# Patient Record
Sex: MALE | Race: WHITE | NOT HISPANIC OR LATINO | Employment: OTHER | URBAN - METROPOLITAN AREA
[De-identification: names, ages, dates, MRNs, and addresses within clinical notes are randomized per-mention and may not be internally consistent; named-entity substitution may affect disease eponyms.]

---

## 2017-01-20 ENCOUNTER — RESULTS ENCOUNTER (OUTPATIENT)
Dept: FAMILY MEDICINE CLINIC | Facility: CLINIC | Age: 77
End: 2017-01-20

## 2017-01-20 DIAGNOSIS — E78.5 HYPERLIPIDEMIA: ICD-10-CM

## 2017-01-20 DIAGNOSIS — E03.9 ACQUIRED HYPOTHYROIDISM: ICD-10-CM

## 2017-01-20 DIAGNOSIS — I10 ESSENTIAL HYPERTENSION: ICD-10-CM

## 2017-01-20 DIAGNOSIS — Z86.73 HISTORY OF CVA (CEREBROVASCULAR ACCIDENT): ICD-10-CM

## 2017-02-15 ENCOUNTER — OFFICE VISIT (OUTPATIENT)
Dept: FAMILY MEDICINE CLINIC | Facility: CLINIC | Age: 77
End: 2017-02-15

## 2017-02-15 VITALS
DIASTOLIC BLOOD PRESSURE: 64 MMHG | HEIGHT: 69 IN | HEART RATE: 60 BPM | OXYGEN SATURATION: 98 % | BODY MASS INDEX: 27.25 KG/M2 | WEIGHT: 184 LBS | SYSTOLIC BLOOD PRESSURE: 116 MMHG | TEMPERATURE: 97.8 F | RESPIRATION RATE: 16 BRPM

## 2017-02-15 DIAGNOSIS — Z86.73 HISTORY OF CVA (CEREBROVASCULAR ACCIDENT): ICD-10-CM

## 2017-02-15 DIAGNOSIS — I10 ESSENTIAL HYPERTENSION: ICD-10-CM

## 2017-02-15 DIAGNOSIS — E78.2 MIXED HYPERLIPIDEMIA: ICD-10-CM

## 2017-02-15 DIAGNOSIS — E03.9 ACQUIRED HYPOTHYROIDISM: ICD-10-CM

## 2017-02-15 PROCEDURE — 99213 OFFICE O/P EST LOW 20 MIN: CPT | Performed by: NURSE PRACTITIONER

## 2017-02-15 RX ORDER — CLOPIDOGREL BISULFATE 75 MG/1
75 TABLET ORAL DAILY
Qty: 90 TABLET | Refills: 1 | Status: SHIPPED | OUTPATIENT
Start: 2017-02-15 | End: 2017-08-30 | Stop reason: SDUPTHER

## 2017-02-15 RX ORDER — HYDROCODONE BITARTRATE AND ACETAMINOPHEN 5; 325 MG/1; MG/1
TABLET ORAL
Refills: 0 | COMMUNITY
Start: 2017-02-06 | End: 2017-04-20

## 2017-02-15 RX ORDER — IRBESARTAN 75 MG/1
75 TABLET ORAL DAILY
Qty: 90 TABLET | Refills: 1 | Status: SHIPPED | OUTPATIENT
Start: 2017-02-15 | End: 2017-03-28 | Stop reason: HOSPADM

## 2017-02-15 RX ORDER — IRBESARTAN 150 MG/1
150 TABLET ORAL DAILY
Qty: 90 TABLET | Refills: 1 | Status: CANCELLED | OUTPATIENT
Start: 2017-02-15 | End: 2017-08-14

## 2017-02-15 RX ORDER — EZETIMIBE 10 MG/1
10 TABLET ORAL NIGHTLY
Qty: 90 TABLET | Refills: 1 | Status: SHIPPED | OUTPATIENT
Start: 2017-02-15 | End: 2017-03-28 | Stop reason: HOSPADM

## 2017-02-15 RX ORDER — LEVOTHYROXINE SODIUM 175 UG/1
175 TABLET ORAL DAILY
Qty: 90 TABLET | Refills: 1 | Status: SHIPPED | OUTPATIENT
Start: 2017-02-15 | End: 2017-08-22 | Stop reason: SDUPTHER

## 2017-02-15 NOTE — PROGRESS NOTES
Subjective   Ismael Ball is a 76 y.o. male.     History of Present Illness   Ismael Ball 76 y.o. male who presents today for routine follow up check and medication refills.  he has a history of   Patient Active Problem List   Diagnosis   • Acquired hypothyroidism   • Essential hypertension   • Hyperlipidemia   • History of CVA (cerebrovascular accident)   • History of prostate cancer   .  Since the last visit, he has overall felt well.  he has been compliant with current meds.  he denies medication side effects.    The following portions of the patient's history were reviewed and updated as appropriate: allergies, current medications, past family history, past medical history, past social history, past surgical history and problem list.    Review of Systems   Constitutional: Negative for fatigue.   Respiratory: Negative for cough and shortness of breath.    Cardiovascular: Negative for chest pain and palpitations.   Skin: Negative for rash.   Psychiatric/Behavioral: Negative for dysphoric mood and sleep disturbance. The patient is not nervous/anxious.        Objective   Physical Exam   Constitutional: He is oriented to person, place, and time. He appears well-developed and well-nourished.   Neck: Carotid bruit is not present.   Cardiovascular: Normal rate and regular rhythm.    Pulmonary/Chest: Effort normal and breath sounds normal.   Neurological: He is oriented to person, place, and time.   Skin: Skin is warm and dry.   Psychiatric: He has a normal mood and affect. His behavior is normal. Judgment and thought content normal.   Nursing note and vitals reviewed.      Assessment/Plan   Ismael was seen today for hyperlipidemia, hypertension and hypothyroidism.    Diagnoses and all orders for this visit:    Acquired hypothyroidism  -     levothyroxine (SYNTHROID, LEVOTHROID) 175 MCG tablet; Take 1 tablet by mouth Daily for 180 days.    Essential hypertension  -     irbesartan (AVAPRO) 75 MG tablet; Take 1 tablet  by mouth Daily.    Mixed hyperlipidemia  -     ezetimibe (ZETIA) 10 MG tablet; Take 1 tablet by mouth Every Night for 180 days.    History of CVA (cerebrovascular accident)  -     clopidogrel (PLAVIX) 75 MG tablet; Take 1 tablet by mouth Daily for 180 days.    Other orders  -     Cancel: irbesartan (AVAPRO) 150 MG tablet; Take 1 tablet by mouth Daily for 180 days.

## 2017-03-02 ENCOUNTER — OFFICE VISIT (OUTPATIENT)
Dept: FAMILY MEDICINE CLINIC | Facility: CLINIC | Age: 77
End: 2017-03-02

## 2017-03-02 VITALS
DIASTOLIC BLOOD PRESSURE: 63 MMHG | TEMPERATURE: 98 F | SYSTOLIC BLOOD PRESSURE: 98 MMHG | WEIGHT: 180 LBS | RESPIRATION RATE: 16 BRPM | BODY MASS INDEX: 26.66 KG/M2 | HEART RATE: 69 BPM | HEIGHT: 69 IN

## 2017-03-02 DIAGNOSIS — R06.02 SHORTNESS OF BREATH: ICD-10-CM

## 2017-03-02 DIAGNOSIS — K83.1 BILIARY STRICTURE: ICD-10-CM

## 2017-03-02 DIAGNOSIS — J40 BRONCHITIS: Primary | ICD-10-CM

## 2017-03-02 LAB
ALBUMIN SERPL-MCNC: 4.1 G/DL (ref 3.5–5.2)
ALBUMIN/GLOB SERPL: 1.8 G/DL
ALP SERPL-CCNC: 136 U/L (ref 39–117)
ALT SERPL-CCNC: 37 U/L (ref 1–41)
AST SERPL-CCNC: 25 U/L (ref 1–40)
BASOPHILS # BLD AUTO: 0.06 10*3/MM3 (ref 0–0.2)
BASOPHILS NFR BLD AUTO: 0.8 % (ref 0–1.5)
BILIRUB SERPL-MCNC: 0.7 MG/DL (ref 0.1–1.2)
BUN SERPL-MCNC: 16 MG/DL (ref 8–23)
BUN/CREAT SERPL: 18.2 (ref 7–25)
CALCIUM SERPL-MCNC: 9.7 MG/DL (ref 8.6–10.5)
CHLORIDE SERPL-SCNC: 102 MMOL/L (ref 98–107)
CHOLEST SERPL-MCNC: 208 MG/DL (ref 0–200)
CO2 SERPL-SCNC: 27.3 MMOL/L (ref 22–29)
CREAT SERPL-MCNC: 0.88 MG/DL (ref 0.76–1.27)
EOSINOPHIL # BLD AUTO: 0.3 10*3/MM3 (ref 0–0.7)
EOSINOPHIL NFR BLD AUTO: 4.2 % (ref 0.3–6.2)
ERYTHROCYTE [DISTWIDTH] IN BLOOD BY AUTOMATED COUNT: 13.9 % (ref 11.5–14.5)
GLOBULIN SER CALC-MCNC: 2.3 GM/DL
GLUCOSE SERPL-MCNC: 105 MG/DL (ref 65–99)
HCT VFR BLD AUTO: 44.4 % (ref 40.4–52.2)
HDLC SERPL-MCNC: 51 MG/DL (ref 40–60)
HGB BLD-MCNC: 14.6 G/DL (ref 13.7–17.6)
IMM GRANULOCYTES # BLD: 0 10*3/MM3 (ref 0–0.03)
IMM GRANULOCYTES NFR BLD: 0 % (ref 0–0.5)
LDLC SERPL CALC-MCNC: 125 MG/DL (ref 0–100)
LDLC/HDLC SERPL: 2.45 {RATIO}
LYMPHOCYTES # BLD AUTO: 3.07 10*3/MM3 (ref 0.9–4.8)
LYMPHOCYTES NFR BLD AUTO: 42.9 % (ref 19.6–45.3)
MCH RBC QN AUTO: 30.2 PG (ref 27–32.7)
MCHC RBC AUTO-ENTMCNC: 32.9 G/DL (ref 32.6–36.4)
MCV RBC AUTO: 91.9 FL (ref 79.8–96.2)
MONOCYTES # BLD AUTO: 0.3 10*3/MM3 (ref 0.2–1.2)
MONOCYTES NFR BLD AUTO: 4.2 % (ref 5–12)
NEUTROPHILS # BLD AUTO: 3.42 10*3/MM3 (ref 1.9–8.1)
NEUTROPHILS NFR BLD AUTO: 47.9 % (ref 42.7–76)
PLATELET # BLD AUTO: 295 10*3/MM3 (ref 140–500)
POTASSIUM SERPL-SCNC: 4.3 MMOL/L (ref 3.5–5.2)
PROT SERPL-MCNC: 6.4 G/DL (ref 6–8.5)
RBC # BLD AUTO: 4.83 10*6/MM3 (ref 4.6–6)
SODIUM SERPL-SCNC: 144 MMOL/L (ref 136–145)
TRIGL SERPL-MCNC: 160 MG/DL (ref 0–150)
TSH SERPL DL<=0.005 MIU/L-ACNC: 0.91 MIU/ML (ref 0.27–4.2)
VLDLC SERPL CALC-MCNC: 32 MG/DL (ref 5–40)
WBC # BLD AUTO: 7.15 10*3/MM3 (ref 4.5–10.7)

## 2017-03-02 PROCEDURE — 99214 OFFICE O/P EST MOD 30 MIN: CPT | Performed by: FAMILY MEDICINE

## 2017-03-02 PROCEDURE — 71020 XR CHEST PA AND LATERAL: CPT | Performed by: FAMILY MEDICINE

## 2017-03-02 NOTE — PROGRESS NOTES
"Chief Complaint   Patient presents with   • URI       Subjective   This patient presents the office to follow-up on recent bronchitis.  He had an ERCP done on February 16 by Dr. Ibarra.  He has a diagnosis of biliary stricture.  Currently he has a stent in his bile duct.  He developed some shortness of breath and was seen in the emergency room on February 22.  After evaluation it was felt he had right lower lobe acute bronchitis.  He was treated with azithromycin which he took starting February 23.  He finished the antibiotics on February 27 and has gradually gotten better.  Today he is asymptomatic with no fever and no cough.  His shortness of breath is much better.  He is here today just to follow up and make sure that the residual changes seen on x-ray have resolved completely. Pt had labs done today    Review of Systems   Respiratory: Positive for shortness of breath. Negative for cough.    Cardiovascular: Negative for chest pain.       Objective   Visit Vitals   • BP 98/63   • Pulse 69   • Temp 98 °F (36.7 °C) (Oral)   • Resp 16   • Ht 69\" (175.3 cm)   • Wt 180 lb (81.6 kg)   • BMI 26.58 kg/m2     Body mass index is 26.58 kg/(m^2).  Physical Exam   Constitutional: No distress.   Pulmonary/Chest: Effort normal and breath sounds normal.   Vitals reviewed.  Views: PA/Lateral    Relevant Clinical Issues/Diagnoses/Indications for XRay: see HPI  Clinical Findings: Chest: elevated right hemidiaphragm    Compared with previous XRay? no    Date of Previous Xray:unknown date    Changes on current Xray? not applicable      Assessment/Plan     Problem List Items Addressed This Visit        Respiratory    RESOLVED: Shortness of breath       Digestive    Biliary stricture      Other Visit Diagnoses     Bronchitis    -  Primary    Relevant Orders    XR Chest PA & Lateral          Outpatient Encounter Prescriptions as of 3/2/2017   Medication Sig Dispense Refill   • clopidogrel (PLAVIX) 75 MG tablet Take 1 tablet by mouth " Daily for 180 days. 90 tablet 1   • ezetimibe (ZETIA) 10 MG tablet Take 1 tablet by mouth Every Night for 180 days. 90 tablet 1   • HYDROcodone-acetaminophen (NORCO) 5-325 MG per tablet TK 1 T PO Q 8 H PRN P  0   • irbesartan (AVAPRO) 75 MG tablet Take 1 tablet by mouth Daily. 90 tablet 1   • levothyroxine (SYNTHROID, LEVOTHROID) 175 MCG tablet Take 1 tablet by mouth Daily for 180 days. 90 tablet 1   • predniSONE (DELTASONE) 5 MG tablet      • ZYTIGA 250 MG chemo tablet        No facility-administered encounter medications on file as of 3/2/2017.        Orders Placed This Encounter   Procedures   • XR Chest PA & Lateral     Order Specific Question:   Reason for Exam:     Answer:   followup RLL bronchitis       Continue with current treatment plan.         RTC as needed or sooner if symptoms worsen or change

## 2017-03-07 NOTE — PROGRESS NOTES
Please call the patient regarding his abnormal result. Set up repeat CXR PA and lateral as suggested. It was rotated. Make sure Sunshine reads the report prior to repeating the film. Have him make an appointment with me after the repeat film has been done. Dr. Olson

## 2017-03-07 NOTE — PROGRESS NOTES
Please accept these satisfactory lab results. Please keep regular follow up appointment as scheduled. Elevated alkaline phos likely due to lung infection. Send low sugar, low cholesterol diet to patient. Keep same followup.                                                     Dr. Olson

## 2017-03-08 ENCOUNTER — TELEPHONE (OUTPATIENT)
Dept: FAMILY MEDICINE CLINIC | Facility: CLINIC | Age: 77
End: 2017-03-08

## 2017-03-08 DIAGNOSIS — R91.1 LUNG NODULE: Primary | ICD-10-CM

## 2017-03-08 DIAGNOSIS — R93.89 ABNORMAL X-RAY: ICD-10-CM

## 2017-03-08 PROCEDURE — 71020 XR CHEST PA AND LATERAL: CPT | Performed by: FAMILY MEDICINE

## 2017-03-08 NOTE — TELEPHONE ENCOUNTER
----- Message from Pierre Olson MD sent at 3/7/2017 10:31 AM EST -----  Please call the patient regarding his abnormal result. Set up repeat CXR PA and lateral as suggested. It was rotated. Make sure Sunshine reads the report prior to repeating the film. Have him make an appointment with me after the repeat film has been done. Dr. Olson

## 2017-03-13 NOTE — TELEPHONE ENCOUNTER
Please call the patient regarding his abnormal result.  Please tell him that this is probably nothing but they did recommend repeat CT scan of the chest.  Please set this up and have him make an appointment to see me after the CT scan of the chest is completed.  This should be CT scan chest with and without contrast.

## 2017-03-14 ENCOUNTER — TELEPHONE (OUTPATIENT)
Dept: FAMILY MEDICINE CLINIC | Facility: CLINIC | Age: 77
End: 2017-03-14

## 2017-03-14 NOTE — TELEPHONE ENCOUNTER
----- Message from Pierre Olson MD sent at 3/12/2017  9:23 PM EDT -----  Please call the patient regarding his abnormal result.  Please tell him that this is probably nothing but they did recommend repeat CT scan of the chest.  Please set this up and have him make an appointment to see me after the CT scan of the chest is completed.  This should be CT scan chest with and without contrast.

## 2017-03-15 NOTE — TELEPHONE ENCOUNTER
Please ask the radiologist to compare the recent ct chest with his cxr and then create addendum to his reading. thanks

## 2017-03-22 ENCOUNTER — APPOINTMENT (OUTPATIENT)
Dept: GENERAL RADIOLOGY | Facility: HOSPITAL | Age: 77
End: 2017-03-22

## 2017-03-22 ENCOUNTER — APPOINTMENT (OUTPATIENT)
Dept: CT IMAGING | Facility: HOSPITAL | Age: 77
End: 2017-03-22

## 2017-03-22 ENCOUNTER — HOSPITAL ENCOUNTER (INPATIENT)
Facility: HOSPITAL | Age: 77
LOS: 6 days | Discharge: HOME | End: 2017-03-28
Attending: FAMILY MEDICINE | Admitting: INTERNAL MEDICINE

## 2017-03-22 DIAGNOSIS — I50.9 ACUTE CONGESTIVE HEART FAILURE, UNSPECIFIED CONGESTIVE HEART FAILURE TYPE: ICD-10-CM

## 2017-03-22 DIAGNOSIS — K83.09 ASCENDING CHOLANGITIS: Primary | ICD-10-CM

## 2017-03-22 DIAGNOSIS — N17.9 ACUTE RENAL FAILURE, UNSPECIFIED ACUTE RENAL FAILURE TYPE (HCC): ICD-10-CM

## 2017-03-22 DIAGNOSIS — A41.9 SEVERE SEPSIS WITH SEPTIC SHOCK (HCC): ICD-10-CM

## 2017-03-22 DIAGNOSIS — R65.21 SEVERE SEPSIS WITH SEPTIC SHOCK (HCC): ICD-10-CM

## 2017-03-22 LAB
ALBUMIN SERPL-MCNC: 2.9 G/DL (ref 3.5–5.2)
ALBUMIN/GLOB SERPL: 1.3 G/DL
ALP SERPL-CCNC: 255 U/L (ref 39–117)
ALT SERPL W P-5'-P-CCNC: 332 U/L (ref 1–41)
ANION GAP SERPL CALCULATED.3IONS-SCNC: 23.6 MMOL/L
AST SERPL-CCNC: 282 U/L (ref 1–40)
BILIRUB SERPL-MCNC: 6.4 MG/DL (ref 0.1–1.2)
BUN BLD-MCNC: 30 MG/DL (ref 8–23)
BUN/CREAT SERPL: 9.3 (ref 7–25)
CALCIUM SPEC-SCNC: 8.5 MG/DL (ref 8.6–10.5)
CHLORIDE SERPL-SCNC: 101 MMOL/L (ref 98–107)
CO2 SERPL-SCNC: 18.4 MMOL/L (ref 22–29)
CREAT BLD-MCNC: 3.22 MG/DL (ref 0.76–1.27)
D-LACTATE SERPL-SCNC: 3 MMOL/L (ref 0.5–2)
D-LACTATE SERPL-SCNC: 6.4 MMOL/L (ref 0.5–2)
DEPRECATED RDW RBC AUTO: 50.3 FL (ref 37–54)
DOHLE BODIES: PRESENT
EOSINOPHIL # BLD MANUAL: 0.1 10*3/MM3 (ref 0–0.7)
EOSINOPHIL NFR BLD MANUAL: 1 % (ref 0.3–6.2)
ERYTHROCYTE [DISTWIDTH] IN BLOOD BY AUTOMATED COUNT: 14.8 % (ref 11.5–14.5)
FLUAV AG NPH QL: NEGATIVE
FLUBV AG NPH QL IA: NEGATIVE
GFR SERPL CREATININE-BSD FRML MDRD: 19 ML/MIN/1.73
GLOBULIN UR ELPH-MCNC: 2.2 GM/DL
GLUCOSE BLD-MCNC: 121 MG/DL (ref 65–99)
GLUCOSE BLDC GLUCOMTR-MCNC: 65 MG/DL (ref 70–130)
HCT VFR BLD AUTO: 38.3 % (ref 40.4–52.2)
HGB BLD-MCNC: 12.3 G/DL (ref 13.7–17.6)
HOLD SPECIMEN: NORMAL
HOLD SPECIMEN: NORMAL
LYMPHOCYTES # BLD MANUAL: 2.3 10*3/MM3 (ref 0.9–4.8)
LYMPHOCYTES NFR BLD MANUAL: 24 % (ref 19.6–45.3)
MCH RBC QN AUTO: 29.8 PG (ref 27–32.7)
MCHC RBC AUTO-ENTMCNC: 32.1 G/DL (ref 32.6–36.4)
MCV RBC AUTO: 92.7 FL (ref 79.8–96.2)
METAMYELOCYTES NFR BLD MANUAL: 2 % (ref 0–0)
NEUTROPHILS # BLD AUTO: 6.99 10*3/MM3 (ref 1.9–8.1)
NEUTROPHILS NFR BLD MANUAL: 73 % (ref 42.7–76)
NEUTS BAND NFR BLD MANUAL: 0 % (ref 0–5)
NEUTS VAC BLD QL SMEAR: ABNORMAL
NT-PROBNP SERPL-MCNC: 8969 PG/ML (ref 0–1800)
PLAT MORPH BLD: NORMAL
PLATELET # BLD AUTO: 149 10*3/MM3 (ref 140–500)
PMV BLD AUTO: 11.1 FL (ref 6–12)
POTASSIUM BLD-SCNC: 3.5 MMOL/L (ref 3.5–5.2)
PROT SERPL-MCNC: 5.1 G/DL (ref 6–8.5)
RBC # BLD AUTO: 4.13 10*6/MM3 (ref 4.6–6)
RBC MORPH BLD: NORMAL
SCAN SLIDE: NORMAL
SODIUM BLD-SCNC: 143 MMOL/L (ref 136–145)
TROPONIN T SERPL-MCNC: 0.03 NG/ML (ref 0–0.03)
WBC NRBC COR # BLD: 9.57 10*3/MM3 (ref 4.5–10.7)
WHOLE BLOOD HOLD SPECIMEN: NORMAL
WHOLE BLOOD HOLD SPECIMEN: NORMAL

## 2017-03-22 PROCEDURE — 80053 COMPREHEN METABOLIC PANEL: CPT | Performed by: FAMILY MEDICINE

## 2017-03-22 PROCEDURE — 99285 EMERGENCY DEPT VISIT HI MDM: CPT

## 2017-03-22 PROCEDURE — 25010000002 CEFEPIME: Performed by: FAMILY MEDICINE

## 2017-03-22 PROCEDURE — 87804 INFLUENZA ASSAY W/OPTIC: CPT | Performed by: PHYSICIAN ASSISTANT

## 2017-03-22 PROCEDURE — 93005 ELECTROCARDIOGRAM TRACING: CPT | Performed by: FAMILY MEDICINE

## 2017-03-22 PROCEDURE — 71020 HC CHEST PA AND LATERAL: CPT

## 2017-03-22 PROCEDURE — 87040 BLOOD CULTURE FOR BACTERIA: CPT | Performed by: FAMILY MEDICINE

## 2017-03-22 PROCEDURE — 85025 COMPLETE CBC W/AUTO DIFF WBC: CPT | Performed by: FAMILY MEDICINE

## 2017-03-22 PROCEDURE — 82962 GLUCOSE BLOOD TEST: CPT

## 2017-03-22 PROCEDURE — 25010000002 METHYLPREDNISOLONE PER 125 MG: Performed by: FAMILY MEDICINE

## 2017-03-22 PROCEDURE — 87186 SC STD MICRODIL/AGAR DIL: CPT | Performed by: FAMILY MEDICINE

## 2017-03-22 PROCEDURE — 84484 ASSAY OF TROPONIN QUANT: CPT | Performed by: FAMILY MEDICINE

## 2017-03-22 PROCEDURE — 25010000002 VANCOMYCIN: Performed by: FAMILY MEDICINE

## 2017-03-22 PROCEDURE — 74176 CT ABD & PELVIS W/O CONTRAST: CPT

## 2017-03-22 PROCEDURE — 85007 BL SMEAR W/DIFF WBC COUNT: CPT | Performed by: FAMILY MEDICINE

## 2017-03-22 PROCEDURE — 93010 ELECTROCARDIOGRAM REPORT: CPT | Performed by: INTERNAL MEDICINE

## 2017-03-22 PROCEDURE — 87150 DNA/RNA AMPLIFIED PROBE: CPT | Performed by: FAMILY MEDICINE

## 2017-03-22 PROCEDURE — 83880 ASSAY OF NATRIURETIC PEPTIDE: CPT | Performed by: FAMILY MEDICINE

## 2017-03-22 PROCEDURE — 83605 ASSAY OF LACTIC ACID: CPT | Performed by: FAMILY MEDICINE

## 2017-03-22 RX ORDER — HYDROCODONE BITARTRATE AND ACETAMINOPHEN 5; 325 MG/1; MG/1
2 TABLET ORAL EVERY 6 HOURS PRN
Status: DISCONTINUED | OUTPATIENT
Start: 2017-03-22 | End: 2017-03-28 | Stop reason: HOSPADM

## 2017-03-22 RX ORDER — ONDANSETRON 4 MG/1
4 TABLET, ORALLY DISINTEGRATING ORAL EVERY 6 HOURS PRN
Status: DISCONTINUED | OUTPATIENT
Start: 2017-03-22 | End: 2017-03-28 | Stop reason: HOSPADM

## 2017-03-22 RX ORDER — PREDNISONE 1 MG/1
5 TABLET ORAL 2 TIMES DAILY WITH MEALS
Status: DISCONTINUED | OUTPATIENT
Start: 2017-03-22 | End: 2017-03-23

## 2017-03-22 RX ORDER — PANTOPRAZOLE SODIUM 40 MG/10ML
40 INJECTION, POWDER, LYOPHILIZED, FOR SOLUTION INTRAVENOUS
Status: DISCONTINUED | OUTPATIENT
Start: 2017-03-23 | End: 2017-03-25

## 2017-03-22 RX ORDER — HYDROCODONE BITARTRATE AND ACETAMINOPHEN 10; 325 MG/1; MG/1
1 TABLET ORAL EVERY 6 HOURS PRN
COMMUNITY
End: 2017-03-28 | Stop reason: HOSPADM

## 2017-03-22 RX ORDER — METHYLPREDNISOLONE SODIUM SUCCINATE 125 MG/2ML
125 INJECTION, POWDER, LYOPHILIZED, FOR SOLUTION INTRAMUSCULAR; INTRAVENOUS ONCE
Status: COMPLETED | OUTPATIENT
Start: 2017-03-22 | End: 2017-03-22

## 2017-03-22 RX ORDER — SODIUM CHLORIDE 0.9 % (FLUSH) 0.9 %
10 SYRINGE (ML) INJECTION AS NEEDED
Status: DISCONTINUED | OUTPATIENT
Start: 2017-03-22 | End: 2017-03-27

## 2017-03-22 RX ORDER — SODIUM CHLORIDE 0.9 % (FLUSH) 0.9 %
1-10 SYRINGE (ML) INJECTION AS NEEDED
Status: DISCONTINUED | OUTPATIENT
Start: 2017-03-22 | End: 2017-03-28 | Stop reason: HOSPADM

## 2017-03-22 RX ORDER — ONDANSETRON 4 MG/1
4 TABLET, FILM COATED ORAL EVERY 6 HOURS PRN
Status: DISCONTINUED | OUTPATIENT
Start: 2017-03-22 | End: 2017-03-28 | Stop reason: HOSPADM

## 2017-03-22 RX ORDER — ONDANSETRON 2 MG/ML
4 INJECTION INTRAMUSCULAR; INTRAVENOUS EVERY 6 HOURS PRN
Status: DISCONTINUED | OUTPATIENT
Start: 2017-03-22 | End: 2017-03-28 | Stop reason: HOSPADM

## 2017-03-22 RX ADMIN — SODIUM CHLORIDE 2448 ML: 9 INJECTION, SOLUTION INTRAVENOUS at 17:00

## 2017-03-22 RX ADMIN — METHYLPREDNISOLONE SODIUM SUCCINATE 125 MG: 125 INJECTION, POWDER, FOR SOLUTION INTRAMUSCULAR; INTRAVENOUS at 20:35

## 2017-03-22 RX ADMIN — SODIUM CHLORIDE 1000 ML: 9 INJECTION, SOLUTION INTRAVENOUS at 17:37

## 2017-03-22 RX ADMIN — SODIUM BICARBONATE 125 ML/HR: 84 INJECTION, SOLUTION INTRAVENOUS at 23:46

## 2017-03-22 RX ADMIN — CEFEPIME 2 G: 2 INJECTION, POWDER, FOR SOLUTION INTRAVENOUS at 20:26

## 2017-03-22 RX ADMIN — Medication 0.02 MCG/KG/MIN: at 19:31

## 2017-03-22 RX ADMIN — VANCOMYCIN HYDROCHLORIDE 1750 MG: 1 INJECTION, POWDER, LYOPHILIZED, FOR SOLUTION INTRAVENOUS at 18:35

## 2017-03-22 RX ADMIN — METRONIDAZOLE 500 MG: 500 INJECTION, SOLUTION INTRAVENOUS at 19:02

## 2017-03-23 LAB
ALBUMIN SERPL-MCNC: 2.8 G/DL (ref 3.5–5.2)
ALBUMIN/GLOB SERPL: 1.1 G/DL
ALP SERPL-CCNC: 223 U/L (ref 39–117)
ALT SERPL W P-5'-P-CCNC: 303 U/L (ref 1–41)
AMYLASE SERPL-CCNC: 31 U/L (ref 28–100)
ANION GAP SERPL CALCULATED.3IONS-SCNC: 21.3 MMOL/L
AST SERPL-CCNC: 218 U/L (ref 1–40)
BACTERIA BLD CULT: ABNORMAL
BACTERIA UR QL AUTO: ABNORMAL /HPF
BILIRUB SERPL-MCNC: 6.6 MG/DL (ref 0.1–1.2)
BILIRUB UR QL STRIP: NEGATIVE
BUN BLD-MCNC: 33 MG/DL (ref 8–23)
BUN/CREAT SERPL: 12.3 (ref 7–25)
CALCIUM SPEC-SCNC: 7.8 MG/DL (ref 8.6–10.5)
CHLORIDE SERPL-SCNC: 103 MMOL/L (ref 98–107)
CK SERPL-CCNC: 287 U/L (ref 20–200)
CLARITY UR: ABNORMAL
CO2 SERPL-SCNC: 17.7 MMOL/L (ref 22–29)
COLOR UR: ABNORMAL
CREAT BLD-MCNC: 2.69 MG/DL (ref 0.76–1.27)
D-LACTATE SERPL-SCNC: 3.4 MMOL/L (ref 0.5–2)
D-LACTATE SERPL-SCNC: 3.5 MMOL/L (ref 0.5–2)
DEPRECATED RDW RBC AUTO: 51.7 FL (ref 37–54)
ERYTHROCYTE [DISTWIDTH] IN BLOOD BY AUTOMATED COUNT: 15.1 % (ref 11.5–14.5)
GFR SERPL CREATININE-BSD FRML MDRD: 23 ML/MIN/1.73
GLOBULIN UR ELPH-MCNC: 2.6 GM/DL
GLUCOSE BLD-MCNC: 123 MG/DL (ref 65–99)
GLUCOSE BLDC GLUCOMTR-MCNC: 133 MG/DL (ref 70–130)
GLUCOSE BLDC GLUCOMTR-MCNC: 145 MG/DL (ref 70–130)
GLUCOSE BLDC GLUCOMTR-MCNC: 163 MG/DL (ref 70–130)
GLUCOSE BLDC GLUCOMTR-MCNC: 175 MG/DL (ref 70–130)
GLUCOSE BLDC GLUCOMTR-MCNC: 60 MG/DL (ref 70–130)
GLUCOSE UR STRIP-MCNC: NEGATIVE MG/DL
HCT VFR BLD AUTO: 39 % (ref 40.4–52.2)
HGB BLD-MCNC: 12.7 G/DL (ref 13.7–17.6)
HGB UR QL STRIP.AUTO: ABNORMAL
HYALINE CASTS UR QL AUTO: ABNORMAL /LPF
INR PPP: 1.42 (ref 0.9–1.1)
KETONES UR QL STRIP: NEGATIVE
LEUKOCYTE ESTERASE UR QL STRIP.AUTO: ABNORMAL
LIPASE SERPL-CCNC: 16 U/L (ref 13–60)
LYMPHOCYTES # BLD MANUAL: 0.46 10*3/MM3 (ref 0.9–4.8)
LYMPHOCYTES NFR BLD MANUAL: 1 % (ref 5–12)
LYMPHOCYTES NFR BLD MANUAL: 3 % (ref 19.6–45.3)
MAGNESIUM SERPL-MCNC: 1.4 MG/DL (ref 1.6–2.4)
MCH RBC QN AUTO: 30.8 PG (ref 27–32.7)
MCHC RBC AUTO-ENTMCNC: 32.6 G/DL (ref 32.6–36.4)
MCV RBC AUTO: 94.4 FL (ref 79.8–96.2)
METAMYELOCYTES NFR BLD MANUAL: 8 % (ref 0–0)
MONOCYTES # BLD AUTO: 0.15 10*3/MM3 (ref 0.2–1.2)
NEUTROPHILS # BLD AUTO: 13.53 10*3/MM3 (ref 1.9–8.1)
NEUTROPHILS NFR BLD MANUAL: 88 % (ref 42.7–76)
NEUTS BAND NFR BLD MANUAL: 0 % (ref 0–5)
NITRITE UR QL STRIP: NEGATIVE
NT-PROBNP SERPL-MCNC: ABNORMAL PG/ML (ref 0–1800)
PH UR STRIP.AUTO: 5.5 [PH] (ref 5–8)
PHOSPHATE SERPL-MCNC: 6.4 MG/DL (ref 2.5–4.5)
PLAT MORPH BLD: NORMAL
PLATELET # BLD AUTO: 149 10*3/MM3 (ref 140–500)
PMV BLD AUTO: 11.4 FL (ref 6–12)
POTASSIUM BLD-SCNC: 4.4 MMOL/L (ref 3.5–5.2)
PROCALCITONIN SERPL-MCNC: 43.84 NG/ML (ref 0.1–0.25)
PROT SERPL-MCNC: 5.4 G/DL (ref 6–8.5)
PROT UR QL STRIP: ABNORMAL
PROTHROMBIN TIME: 16.8 SECONDS (ref 11.7–14.2)
RBC # BLD AUTO: 4.13 10*6/MM3 (ref 4.6–6)
RBC # UR: ABNORMAL /HPF
RBC MORPH BLD: NORMAL
REF LAB TEST METHOD: ABNORMAL
SCAN SLIDE: NORMAL
SODIUM BLD-SCNC: 142 MMOL/L (ref 136–145)
SP GR UR STRIP: 1.02 (ref 1–1.03)
SQUAMOUS #/AREA URNS HPF: ABNORMAL /HPF
UROBILINOGEN UR QL STRIP: ABNORMAL
VANCOMYCIN SERPL-MCNC: 13.5 MCG/ML (ref 5–40)
WBC MORPH BLD: NORMAL
WBC NRBC COR # BLD: 15.38 10*3/MM3 (ref 4.5–10.7)
WBC UR QL AUTO: ABNORMAL /HPF

## 2017-03-23 PROCEDURE — 87081 CULTURE SCREEN ONLY: CPT | Performed by: INTERNAL MEDICINE

## 2017-03-23 PROCEDURE — 83880 ASSAY OF NATRIURETIC PEPTIDE: CPT | Performed by: INTERNAL MEDICINE

## 2017-03-23 PROCEDURE — 84100 ASSAY OF PHOSPHORUS: CPT | Performed by: INTERNAL MEDICINE

## 2017-03-23 PROCEDURE — 80053 COMPREHEN METABOLIC PANEL: CPT | Performed by: INTERNAL MEDICINE

## 2017-03-23 PROCEDURE — 82150 ASSAY OF AMYLASE: CPT | Performed by: INTERNAL MEDICINE

## 2017-03-23 PROCEDURE — 85610 PROTHROMBIN TIME: CPT | Performed by: INTERNAL MEDICINE

## 2017-03-23 PROCEDURE — 25010000002 CALCIUM GLUCONATE PER 10 ML: Performed by: INTERNAL MEDICINE

## 2017-03-23 PROCEDURE — 85007 BL SMEAR W/DIFF WBC COUNT: CPT | Performed by: INTERNAL MEDICINE

## 2017-03-23 PROCEDURE — 25010000002 ONDANSETRON PER 1 MG: Performed by: INTERNAL MEDICINE

## 2017-03-23 PROCEDURE — 80202 ASSAY OF VANCOMYCIN: CPT | Performed by: INTERNAL MEDICINE

## 2017-03-23 PROCEDURE — 25010000002 HYDROCORTISONE SODIUM SUCCINATE 100 MG RECONSTITUTED SOLUTION: Performed by: INTERNAL MEDICINE

## 2017-03-23 PROCEDURE — 25010000002 MAGNESIUM SULFATE IN D5W 1G/100ML (PREMIX) 10-5 MG/ML-% SOLUTION: Performed by: INTERNAL MEDICINE

## 2017-03-23 PROCEDURE — 94799 UNLISTED PULMONARY SVC/PX: CPT

## 2017-03-23 PROCEDURE — 81001 URINALYSIS AUTO W/SCOPE: CPT | Performed by: FAMILY MEDICINE

## 2017-03-23 PROCEDURE — 63710000001 PREDNISONE PER 5 MG: Performed by: INTERNAL MEDICINE

## 2017-03-23 PROCEDURE — 84145 PROCALCITONIN (PCT): CPT | Performed by: INTERNAL MEDICINE

## 2017-03-23 PROCEDURE — 25010000003 CEFTRIAXONE PER 250 MG: Performed by: INTERNAL MEDICINE

## 2017-03-23 PROCEDURE — 83605 ASSAY OF LACTIC ACID: CPT | Performed by: INTERNAL MEDICINE

## 2017-03-23 PROCEDURE — 63710000001 DIPHENHYDRAMINE PER 50 MG: Performed by: INTERNAL MEDICINE

## 2017-03-23 PROCEDURE — 83735 ASSAY OF MAGNESIUM: CPT | Performed by: INTERNAL MEDICINE

## 2017-03-23 PROCEDURE — 85025 COMPLETE CBC W/AUTO DIFF WBC: CPT | Performed by: INTERNAL MEDICINE

## 2017-03-23 PROCEDURE — 82962 GLUCOSE BLOOD TEST: CPT

## 2017-03-23 PROCEDURE — 87086 URINE CULTURE/COLONY COUNT: CPT | Performed by: FAMILY MEDICINE

## 2017-03-23 PROCEDURE — 99223 1ST HOSP IP/OBS HIGH 75: CPT | Performed by: INTERNAL MEDICINE

## 2017-03-23 PROCEDURE — 83690 ASSAY OF LIPASE: CPT | Performed by: INTERNAL MEDICINE

## 2017-03-23 PROCEDURE — 82550 ASSAY OF CK (CPK): CPT | Performed by: INTERNAL MEDICINE

## 2017-03-23 RX ORDER — PREDNISONE 1 MG/1
5 TABLET ORAL
Status: DISCONTINUED | OUTPATIENT
Start: 2017-03-24 | End: 2017-03-25

## 2017-03-23 RX ORDER — DIPHENHYDRAMINE HCL 25 MG
25 CAPSULE ORAL NIGHTLY PRN
Status: DISCONTINUED | OUTPATIENT
Start: 2017-03-23 | End: 2017-03-28 | Stop reason: HOSPADM

## 2017-03-23 RX ORDER — CEFTRIAXONE SODIUM 2 G/50ML
2 INJECTION, SOLUTION INTRAVENOUS EVERY 24 HOURS
Status: DISCONTINUED | OUTPATIENT
Start: 2017-03-23 | End: 2017-03-28 | Stop reason: HOSPADM

## 2017-03-23 RX ADMIN — CALCIUM GLUCONATE 1 G: 94 INJECTION, SOLUTION INTRAVENOUS at 20:53

## 2017-03-23 RX ADMIN — CEFTRIAXONE SODIUM 2 G: 2 INJECTION, SOLUTION INTRAVENOUS at 20:35

## 2017-03-23 RX ADMIN — METRONIDAZOLE 500 MG: 500 INJECTION, SOLUTION INTRAVENOUS at 11:10

## 2017-03-23 RX ADMIN — ONDANSETRON 4 MG: 2 INJECTION INTRAMUSCULAR; INTRAVENOUS at 15:03

## 2017-03-23 RX ADMIN — HYDROCORTISONE SODIUM SUCCINATE 100 MG: 100 INJECTION, POWDER, FOR SOLUTION INTRAMUSCULAR; INTRAVENOUS at 20:27

## 2017-03-23 RX ADMIN — HYDROCORTISONE SODIUM SUCCINATE 100 MG: 100 INJECTION, POWDER, FOR SOLUTION INTRAMUSCULAR; INTRAVENOUS at 13:47

## 2017-03-23 RX ADMIN — ONDANSETRON 4 MG: 2 INJECTION INTRAMUSCULAR; INTRAVENOUS at 07:46

## 2017-03-23 RX ADMIN — PANTOPRAZOLE SODIUM 40 MG: 40 INJECTION, POWDER, FOR SOLUTION INTRAVENOUS at 06:53

## 2017-03-23 RX ADMIN — CEFEPIME HYDROCHLORIDE 1 G: 1 INJECTION, POWDER, FOR SOLUTION INTRAMUSCULAR; INTRAVENOUS at 10:12

## 2017-03-23 RX ADMIN — DIPHENHYDRAMINE HYDROCHLORIDE 25 MG: 25 CAPSULE ORAL at 20:35

## 2017-03-23 RX ADMIN — SODIUM BICARBONATE 125 ML/HR: 84 INJECTION, SOLUTION INTRAVENOUS at 07:53

## 2017-03-23 RX ADMIN — MAGNESIUM SULFATE HEPTAHYDRATE 3 G: 1 INJECTION, SOLUTION INTRAVENOUS at 07:59

## 2017-03-23 RX ADMIN — PREDNISONE 5 MG: 5 TABLET ORAL at 00:47

## 2017-03-23 RX ADMIN — METRONIDAZOLE 500 MG: 500 INJECTION, SOLUTION INTRAVENOUS at 02:00

## 2017-03-24 ENCOUNTER — APPOINTMENT (OUTPATIENT)
Dept: GENERAL RADIOLOGY | Facility: HOSPITAL | Age: 77
End: 2017-03-24

## 2017-03-24 ENCOUNTER — ANESTHESIA EVENT (OUTPATIENT)
Dept: PERIOP | Facility: HOSPITAL | Age: 77
End: 2017-03-24

## 2017-03-24 ENCOUNTER — ANESTHESIA (OUTPATIENT)
Dept: PERIOP | Facility: HOSPITAL | Age: 77
End: 2017-03-24

## 2017-03-24 LAB
ALBUMIN SERPL-MCNC: 2.6 G/DL (ref 3.5–5.2)
ALBUMIN/GLOB SERPL: 1 G/DL
ALP SERPL-CCNC: 184 U/L (ref 39–117)
ALT SERPL W P-5'-P-CCNC: 205 U/L (ref 1–41)
ANION GAP SERPL CALCULATED.3IONS-SCNC: 20.3 MMOL/L
AST SERPL-CCNC: 113 U/L (ref 1–40)
BACTERIA SPEC AEROBE CULT: NO GROWTH
BILIRUB SERPL-MCNC: 4 MG/DL (ref 0.1–1.2)
BUN BLD-MCNC: 50 MG/DL (ref 8–23)
BUN/CREAT SERPL: 20.3 (ref 7–25)
CA-I BLD-MCNC: 4 MG/DL (ref 4.6–5.4)
CA-I SERPL ISE-MCNC: 1.01 MMOL/L (ref 1.1–1.35)
CALCIUM SPEC-SCNC: 7.3 MG/DL (ref 8.6–10.5)
CHLORIDE SERPL-SCNC: 103 MMOL/L (ref 98–107)
CO2 SERPL-SCNC: 18.7 MMOL/L (ref 22–29)
CREAT BLD-MCNC: 2.46 MG/DL (ref 0.76–1.27)
DEPRECATED RDW RBC AUTO: 49.4 FL (ref 37–54)
ERYTHROCYTE [DISTWIDTH] IN BLOOD BY AUTOMATED COUNT: 14.9 % (ref 11.5–14.5)
GFR SERPL CREATININE-BSD FRML MDRD: 26 ML/MIN/1.73
GLOBULIN UR ELPH-MCNC: 2.5 GM/DL
GLUCOSE BLD-MCNC: 147 MG/DL (ref 65–99)
HCT VFR BLD AUTO: 34.4 % (ref 40.4–52.2)
HGB BLD-MCNC: 11.7 G/DL (ref 13.7–17.6)
MAGNESIUM SERPL-MCNC: 2.3 MG/DL (ref 1.6–2.4)
MCH RBC QN AUTO: 31 PG (ref 27–32.7)
MCHC RBC AUTO-ENTMCNC: 34 G/DL (ref 32.6–36.4)
MCV RBC AUTO: 91 FL (ref 79.8–96.2)
PHOSPHATE SERPL-MCNC: 5.2 MG/DL (ref 2.5–4.5)
PLATELET # BLD AUTO: 89 10*3/MM3 (ref 140–500)
PMV BLD AUTO: 11.6 FL (ref 6–12)
POTASSIUM BLD-SCNC: 3.5 MMOL/L (ref 3.5–5.2)
PROT SERPL-MCNC: 5.1 G/DL (ref 6–8.5)
RBC # BLD AUTO: 3.78 10*6/MM3 (ref 4.6–6)
SODIUM BLD-SCNC: 142 MMOL/L (ref 136–145)
WBC NRBC COR # BLD: 9.66 10*3/MM3 (ref 4.5–10.7)

## 2017-03-24 PROCEDURE — 85027 COMPLETE CBC AUTOMATED: CPT | Performed by: INTERNAL MEDICINE

## 2017-03-24 PROCEDURE — 25010000002 MIDAZOLAM PER 1 MG: Performed by: ANESTHESIOLOGY

## 2017-03-24 PROCEDURE — 25010000002 CALCIUM GLUCONATE PER 10 ML: Performed by: INTERNAL MEDICINE

## 2017-03-24 PROCEDURE — 74000 HC ABDOMEN KUB: CPT

## 2017-03-24 PROCEDURE — 80053 COMPREHEN METABOLIC PANEL: CPT | Performed by: INTERNAL MEDICINE

## 2017-03-24 PROCEDURE — 84100 ASSAY OF PHOSPHORUS: CPT | Performed by: INTERNAL MEDICINE

## 2017-03-24 PROCEDURE — 25010000002 HYDROCORTISONE SODIUM SUCCINATE 100 MG RECONSTITUTED SOLUTION: Performed by: INTERNAL MEDICINE

## 2017-03-24 PROCEDURE — 0F798DZ DILATION OF COMMON BILE DUCT WITH INTRALUMINAL DEVICE, VIA NATURAL OR ARTIFICIAL OPENING ENDOSCOPIC: ICD-10-PCS | Performed by: INTERNAL MEDICINE

## 2017-03-24 PROCEDURE — 25010000002 PROPOFOL 10 MG/ML EMULSION: Performed by: NURSE ANESTHETIST, CERTIFIED REGISTERED

## 2017-03-24 PROCEDURE — 74328 X-RAY BILE DUCT ENDOSCOPY: CPT

## 2017-03-24 PROCEDURE — C1874 STENT, COATED/COV W/DEL SYS: HCPCS | Performed by: INTERNAL MEDICINE

## 2017-03-24 PROCEDURE — 83735 ASSAY OF MAGNESIUM: CPT | Performed by: INTERNAL MEDICINE

## 2017-03-24 PROCEDURE — 25010000003 CEFTRIAXONE PER 250 MG: Performed by: INTERNAL MEDICINE

## 2017-03-24 PROCEDURE — C1769 GUIDE WIRE: HCPCS | Performed by: INTERNAL MEDICINE

## 2017-03-24 PROCEDURE — 0 IOPAMIDOL 61 % SOLUTION: Performed by: INTERNAL MEDICINE

## 2017-03-24 PROCEDURE — 25010000002 ONDANSETRON PER 1 MG: Performed by: INTERNAL MEDICINE

## 2017-03-24 PROCEDURE — 43276 ERCP STENT EXCHANGE W/DILATE: CPT | Performed by: INTERNAL MEDICINE

## 2017-03-24 PROCEDURE — 0FPB8DZ REMOVAL OF INTRALUMINAL DEVICE FROM HEPATOBILIARY DUCT, VIA NATURAL OR ARTIFICIAL OPENING ENDOSCOPIC: ICD-10-PCS | Performed by: INTERNAL MEDICINE

## 2017-03-24 PROCEDURE — 25010000002 FENTANYL CITRATE (PF) 100 MCG/2ML SOLUTION: Performed by: INTERNAL MEDICINE

## 2017-03-24 PROCEDURE — 82330 ASSAY OF CALCIUM: CPT | Performed by: INTERNAL MEDICINE

## 2017-03-24 PROCEDURE — 25010000002 MIDAZOLAM PER 1 MG: Performed by: NURSE ANESTHETIST, CERTIFIED REGISTERED

## 2017-03-24 DEVICE — WALLFLEX BILIARY RX COVERED 10X60
Type: IMPLANTABLE DEVICE | Status: FUNCTIONAL
Brand: WALLFLEX™ BILIARY

## 2017-03-24 RX ORDER — MIDAZOLAM HYDROCHLORIDE 1 MG/ML
2 INJECTION INTRAMUSCULAR; INTRAVENOUS
Status: DISCONTINUED | OUTPATIENT
Start: 2017-03-24 | End: 2017-03-24 | Stop reason: HOSPADM

## 2017-03-24 RX ORDER — DIPHENHYDRAMINE HYDROCHLORIDE 50 MG/ML
12.5 INJECTION INTRAMUSCULAR; INTRAVENOUS
Status: DISCONTINUED | OUTPATIENT
Start: 2017-03-24 | End: 2017-03-24 | Stop reason: HOSPADM

## 2017-03-24 RX ORDER — LIDOCAINE HYDROCHLORIDE 20 MG/ML
INJECTION, SOLUTION INFILTRATION; PERINEURAL AS NEEDED
Status: DISCONTINUED | OUTPATIENT
Start: 2017-03-24 | End: 2017-03-24 | Stop reason: SURG

## 2017-03-24 RX ORDER — PROMETHAZINE HYDROCHLORIDE 25 MG/ML
12.5 INJECTION, SOLUTION INTRAMUSCULAR; INTRAVENOUS ONCE AS NEEDED
Status: DISCONTINUED | OUTPATIENT
Start: 2017-03-24 | End: 2017-03-24 | Stop reason: HOSPADM

## 2017-03-24 RX ORDER — FENTANYL CITRATE 50 UG/ML
50 INJECTION, SOLUTION INTRAMUSCULAR; INTRAVENOUS
Status: DISCONTINUED | OUTPATIENT
Start: 2017-03-24 | End: 2017-03-24 | Stop reason: HOSPADM

## 2017-03-24 RX ORDER — FAMOTIDINE 10 MG/ML
20 INJECTION, SOLUTION INTRAVENOUS ONCE
Status: COMPLETED | OUTPATIENT
Start: 2017-03-24 | End: 2017-03-24

## 2017-03-24 RX ORDER — ONDANSETRON 2 MG/ML
4 INJECTION INTRAMUSCULAR; INTRAVENOUS ONCE AS NEEDED
Status: DISCONTINUED | OUTPATIENT
Start: 2017-03-24 | End: 2017-03-24 | Stop reason: HOSPADM

## 2017-03-24 RX ORDER — SODIUM CHLORIDE, SODIUM LACTATE, POTASSIUM CHLORIDE, CALCIUM CHLORIDE 600; 310; 30; 20 MG/100ML; MG/100ML; MG/100ML; MG/100ML
9 INJECTION, SOLUTION INTRAVENOUS CONTINUOUS
Status: DISCONTINUED | OUTPATIENT
Start: 2017-03-24 | End: 2017-03-24

## 2017-03-24 RX ORDER — NALOXONE HCL 0.4 MG/ML
0.2 VIAL (ML) INJECTION AS NEEDED
Status: DISCONTINUED | OUTPATIENT
Start: 2017-03-24 | End: 2017-03-24 | Stop reason: HOSPADM

## 2017-03-24 RX ORDER — LABETALOL HYDROCHLORIDE 5 MG/ML
5 INJECTION, SOLUTION INTRAVENOUS
Status: DISCONTINUED | OUTPATIENT
Start: 2017-03-24 | End: 2017-03-24 | Stop reason: HOSPADM

## 2017-03-24 RX ORDER — HYDRALAZINE HYDROCHLORIDE 20 MG/ML
5 INJECTION INTRAMUSCULAR; INTRAVENOUS
Status: DISCONTINUED | OUTPATIENT
Start: 2017-03-24 | End: 2017-03-24 | Stop reason: HOSPADM

## 2017-03-24 RX ORDER — PROMETHAZINE HYDROCHLORIDE 25 MG/1
25 TABLET ORAL ONCE AS NEEDED
Status: DISCONTINUED | OUTPATIENT
Start: 2017-03-24 | End: 2017-03-24 | Stop reason: HOSPADM

## 2017-03-24 RX ORDER — PROMETHAZINE HYDROCHLORIDE 25 MG/1
12.5 TABLET ORAL ONCE AS NEEDED
Status: DISCONTINUED | OUTPATIENT
Start: 2017-03-24 | End: 2017-03-24 | Stop reason: HOSPADM

## 2017-03-24 RX ORDER — FENTANYL CITRATE 50 UG/ML
25 INJECTION, SOLUTION INTRAMUSCULAR; INTRAVENOUS
Status: DISCONTINUED | OUTPATIENT
Start: 2017-03-24 | End: 2017-03-25

## 2017-03-24 RX ORDER — SODIUM CHLORIDE 0.9 % (FLUSH) 0.9 %
1-10 SYRINGE (ML) INJECTION AS NEEDED
Status: DISCONTINUED | OUTPATIENT
Start: 2017-03-24 | End: 2017-03-24 | Stop reason: HOSPADM

## 2017-03-24 RX ORDER — PROMETHAZINE HYDROCHLORIDE 25 MG/1
25 SUPPOSITORY RECTAL ONCE AS NEEDED
Status: DISCONTINUED | OUTPATIENT
Start: 2017-03-24 | End: 2017-03-24 | Stop reason: HOSPADM

## 2017-03-24 RX ORDER — HYDROMORPHONE HYDROCHLORIDE 1 MG/ML
0.5 INJECTION, SOLUTION INTRAMUSCULAR; INTRAVENOUS; SUBCUTANEOUS
Status: DISCONTINUED | OUTPATIENT
Start: 2017-03-24 | End: 2017-03-24 | Stop reason: HOSPADM

## 2017-03-24 RX ORDER — MIDAZOLAM HYDROCHLORIDE 1 MG/ML
INJECTION INTRAMUSCULAR; INTRAVENOUS AS NEEDED
Status: DISCONTINUED | OUTPATIENT
Start: 2017-03-24 | End: 2017-03-24 | Stop reason: SURG

## 2017-03-24 RX ORDER — PROPOFOL 10 MG/ML
VIAL (ML) INTRAVENOUS CONTINUOUS PRN
Status: DISCONTINUED | OUTPATIENT
Start: 2017-03-24 | End: 2017-03-24 | Stop reason: SURG

## 2017-03-24 RX ORDER — OXYCODONE AND ACETAMINOPHEN 7.5; 325 MG/1; MG/1
1 TABLET ORAL ONCE AS NEEDED
Status: DISCONTINUED | OUTPATIENT
Start: 2017-03-24 | End: 2017-03-24 | Stop reason: HOSPADM

## 2017-03-24 RX ORDER — HYDROCODONE BITARTRATE AND ACETAMINOPHEN 7.5; 325 MG/1; MG/1
1 TABLET ORAL ONCE AS NEEDED
Status: DISCONTINUED | OUTPATIENT
Start: 2017-03-24 | End: 2017-03-24 | Stop reason: HOSPADM

## 2017-03-24 RX ORDER — MIDAZOLAM HYDROCHLORIDE 1 MG/ML
1 INJECTION INTRAMUSCULAR; INTRAVENOUS
Status: DISCONTINUED | OUTPATIENT
Start: 2017-03-24 | End: 2017-03-24 | Stop reason: HOSPADM

## 2017-03-24 RX ORDER — FLUMAZENIL 0.1 MG/ML
0.2 INJECTION INTRAVENOUS AS NEEDED
Status: DISCONTINUED | OUTPATIENT
Start: 2017-03-24 | End: 2017-03-24 | Stop reason: HOSPADM

## 2017-03-24 RX ADMIN — CALCIUM GLUCONATE 2 G: 94 INJECTION, SOLUTION INTRAVENOUS at 19:17

## 2017-03-24 RX ADMIN — FAMOTIDINE 20 MG: 10 INJECTION, SOLUTION INTRAVENOUS at 07:56

## 2017-03-24 RX ADMIN — PROPOFOL 50 MCG/KG/MIN: 10 INJECTION, EMULSION INTRAVENOUS at 08:07

## 2017-03-24 RX ADMIN — HYDROCORTISONE SODIUM SUCCINATE 50 MG: 100 INJECTION, POWDER, FOR SOLUTION INTRAMUSCULAR; INTRAVENOUS at 16:07

## 2017-03-24 RX ADMIN — MIDAZOLAM HYDROCHLORIDE 2 MG: 1 INJECTION, SOLUTION INTRAMUSCULAR; INTRAVENOUS at 08:04

## 2017-03-24 RX ADMIN — PANTOPRAZOLE SODIUM 40 MG: 40 INJECTION, POWDER, FOR SOLUTION INTRAVENOUS at 05:16

## 2017-03-24 RX ADMIN — CEFTRIAXONE SODIUM 2 G: 2 INJECTION, SOLUTION INTRAVENOUS at 20:54

## 2017-03-24 RX ADMIN — FENTANYL CITRATE 25 MCG: 50 INJECTION INTRAMUSCULAR; INTRAVENOUS at 16:12

## 2017-03-24 RX ADMIN — SODIUM CHLORIDE, POTASSIUM CHLORIDE, SODIUM LACTATE AND CALCIUM CHLORIDE 9 ML/HR: 600; 310; 30; 20 INJECTION, SOLUTION INTRAVENOUS at 07:00

## 2017-03-24 RX ADMIN — LIDOCAINE HYDROCHLORIDE 50 MG: 20 INJECTION, SOLUTION INFILTRATION; PERINEURAL at 08:07

## 2017-03-24 RX ADMIN — HYDROCORTISONE SODIUM SUCCINATE 100 MG: 100 INJECTION, POWDER, FOR SOLUTION INTRAMUSCULAR; INTRAVENOUS at 03:38

## 2017-03-24 RX ADMIN — MIDAZOLAM 1 MG: 1 INJECTION INTRAMUSCULAR; INTRAVENOUS at 07:58

## 2017-03-24 RX ADMIN — ONDANSETRON 4 MG: 2 INJECTION INTRAMUSCULAR; INTRAVENOUS at 12:24

## 2017-03-24 RX ADMIN — MIDAZOLAM HYDROCHLORIDE 1 MG: 1 INJECTION, SOLUTION INTRAMUSCULAR; INTRAVENOUS at 08:25

## 2017-03-24 RX ADMIN — FENTANYL CITRATE 25 MCG: 50 INJECTION INTRAMUSCULAR; INTRAVENOUS at 12:28

## 2017-03-24 NOTE — ANESTHESIA POSTPROCEDURE EVALUATION
Patient: Ismael Ball    Procedure Summary     Date Anesthesia Start Anesthesia Stop Room / Location    03/24/17 0802 0855  [facility] / [facility]       Procedure Diagnosis Surgeon Provider    ENDOSCOPIC RETROGRADE CHOLANGIOPANCREATOGRAPHY, STENT REMOVAL, STENT PLACEMENT, CHOLANGIOGRAM (N/A ) Ascending cholangitis  (Ascending cholangitis [K83.0]) MD Varinder Gordon MD          Anesthesia Type: general, MAC  Last vitals  /92 (03/24/17 0851)    Temp      Pulse 69 (03/24/17 0851)   Resp 14 (03/24/17 0851)    SpO2 97 % (03/24/17 0851)      Post Anesthesia Care and Evaluation    Patient location during evaluation: PACU  Patient participation: complete - patient participated  Level of consciousness: awake and alert  Pain management: adequate  Airway patency: patent  Anesthetic complications: No anesthetic complications    Cardiovascular status: acceptable  Respiratory status: acceptable  Hydration status: acceptable

## 2017-03-24 NOTE — ANESTHESIA PREPROCEDURE EVALUATION
Anesthesia Evaluation     Patient summary reviewed and Nursing notes reviewed   history of anesthetic complications:  NPO Status: > 8 hours   Airway   Mallampati: II  Neck ROM: full  no difficulty expected  Dental - normal exam     Pulmonary     breath sounds clear to auscultation  Cardiovascular     Rhythm: regular    (+) hypertension,       Neuro/Psych  (+) CVA,    GI/Hepatic/Renal/Endo    (+)  hypothyroidism,     Musculoskeletal     Abdominal    Substance History      OB/GYN          Other                                    Anesthesia Plan    ASA 3     general   (Was in ICU for sepsis)  intravenous induction   Anesthetic plan and risks discussed with patient.

## 2017-03-25 LAB
ALBUMIN SERPL-MCNC: 2.4 G/DL (ref 3.5–5.2)
ALBUMIN SERPL-MCNC: 2.6 G/DL (ref 3.5–5.2)
ALP SERPL-CCNC: 174 U/L (ref 39–117)
ALT SERPL W P-5'-P-CCNC: 148 U/L (ref 1–41)
ANION GAP SERPL CALCULATED.3IONS-SCNC: 16.4 MMOL/L
AST SERPL-CCNC: 57 U/L (ref 1–40)
BACTERIA SPEC AEROBE CULT: ABNORMAL
BILIRUB CONJ SERPL-MCNC: 0.9 MG/DL (ref 0–0.3)
BILIRUB INDIRECT SERPL-MCNC: 0.6 MG/DL
BILIRUB SERPL-MCNC: 1.5 MG/DL (ref 0.1–1.2)
BUN BLD-MCNC: 58 MG/DL (ref 8–23)
BUN/CREAT SERPL: 26.7 (ref 7–25)
CA-I BLD-MCNC: 4.8 MG/DL (ref 4.6–5.4)
CA-I SERPL ISE-MCNC: 1.2 MMOL/L (ref 1.1–1.35)
CALCIUM SPEC-SCNC: 8.6 MG/DL (ref 8.6–10.5)
CHLORIDE SERPL-SCNC: 105 MMOL/L (ref 98–107)
CO2 SERPL-SCNC: 22.6 MMOL/L (ref 22–29)
CREAT BLD-MCNC: 2.17 MG/DL (ref 0.76–1.27)
GFR SERPL CREATININE-BSD FRML MDRD: 30 ML/MIN/1.73
GLUCOSE BLD-MCNC: 116 MG/DL (ref 65–99)
GRAM STN SPEC: ABNORMAL
GRAM STN SPEC: ABNORMAL
MAGNESIUM SERPL-MCNC: 2.6 MG/DL (ref 1.6–2.4)
MRSA SPEC QL CULT: NORMAL
PHOSPHATE SERPL-MCNC: 4.8 MG/DL (ref 2.5–4.5)
POTASSIUM BLD-SCNC: 3.5 MMOL/L (ref 3.5–5.2)
PROT SERPL-MCNC: 5.4 G/DL (ref 6–8.5)
SODIUM BLD-SCNC: 144 MMOL/L (ref 136–145)

## 2017-03-25 PROCEDURE — 63710000001 PREDNISONE PER 5 MG: Performed by: INTERNAL MEDICINE

## 2017-03-25 PROCEDURE — 82330 ASSAY OF CALCIUM: CPT | Performed by: INTERNAL MEDICINE

## 2017-03-25 PROCEDURE — 83735 ASSAY OF MAGNESIUM: CPT | Performed by: INTERNAL MEDICINE

## 2017-03-25 PROCEDURE — 25010000003 CEFTRIAXONE PER 250 MG: Performed by: INTERNAL MEDICINE

## 2017-03-25 PROCEDURE — 80069 RENAL FUNCTION PANEL: CPT | Performed by: INTERNAL MEDICINE

## 2017-03-25 PROCEDURE — 25010000002 HYDROCORTISONE SODIUM SUCCINATE 100 MG RECONSTITUTED SOLUTION: Performed by: INTERNAL MEDICINE

## 2017-03-25 PROCEDURE — 80076 HEPATIC FUNCTION PANEL: CPT | Performed by: INTERNAL MEDICINE

## 2017-03-25 RX ORDER — PREDNISONE 1 MG/1
5 TABLET ORAL 2 TIMES DAILY WITH MEALS
Status: DISCONTINUED | OUTPATIENT
Start: 2017-03-25 | End: 2017-03-28 | Stop reason: HOSPADM

## 2017-03-25 RX ORDER — LEVOTHYROXINE SODIUM 175 UG/1
175 TABLET ORAL
Status: DISCONTINUED | OUTPATIENT
Start: 2017-03-26 | End: 2017-03-28 | Stop reason: HOSPADM

## 2017-03-25 RX ORDER — POTASSIUM CHLORIDE 1.5 G/1.77G
40 POWDER, FOR SOLUTION ORAL ONCE
Status: COMPLETED | OUTPATIENT
Start: 2017-03-25 | End: 2017-03-25

## 2017-03-25 RX ORDER — CLOPIDOGREL BISULFATE 75 MG/1
75 TABLET ORAL DAILY
Status: DISCONTINUED | OUTPATIENT
Start: 2017-03-25 | End: 2017-03-28 | Stop reason: HOSPADM

## 2017-03-25 RX ORDER — VALACYCLOVIR HYDROCHLORIDE 500 MG/1
1000 TABLET, FILM COATED ORAL 2 TIMES DAILY
Status: COMPLETED | OUTPATIENT
Start: 2017-03-25 | End: 2017-03-26

## 2017-03-25 RX ORDER — BUMETANIDE 0.25 MG/ML
2 INJECTION INTRAMUSCULAR; INTRAVENOUS 2 TIMES DAILY
Status: DISCONTINUED | OUTPATIENT
Start: 2017-03-25 | End: 2017-03-26

## 2017-03-25 RX ORDER — HYDROCORTISONE 10 MG/1
20 TABLET ORAL 2 TIMES DAILY WITH MEALS
Status: COMPLETED | OUTPATIENT
Start: 2017-03-25 | End: 2017-03-26

## 2017-03-25 RX ADMIN — PREDNISONE 5 MG: 5 TABLET ORAL at 08:41

## 2017-03-25 RX ADMIN — HYDROCORTISONE 20 MG: 10 TABLET ORAL at 17:23

## 2017-03-25 RX ADMIN — VALACYCLOVIR 1000 MG: 500 TABLET, FILM COATED ORAL at 17:23

## 2017-03-25 RX ADMIN — PREDNISONE 5 MG: 5 TABLET ORAL at 17:23

## 2017-03-25 RX ADMIN — CEFTRIAXONE SODIUM 2 G: 2 INJECTION, SOLUTION INTRAVENOUS at 21:08

## 2017-03-25 RX ADMIN — BUMETANIDE 2 MG: 0.25 INJECTION INTRAMUSCULAR; INTRAVENOUS at 17:23

## 2017-03-25 RX ADMIN — CLOPIDOGREL 75 MG: 75 TABLET, FILM COATED ORAL at 15:34

## 2017-03-25 RX ADMIN — PANTOPRAZOLE SODIUM 40 MG: 40 INJECTION, POWDER, FOR SOLUTION INTRAVENOUS at 05:06

## 2017-03-25 RX ADMIN — HYDROCORTISONE SODIUM SUCCINATE 50 MG: 100 INJECTION, POWDER, FOR SOLUTION INTRAMUSCULAR; INTRAVENOUS at 03:23

## 2017-03-25 RX ADMIN — BUMETANIDE 2 MG: 0.25 INJECTION INTRAMUSCULAR; INTRAVENOUS at 09:25

## 2017-03-25 RX ADMIN — POTASSIUM CHLORIDE 40 MEQ: 1.5 POWDER, FOR SOLUTION ORAL at 09:25

## 2017-03-26 PROCEDURE — 63710000001 PREDNISONE PER 5 MG: Performed by: INTERNAL MEDICINE

## 2017-03-26 PROCEDURE — 25010000003 CEFTRIAXONE PER 250 MG: Performed by: INTERNAL MEDICINE

## 2017-03-26 RX ORDER — BUMETANIDE 0.25 MG/ML
1 INJECTION INTRAMUSCULAR; INTRAVENOUS 2 TIMES DAILY
Status: DISCONTINUED | OUTPATIENT
Start: 2017-03-26 | End: 2017-03-27

## 2017-03-26 RX ORDER — POTASSIUM CHLORIDE 750 MG/1
40 CAPSULE, EXTENDED RELEASE ORAL ONCE
Status: COMPLETED | OUTPATIENT
Start: 2017-03-26 | End: 2017-03-26

## 2017-03-26 RX ADMIN — POTASSIUM CHLORIDE 40 MEQ: 750 CAPSULE, EXTENDED RELEASE ORAL at 14:36

## 2017-03-26 RX ADMIN — CLOPIDOGREL 75 MG: 75 TABLET, FILM COATED ORAL at 08:18

## 2017-03-26 RX ADMIN — CEFTRIAXONE SODIUM 2 G: 2 INJECTION, SOLUTION INTRAVENOUS at 20:11

## 2017-03-26 RX ADMIN — BUMETANIDE 1 MG: 0.25 INJECTION, SOLUTION INTRAMUSCULAR; INTRAVENOUS at 17:02

## 2017-03-26 RX ADMIN — HYDROCORTISONE 20 MG: 10 TABLET ORAL at 08:18

## 2017-03-26 RX ADMIN — VALACYCLOVIR 1000 MG: 500 TABLET, FILM COATED ORAL at 08:18

## 2017-03-26 RX ADMIN — PREDNISONE 5 MG: 5 TABLET ORAL at 08:18

## 2017-03-26 RX ADMIN — BUMETANIDE 2 MG: 0.25 INJECTION INTRAMUSCULAR; INTRAVENOUS at 08:19

## 2017-03-26 RX ADMIN — LEVOTHYROXINE SODIUM 175 MCG: 175 TABLET ORAL at 05:23

## 2017-03-26 RX ADMIN — PREDNISONE 5 MG: 5 TABLET ORAL at 17:31

## 2017-03-27 PROCEDURE — 63710000001 PREDNISONE PER 5 MG: Performed by: INTERNAL MEDICINE

## 2017-03-27 RX ADMIN — BUMETANIDE 1 MG: 0.25 INJECTION, SOLUTION INTRAMUSCULAR; INTRAVENOUS at 11:05

## 2017-03-27 RX ADMIN — LEVOTHYROXINE SODIUM 175 MCG: 175 TABLET ORAL at 06:38

## 2017-03-27 RX ADMIN — PREDNISONE 5 MG: 5 TABLET ORAL at 11:05

## 2017-03-27 RX ADMIN — PREDNISONE 5 MG: 5 TABLET ORAL at 19:26

## 2017-03-27 RX ADMIN — CLOPIDOGREL 75 MG: 75 TABLET, FILM COATED ORAL at 11:05

## 2017-03-28 VITALS
WEIGHT: 184.3 LBS | OXYGEN SATURATION: 95 % | HEIGHT: 70 IN | DIASTOLIC BLOOD PRESSURE: 71 MMHG | RESPIRATION RATE: 16 BRPM | SYSTOLIC BLOOD PRESSURE: 129 MMHG | HEART RATE: 58 BPM | TEMPERATURE: 98.1 F | BODY MASS INDEX: 26.39 KG/M2

## 2017-03-28 LAB
ALBUMIN SERPL-MCNC: 3 G/DL (ref 3.5–5.2)
ALBUMIN/GLOB SERPL: 1 G/DL
ALP SERPL-CCNC: 135 U/L (ref 39–117)
ALT SERPL W P-5'-P-CCNC: 63 U/L (ref 1–41)
ANION GAP SERPL CALCULATED.3IONS-SCNC: 13.2 MMOL/L
AST SERPL-CCNC: 20 U/L (ref 1–40)
BASOPHILS # BLD AUTO: 0.01 10*3/MM3 (ref 0–0.2)
BASOPHILS NFR BLD AUTO: 0.1 % (ref 0–1.5)
BILIRUB SERPL-MCNC: 1.2 MG/DL (ref 0.1–1.2)
BUN BLD-MCNC: 52 MG/DL (ref 8–23)
BUN/CREAT SERPL: 42.3 (ref 7–25)
CALCIUM SPEC-SCNC: 8.8 MG/DL (ref 8.6–10.5)
CHLORIDE SERPL-SCNC: 96 MMOL/L (ref 98–107)
CO2 SERPL-SCNC: 37.8 MMOL/L (ref 22–29)
CREAT BLD-MCNC: 1.23 MG/DL (ref 0.76–1.27)
DEPRECATED RDW RBC AUTO: 48.1 FL (ref 37–54)
EOSINOPHIL # BLD AUTO: 0.18 10*3/MM3 (ref 0–0.7)
EOSINOPHIL NFR BLD AUTO: 2.6 % (ref 0.3–6.2)
ERYTHROCYTE [DISTWIDTH] IN BLOOD BY AUTOMATED COUNT: 14.2 % (ref 11.5–14.5)
GFR SERPL CREATININE-BSD FRML MDRD: 57 ML/MIN/1.73
GLOBULIN UR ELPH-MCNC: 3 GM/DL
GLUCOSE BLD-MCNC: 101 MG/DL (ref 65–99)
HCT VFR BLD AUTO: 41.9 % (ref 40.4–52.2)
HGB BLD-MCNC: 13.8 G/DL (ref 13.7–17.6)
IMM GRANULOCYTES # BLD: 0.03 10*3/MM3 (ref 0–0.03)
IMM GRANULOCYTES NFR BLD: 0.4 % (ref 0–0.5)
LYMPHOCYTES # BLD AUTO: 2.54 10*3/MM3 (ref 0.9–4.8)
LYMPHOCYTES NFR BLD AUTO: 36.3 % (ref 19.6–45.3)
MAGNESIUM SERPL-MCNC: 1.6 MG/DL (ref 1.6–2.4)
MCH RBC QN AUTO: 30.5 PG (ref 27–32.7)
MCHC RBC AUTO-ENTMCNC: 32.9 G/DL (ref 32.6–36.4)
MCV RBC AUTO: 92.5 FL (ref 79.8–96.2)
MONOCYTES # BLD AUTO: 0.32 10*3/MM3 (ref 0.2–1.2)
MONOCYTES NFR BLD AUTO: 4.6 % (ref 5–12)
NEUTROPHILS # BLD AUTO: 3.91 10*3/MM3 (ref 1.9–8.1)
NEUTROPHILS NFR BLD AUTO: 56 % (ref 42.7–76)
PHOSPHATE SERPL-MCNC: 4.5 MG/DL (ref 2.5–4.5)
PLATELET # BLD AUTO: 147 10*3/MM3 (ref 140–500)
PMV BLD AUTO: 11.3 FL (ref 6–12)
POTASSIUM BLD-SCNC: 2.7 MMOL/L (ref 3.5–5.2)
POTASSIUM BLD-SCNC: 3.8 MMOL/L (ref 3.5–5.2)
PROT SERPL-MCNC: 6 G/DL (ref 6–8.5)
RBC # BLD AUTO: 4.53 10*6/MM3 (ref 4.6–6)
SODIUM BLD-SCNC: 147 MMOL/L (ref 136–145)
WBC NRBC COR # BLD: 6.99 10*3/MM3 (ref 4.5–10.7)

## 2017-03-28 PROCEDURE — 84132 ASSAY OF SERUM POTASSIUM: CPT | Performed by: INTERNAL MEDICINE

## 2017-03-28 PROCEDURE — 25010000002 MAGNESIUM SULFATE IN D5W 1G/100ML (PREMIX) 10-5 MG/ML-% SOLUTION: Performed by: INTERNAL MEDICINE

## 2017-03-28 PROCEDURE — 83735 ASSAY OF MAGNESIUM: CPT | Performed by: INTERNAL MEDICINE

## 2017-03-28 PROCEDURE — 84100 ASSAY OF PHOSPHORUS: CPT | Performed by: INTERNAL MEDICINE

## 2017-03-28 PROCEDURE — 80053 COMPREHEN METABOLIC PANEL: CPT | Performed by: INTERNAL MEDICINE

## 2017-03-28 PROCEDURE — 63710000001 PREDNISONE PER 5 MG: Performed by: INTERNAL MEDICINE

## 2017-03-28 PROCEDURE — 85025 COMPLETE CBC W/AUTO DIFF WBC: CPT | Performed by: INTERNAL MEDICINE

## 2017-03-28 PROCEDURE — 25010000003 CEFTRIAXONE PER 250 MG: Performed by: INTERNAL MEDICINE

## 2017-03-28 RX ORDER — CEFDINIR 300 MG/1
300 CAPSULE ORAL 2 TIMES DAILY
Qty: 10 CAPSULE | Refills: 0 | Status: SHIPPED | OUTPATIENT
Start: 2017-03-28 | End: 2017-04-20

## 2017-03-28 RX ORDER — POTASSIUM CHLORIDE 7.45 MG/ML
10 INJECTION INTRAVENOUS
Status: DISCONTINUED | OUTPATIENT
Start: 2017-03-28 | End: 2017-03-28 | Stop reason: HOSPADM

## 2017-03-28 RX ORDER — POTASSIUM CHLORIDE 1.5 G/1.77G
40 POWDER, FOR SOLUTION ORAL AS NEEDED
Status: DISCONTINUED | OUTPATIENT
Start: 2017-03-28 | End: 2017-03-28 | Stop reason: CLARIF

## 2017-03-28 RX ORDER — POTASSIUM CHLORIDE 750 MG/1
40 CAPSULE, EXTENDED RELEASE ORAL AS NEEDED
Status: DISCONTINUED | OUTPATIENT
Start: 2017-03-28 | End: 2017-03-28 | Stop reason: HOSPADM

## 2017-03-28 RX ADMIN — POTASSIUM CHLORIDE 40 MEQ: 750 CAPSULE, EXTENDED RELEASE ORAL at 11:49

## 2017-03-28 RX ADMIN — CEFTRIAXONE SODIUM 2 G: 2 INJECTION, SOLUTION INTRAVENOUS at 20:35

## 2017-03-28 RX ADMIN — POTASSIUM CHLORIDE 40 MEQ: 750 CAPSULE, EXTENDED RELEASE ORAL at 17:35

## 2017-03-28 RX ADMIN — CEFTRIAXONE SODIUM 2 G: 2 INJECTION, SOLUTION INTRAVENOUS at 00:14

## 2017-03-28 RX ADMIN — PREDNISONE 5 MG: 5 TABLET ORAL at 11:49

## 2017-03-28 RX ADMIN — PREDNISONE 5 MG: 5 TABLET ORAL at 19:28

## 2017-03-28 RX ADMIN — MAGNESIUM SULFATE HEPTAHYDRATE 2 G: 1 INJECTION, SOLUTION INTRAVENOUS at 15:09

## 2017-03-28 RX ADMIN — LEVOTHYROXINE SODIUM 175 MCG: 175 TABLET ORAL at 06:37

## 2017-03-28 RX ADMIN — POTASSIUM CHLORIDE 40 MEQ: 750 CAPSULE, EXTENDED RELEASE ORAL at 15:10

## 2017-03-28 RX ADMIN — CLOPIDOGREL 75 MG: 75 TABLET, FILM COATED ORAL at 11:49

## 2017-04-18 ENCOUNTER — OFFICE VISIT (OUTPATIENT)
Dept: GASTROENTEROLOGY | Facility: CLINIC | Age: 77
End: 2017-04-18

## 2017-04-18 VITALS — BODY MASS INDEX: 24.45 KG/M2 | HEIGHT: 70 IN | WEIGHT: 170.8 LBS

## 2017-04-18 DIAGNOSIS — Z46.89 ENCOUNTER FOR REPLACEMENT OF BILIARY STENT: ICD-10-CM

## 2017-04-18 DIAGNOSIS — K83.1 BILE DUCT OBSTRUCTION: Primary | ICD-10-CM

## 2017-04-18 PROBLEM — C61 MALIGNANT NEOPLASM OF PROSTATE (HCC): Status: ACTIVE | Noted: 2017-04-18

## 2017-04-18 PROCEDURE — 99213 OFFICE O/P EST LOW 20 MIN: CPT | Performed by: INTERNAL MEDICINE

## 2017-04-18 RX ORDER — IRBESARTAN 150 MG/1
75 TABLET ORAL DAILY
COMMUNITY
Start: 2015-04-17 | End: 2017-05-19 | Stop reason: ALTCHOICE

## 2017-04-18 NOTE — PROGRESS NOTES
Chief Complaint   Patient presents with   • Follow-up     from surgery        Ismael Ball is a  76 y.o. male here for a follow up visit for ERCP follow-up.    HPI    Patient 76-year-old male with history of hyperlipidemia hypertension hypothyroidism as well as prostate cancer with metastases who was seen initially by Dr. Ibarra for bile duct obstruction.  Evaluation suggested possible inez hepatis lymph nodes causing the obstruction.  Initial pathology of the bile duct was unremarkable for neoplastic cells.  Patient presented to the hospital late March with ascending cholangitis just several weeks after stent placement.  During ERCP for the cholangitis patient was found with an obstructed stent and large amount of bile and pus above the obstruction.  Patient grew out Escherichia coli from his bloodstream.  Patient now markedly improved with some mild shortness of breath on exertion but otherwise doing well.    Past Medical History:   Diagnosis Date   • Cancer    • CVA (cerebral vascular accident)    • Hyperlipidemia    • Hypertension    • Hypothyroidism    • PONV (postoperative nausea and vomiting)          Current Outpatient Prescriptions:   •  Ascorbic Acid (VITAMIN C PO), Take  by mouth., Disp: , Rfl:   •  clopidogrel (PLAVIX) 75 MG tablet, Take 1 tablet by mouth Daily for 180 days., Disp: 90 tablet, Rfl: 1  •  HYDROcodone-acetaminophen (NORCO) 5-325 MG per tablet, TK 1 T PO Q 6H PRN P, Disp: , Rfl: 0  •  irbesartan (AVAPRO) 150 MG tablet, , Disp: , Rfl:   •  levothyroxine (SYNTHROID, LEVOTHROID) 175 MCG tablet, Take 1 tablet by mouth Daily for 180 days., Disp: 90 tablet, Rfl: 1  •  cefdinir (OMNICEF) 300 MG capsule, Take 1 capsule by mouth 2 (Two) Times a Day., Disp: 10 capsule, Rfl: 0  •  predniSONE (DELTASONE) 5 MG tablet, Take 5 mg by mouth 2 (Two) Times a Day., Disp: , Rfl:   •  ZYTIGA 250 MG chemo tablet, Take 1,000 mg by mouth Daily., Disp: , Rfl:     Allergies   Allergen Reactions   • Penicillins       Unknown, many years ago per pt when he was a child; Tolerated cephalosporins (cefepime) during 3/2017 admission.    • Statins      Myalgia       Social History     Social History   • Marital status:      Spouse name: N/A   • Number of children: N/A   • Years of education: N/A     Occupational History   • Not on file.     Social History Main Topics   • Smoking status: Never Smoker   • Smokeless tobacco: Never Used      Comment: cigar   • Alcohol use No   • Drug use: Not on file   • Sexual activity: Not on file     Other Topics Concern   • Not on file     Social History Narrative       Family History   Problem Relation Age of Onset   • Arthritis Mother    • Arthritis Father    • Cancer Father    • Heart disease Sister        Review of Systems   Constitutional: Negative.    Respiratory: Positive for cough and shortness of breath. Negative for choking, chest tightness, wheezing and stridor.    Cardiovascular: Negative.    Gastrointestinal: Negative.    Musculoskeletal: Negative.    Skin: Negative.        There were no vitals filed for this visit.    Physical Exam   Constitutional: He is oriented to person, place, and time. He appears well-developed and well-nourished.   HENT:   Head: Normocephalic and atraumatic.   Eyes: EOM are normal. Pupils are equal, round, and reactive to light. No scleral icterus.   Cardiovascular: Normal rate, regular rhythm and normal heart sounds.  Exam reveals no gallop and no friction rub.    No murmur heard.  Pulmonary/Chest: Effort normal. He has no wheezes. He has no rales.   Abdominal: Soft. Bowel sounds are normal. He exhibits no distension and no mass. There is no tenderness. No hernia.   Musculoskeletal: Normal range of motion.   Neurological: He is alert and oriented to person, place, and time.   Skin: Skin is dry.   Psychiatric: He has a normal mood and affect. His behavior is normal.       Admission on 03/22/2017, Discharged on 03/28/2017   No results displayed because visit  has over 200 results.          Ismael was seen today for follow-up.    Diagnoses and all orders for this visit:    Bile duct obstruction  -     Case Request; Standing  -     Case Request    Encounter for replacement of biliary stent  -     Case Request; Standing  -     Case Request    Other orders  -     Implement Anesthesia orders day of procedure.; Standing  -     Obtain informed consent; Standing      Patient 76-year-old male with history of prostate CA with inez hepatis adenopathy felt to be the cause of bile duct obstruction.  Patient admitted to the hospital in March with acute cholangitis related to Escherichia coli had biliary stent removed for a Wallstent.  Patient now returns after discussion with oncologist for repeat biopsy of the biliary tree.  Patient's initial pathologies have all been negative and concern for possible second malignancy oncologists would like repeat pathology.  Patient now was stable and was improving since cholangitis will recommend repeat ERCP with stent removal and spyglass to get repeat pathology to assess for second malignancy.  We'll follow clinically based on results.

## 2017-04-20 ENCOUNTER — OFFICE VISIT (OUTPATIENT)
Dept: FAMILY MEDICINE CLINIC | Facility: CLINIC | Age: 77
End: 2017-04-20

## 2017-04-20 VITALS
BODY MASS INDEX: 24.48 KG/M2 | DIASTOLIC BLOOD PRESSURE: 68 MMHG | SYSTOLIC BLOOD PRESSURE: 99 MMHG | WEIGHT: 171 LBS | HEART RATE: 77 BPM | TEMPERATURE: 97.6 F | RESPIRATION RATE: 16 BRPM | HEIGHT: 70 IN

## 2017-04-20 DIAGNOSIS — R06.02 SHORTNESS OF BREATH: ICD-10-CM

## 2017-04-20 DIAGNOSIS — Z09 HOSPITAL DISCHARGE FOLLOW-UP: Primary | ICD-10-CM

## 2017-04-20 DIAGNOSIS — A41.51 E. COLI SEPSIS (HCC): ICD-10-CM

## 2017-04-20 DIAGNOSIS — K83.09 ASCENDING CHOLANGITIS: ICD-10-CM

## 2017-04-20 DIAGNOSIS — R79.89 ELEVATED BRAIN NATRIURETIC PEPTIDE (BNP) LEVEL: ICD-10-CM

## 2017-04-20 DIAGNOSIS — K83.1 BILIARY STRICTURE: ICD-10-CM

## 2017-04-20 PROCEDURE — 99214 OFFICE O/P EST MOD 30 MIN: CPT | Performed by: FAMILY MEDICINE

## 2017-04-20 NOTE — PROGRESS NOTES
Chief Complaint   Patient presents with   • Hospital follow up       Subjective   This patient presents the office for hospital admission follow-up.  He was admitted to [facility] on 322 and discharged on 3/28/2017.  He was diagnosed with acute cholangitis.  He had had bile duct stent placed in his bile duct about 2-4 weeks prior to hospital visit.  The bile duct stent got blocked and was positive for Escherichia coli infection.  He had ERCP done to correct that blockage.  He was treated for infection with antibiotics.  They presumed he had pneumonia in addition to his bile duct infection.  He also had labs that were markedly abnormal involving his kidney and his liver.  He had consultations with gastroenterology and nephrology.  He continued to have some shortness of breath and not feeling well after being discharged from the hospital.  He did go back to the emergency room at [facility] on 4/14/2017.  The results from that visit are documented below.  He did not have any residual pneumonia.  He did have some bibasilar atelectasis.  Repeat labs showed normalization of his kidney function and liver function tests.  Pro BNP levels remain mildly elevated but are much improved from the original hospital stay.  He does have one visit scheduled for Dr. Evangelista on May 1 for repeat ERCP.  Over the last 3 days he has actually felt much better with better energy and better breathing.  He is not running fever.  We took several minutes today and reconciled his medication list.  Several of his medications have been discontinued.  Data Reviewed:  CTA CHEST PE PROTOCOL    CLINICAL INDICATION: Shortness of air    COMPARISON: February 22, 2017    TECHNIQUE: Multiple axial CT images of the chest were obtained following the administration of IV contrast per PE protocol. Three-D reformatted images were produced and reviewed. Dose reduction technique was utilized per ALARA protocol.    FINDINGS: No filling defects are identified  in the pulmonary arteries. Both main pulmonary arteries are prominent. There is calcified atherosclerosis and soft plaque formation at the thoracic aorta which is also ectatic and torturous. There is moderate   coronary artery calcification and cardiomegaly. No pericardial or pleural effusion is identified. No thoracic lymphadenopathy is seen.    The lung windows reveal mild linear and groundglass opacities at both lung bases right greater than left likely due to atelectasis. The airways are unremarkable.    Images of the upper abdomen reveal a stent in the common bile duct and pneumobilia. There is nodularity of the right adrenal gland and incompletely imaged is a probable mass in the left adrenal gland which was also present on an MRCP performed on   February 10, 2017. There is moderate stool in the colon and extensive diverticulosis. There is bilateral gynecomastia which is unchanged.    The bone windows reveal multiple sclerotic bone lesions. The patient has a history of prostate cancer these areas of sclerosis are present on the CT of the chest performed on February 22, 2017.    IMPRESSION:   1. No filling defects are seen in the pulmonary arteries to suggest pulmonary embolism. There is probable atelectasis of both lung bases right greater than left. No definite active disease is seen in the chest.  2. Atherosclerosis of the thoracic aorta which is ectatic and torturous. There is also moderate coronary artery calcification.  3. Incompletely imaged left adrenal gland mass which was also present on an MRCP performed on February 10, 2017.  4. Multiple sclerotic bone lesions likely metastatic disease from prostate cancer.    Dictated by: Blaine Larson M.D.    Images and Report reviewed and interpreted by: Blaine Larson M.D.    <PS><Electronically signed by: Blaine Larson M.D.>  04/14/2017 1709   AP portable chest dated April 14, 2017.        INDICATION: Short of air. Recent pneumonia.       "  COMPARISON: CT angiogram of the chest dated February 22, 2017.        FINDINGS: There is elevation of the right hemidiaphragm. No consolidation, effusion, pulmonary edema or pneumothorax. Cardiomediastinal silhouette is stable. There are atherosclerotic vascular calcifications. Aorta is ectatic. No acute osseous     abnormality.        IMPRESSION:    1. No acute cardiopulmonary disease.        Dictated by: Elena Randall M.D.        Images and Report reviewed and interpreted by: Elena Randall M.D.        <PS><Electronically signed by: Elena Randall M.D.>    04/14/2017 1553    4/14/2017 Labs; CMP normal. Pro , Toponin <0.012, WBC 6.2, HGB 12.5, blood cultures no growth x 2 samples    Social History   Substance Use Topics   • Smoking status: Never Smoker   • Smokeless tobacco: Never Used      Comment: cigar   • Alcohol use No       Review of Systems   Constitutional: Negative for fever.   Respiratory: Positive for shortness of breath.    Cardiovascular: Negative for chest pain.       Objective   BP 99/68  Pulse 77  Temp 97.6 °F (36.4 °C) (Oral)   Resp 16  Ht 70\" (177.8 cm)  Wt 171 lb (77.6 kg)  BMI 24.54 kg/m2  Body mass index is 24.54 kg/(m^2).  Physical Exam   Constitutional: He is cooperative. No distress.   Eyes: Conjunctivae and lids are normal.   Neck: Carotid bruit is not present. No tracheal deviation present.   Cardiovascular: Normal rate, regular rhythm and normal heart sounds.    No murmur heard.  Pulmonary/Chest: Effort normal and breath sounds normal.   Neurological: He is alert. He is not disoriented.   Skin: Skin is warm and dry.   Psychiatric: He has a normal mood and affect. His speech is normal and behavior is normal.   Vitals reviewed.      Assessment/Plan     Problem List Items Addressed This Visit        Respiratory    Shortness of breath    Relevant Orders    Ambulatory Referral to Cardiology       Digestive    Biliary stricture    Ascending cholangitis       Other    Elevated " brain natriuretic peptide (BNP) level    Relevant Orders    Ambulatory Referral to Cardiology    RESOLVED: E. coli sepsis      Other Visit Diagnoses     Hospital discharge follow-up    -  Primary          Outpatient Encounter Prescriptions as of 4/20/2017   Medication Sig Dispense Refill   • Ascorbic Acid (VITAMIN C PO) Take  by mouth.     • clopidogrel (PLAVIX) 75 MG tablet Take 1 tablet by mouth Daily for 180 days. 90 tablet 1   • irbesartan (AVAPRO) 150 MG tablet      • levothyroxine (SYNTHROID, LEVOTHROID) 175 MCG tablet Take 1 tablet by mouth Daily for 180 days. 90 tablet 1   • [DISCONTINUED] cefdinir (OMNICEF) 300 MG capsule Take 1 capsule by mouth 2 (Two) Times a Day. 10 capsule 0   • [DISCONTINUED] HYDROcodone-acetaminophen (NORCO) 5-325 MG per tablet TK 1 T PO Q 6H PRN P  0   • [DISCONTINUED] predniSONE (DELTASONE) 5 MG tablet Take 5 mg by mouth 2 (Two) Times a Day.     • [DISCONTINUED] ZYTIGA 250 MG chemo tablet Take 1,000 mg by mouth Daily.       No facility-administered encounter medications on file as of 4/20/2017.        Orders Placed This Encounter   Procedures   • Ambulatory Referral to Cardiology     Referral Priority:   Routine     Referral Type:   Consultation     Referral Reason:   Specialty Services Required     Referred to Provider:   Guero Salomon MD     Requested Specialty:   Cardiology     Number of Visits Requested:   1       Continue with current treatment plan.         RTC as needed or sooner if symptoms worsen or change

## 2017-05-01 ENCOUNTER — APPOINTMENT (OUTPATIENT)
Dept: GENERAL RADIOLOGY | Facility: HOSPITAL | Age: 77
End: 2017-05-01

## 2017-05-01 ENCOUNTER — ANESTHESIA EVENT (OUTPATIENT)
Dept: GASTROENTEROLOGY | Facility: HOSPITAL | Age: 77
End: 2017-05-01

## 2017-05-01 ENCOUNTER — HOSPITAL ENCOUNTER (OUTPATIENT)
Facility: HOSPITAL | Age: 77
Setting detail: HOSPITAL OUTPATIENT SURGERY
Discharge: HOME | End: 2017-05-01
Attending: INTERNAL MEDICINE | Admitting: INTERNAL MEDICINE

## 2017-05-01 ENCOUNTER — ANESTHESIA (OUTPATIENT)
Dept: GASTROENTEROLOGY | Facility: HOSPITAL | Age: 77
End: 2017-05-01

## 2017-05-01 VITALS
SYSTOLIC BLOOD PRESSURE: 126 MMHG | HEART RATE: 72 BPM | DIASTOLIC BLOOD PRESSURE: 72 MMHG | TEMPERATURE: 97.9 F | RESPIRATION RATE: 16 BRPM | BODY MASS INDEX: 24.05 KG/M2 | OXYGEN SATURATION: 98 % | HEIGHT: 70 IN | WEIGHT: 168 LBS

## 2017-05-01 DIAGNOSIS — Z46.89 ENCOUNTER FOR REPLACEMENT OF BILIARY STENT: ICD-10-CM

## 2017-05-01 DIAGNOSIS — K83.1 BILE DUCT OBSTRUCTION: ICD-10-CM

## 2017-05-01 PROCEDURE — 74328 X-RAY BILE DUCT ENDOSCOPY: CPT

## 2017-05-01 PROCEDURE — 25010000002 PROPOFOL 10 MG/ML EMULSION: Performed by: ANESTHESIOLOGY

## 2017-05-01 PROCEDURE — 43261 ENDO CHOLANGIOPANCREATOGRAPH: CPT | Performed by: INTERNAL MEDICINE

## 2017-05-01 PROCEDURE — 43273 ENDOSCOPIC PANCREATOSCOPY: CPT | Performed by: INTERNAL MEDICINE

## 2017-05-01 PROCEDURE — C1769 GUIDE WIRE: HCPCS | Performed by: INTERNAL MEDICINE

## 2017-05-01 PROCEDURE — 43275 ERCP REMOVE FORGN BODY DUCT: CPT | Performed by: INTERNAL MEDICINE

## 2017-05-01 PROCEDURE — 0 IOPAMIDOL 61 % SOLUTION: Performed by: INTERNAL MEDICINE

## 2017-05-01 PROCEDURE — 88305 TISSUE EXAM BY PATHOLOGIST: CPT | Performed by: INTERNAL MEDICINE

## 2017-05-01 PROCEDURE — 43274 ERCP DUCT STENT PLACEMENT: CPT | Performed by: INTERNAL MEDICINE

## 2017-05-01 PROCEDURE — C1874 STENT, COATED/COV W/DEL SYS: HCPCS | Performed by: INTERNAL MEDICINE

## 2017-05-01 DEVICE — WALLFLEX BILIARY RX COVERED 10X60
Type: IMPLANTABLE DEVICE | Site: BILE DUCT | Status: FUNCTIONAL
Brand: WALLFLEX™ BILIARY

## 2017-05-01 RX ORDER — SODIUM CHLORIDE 0.9 % (FLUSH) 0.9 %
3 SYRINGE (ML) INJECTION AS NEEDED
Status: DISCONTINUED | OUTPATIENT
Start: 2017-05-01 | End: 2017-05-01 | Stop reason: HOSPADM

## 2017-05-01 RX ORDER — PROPOFOL 10 MG/ML
VIAL (ML) INTRAVENOUS CONTINUOUS PRN
Status: DISCONTINUED | OUTPATIENT
Start: 2017-05-01 | End: 2017-05-01 | Stop reason: SURG

## 2017-05-01 RX ORDER — SODIUM CHLORIDE, SODIUM LACTATE, POTASSIUM CHLORIDE, CALCIUM CHLORIDE 600; 310; 30; 20 MG/100ML; MG/100ML; MG/100ML; MG/100ML
1000 INJECTION, SOLUTION INTRAVENOUS CONTINUOUS PRN
Status: DISCONTINUED | OUTPATIENT
Start: 2017-05-01 | End: 2017-05-01 | Stop reason: HOSPADM

## 2017-05-01 RX ORDER — LIDOCAINE HYDROCHLORIDE 10 MG/ML
0.5 INJECTION, SOLUTION INFILTRATION; PERINEURAL ONCE AS NEEDED
Status: DISCONTINUED | OUTPATIENT
Start: 2017-05-01 | End: 2017-05-01 | Stop reason: HOSPADM

## 2017-05-01 RX ORDER — PROPOFOL 10 MG/ML
VIAL (ML) INTRAVENOUS AS NEEDED
Status: DISCONTINUED | OUTPATIENT
Start: 2017-05-01 | End: 2017-05-01 | Stop reason: SURG

## 2017-05-01 RX ORDER — LIDOCAINE HYDROCHLORIDE 20 MG/ML
INJECTION, SOLUTION INFILTRATION; PERINEURAL AS NEEDED
Status: DISCONTINUED | OUTPATIENT
Start: 2017-05-01 | End: 2017-05-01 | Stop reason: SURG

## 2017-05-01 RX ADMIN — PROPOFOL 100 MCG/KG/MIN: 10 INJECTION, EMULSION INTRAVENOUS at 15:25

## 2017-05-01 RX ADMIN — SODIUM CHLORIDE, POTASSIUM CHLORIDE, SODIUM LACTATE AND CALCIUM CHLORIDE 1000 ML: 600; 310; 30; 20 INJECTION, SOLUTION INTRAVENOUS at 14:24

## 2017-05-01 RX ADMIN — SODIUM CHLORIDE, POTASSIUM CHLORIDE, SODIUM LACTATE AND CALCIUM CHLORIDE: 600; 310; 30; 20 INJECTION, SOLUTION INTRAVENOUS at 15:25

## 2017-05-01 RX ADMIN — PROPOFOL 150 MG: 10 INJECTION, EMULSION INTRAVENOUS at 15:25

## 2017-05-01 RX ADMIN — LIDOCAINE HYDROCHLORIDE 20 MG: 20 INJECTION, SOLUTION INFILTRATION; PERINEURAL at 15:25

## 2017-05-01 RX ADMIN — EPHEDRINE SULFATE 10 MG: 50 INJECTION INTRAMUSCULAR; INTRAVENOUS; SUBCUTANEOUS at 15:53

## 2017-05-01 RX ADMIN — EPHEDRINE SULFATE 5 MG: 50 INJECTION INTRAMUSCULAR; INTRAVENOUS; SUBCUTANEOUS at 15:50

## 2017-05-02 ENCOUNTER — PREP FOR SURGERY (OUTPATIENT)
Dept: OTHER | Facility: HOSPITAL | Age: 77
End: 2017-05-02

## 2017-05-03 LAB
CYTO UR: NORMAL
LAB AP CASE REPORT: NORMAL
LAB AP CLINICAL INFORMATION: NORMAL
Lab: NORMAL
PATH REPORT.FINAL DX SPEC: NORMAL
PATH REPORT.GROSS SPEC: NORMAL

## 2017-05-09 ENCOUNTER — TELEPHONE (OUTPATIENT)
Dept: GASTROENTEROLOGY | Facility: CLINIC | Age: 77
End: 2017-05-09

## 2017-05-19 ENCOUNTER — OFFICE VISIT (OUTPATIENT)
Dept: CARDIOLOGY | Facility: CLINIC | Age: 77
End: 2017-05-19

## 2017-05-19 VITALS
HEIGHT: 70 IN | BODY MASS INDEX: 24.77 KG/M2 | SYSTOLIC BLOOD PRESSURE: 110 MMHG | DIASTOLIC BLOOD PRESSURE: 60 MMHG | HEART RATE: 72 BPM | WEIGHT: 173 LBS

## 2017-05-19 DIAGNOSIS — R06.02 SHORTNESS OF BREATH: Primary | ICD-10-CM

## 2017-05-19 PROCEDURE — 99204 OFFICE O/P NEW MOD 45 MIN: CPT | Performed by: INTERNAL MEDICINE

## 2017-05-19 PROCEDURE — 93000 ELECTROCARDIOGRAM COMPLETE: CPT | Performed by: INTERNAL MEDICINE

## 2017-05-19 RX ORDER — IRBESARTAN 75 MG/1
75 TABLET ORAL NIGHTLY
COMMUNITY
End: 2017-08-30 | Stop reason: SDUPTHER

## 2017-06-15 ENCOUNTER — HOSPITAL ENCOUNTER (OUTPATIENT)
Dept: CARDIOLOGY | Facility: HOSPITAL | Age: 77
Discharge: HOME | End: 2017-06-15
Attending: INTERNAL MEDICINE

## 2017-06-15 ENCOUNTER — HOSPITAL ENCOUNTER (OUTPATIENT)
Dept: CARDIOLOGY | Facility: HOSPITAL | Age: 77
Discharge: HOME | End: 2017-06-15
Attending: INTERNAL MEDICINE | Admitting: INTERNAL MEDICINE

## 2017-06-15 VITALS
BODY MASS INDEX: 24.34 KG/M2 | SYSTOLIC BLOOD PRESSURE: 126 MMHG | HEART RATE: 82 BPM | WEIGHT: 170 LBS | DIASTOLIC BLOOD PRESSURE: 58 MMHG | HEIGHT: 70 IN

## 2017-06-15 DIAGNOSIS — R06.02 SHORTNESS OF BREATH: ICD-10-CM

## 2017-06-15 LAB
BH CV STRESS BP STAGE 1: NORMAL
BH CV STRESS BP STAGE 2: NORMAL
BH CV STRESS DURATION MIN STAGE 1: 3
BH CV STRESS DURATION MIN STAGE 2: 3
BH CV STRESS DURATION SEC STAGE 1: 0
BH CV STRESS DURATION SEC STAGE 2: 0
BH CV STRESS GRADE STAGE 1: 10
BH CV STRESS GRADE STAGE 2: 12
BH CV STRESS HR STAGE 1: 100
BH CV STRESS HR STAGE 2: 123
BH CV STRESS METS STAGE 1: 5
BH CV STRESS METS STAGE 2: 7.5
BH CV STRESS PROTOCOL 1: NORMAL
BH CV STRESS RECOVERY BP: NORMAL MMHG
BH CV STRESS RECOVERY HR: 83 BPM
BH CV STRESS SPEED STAGE 1: 1.7
BH CV STRESS SPEED STAGE 2: 2.5
BH CV STRESS STAGE 1: 1
BH CV STRESS STAGE 2: 2
MAXIMAL PREDICTED HEART RATE: 143 BPM
PERCENT MAX PREDICTED HR: 86.01 %
STRESS BASELINE BP: NORMAL MMHG
STRESS BASELINE HR: 75 BPM
STRESS PERCENT HR: 101 %
STRESS POST ESTIMATED WORKLOAD: 7.5 METS
STRESS POST EXERCISE DUR MIN: 6 MIN
STRESS POST EXERCISE DUR SEC: 0 SEC
STRESS POST PEAK BP: NORMAL MMHG
STRESS POST PEAK HR: 123 BPM
STRESS TARGET HR: 122 BPM

## 2017-06-15 PROCEDURE — 93306 TTE W/DOPPLER COMPLETE: CPT

## 2017-06-15 PROCEDURE — 93306 TTE W/DOPPLER COMPLETE: CPT | Performed by: INTERNAL MEDICINE

## 2017-06-16 LAB
ASCENDING AORTA: 3.5 CM
BH CV ECHO MEAS - ACS: 2.2 CM
BH CV ECHO MEAS - AO MAX PG (FULL): 2.2 MMHG
BH CV ECHO MEAS - AO MAX PG: 6.7 MMHG
BH CV ECHO MEAS - AO MEAN PG (FULL): 1.6 MMHG
BH CV ECHO MEAS - AO MEAN PG: 3.9 MMHG
BH CV ECHO MEAS - AO ROOT AREA (BSA CORRECTED): 1.7
BH CV ECHO MEAS - AO ROOT AREA: 8.5 CM^2
BH CV ECHO MEAS - AO ROOT DIAM: 3.3 CM
BH CV ECHO MEAS - AO V2 MAX: 129 CM/SEC
BH CV ECHO MEAS - AO V2 MEAN: 92.9 CM/SEC
BH CV ECHO MEAS - AO V2 VTI: 27.1 CM
BH CV ECHO MEAS - AVA(I,A): 3 CM^2
BH CV ECHO MEAS - AVA(I,D): 3 CM^2
BH CV ECHO MEAS - AVA(V,A): 3.1 CM^2
BH CV ECHO MEAS - AVA(V,D): 3.1 CM^2
BH CV ECHO MEAS - BSA(HAYCOCK): 2 M^2
BH CV ECHO MEAS - BSA: 1.9 M^2
BH CV ECHO MEAS - BZI_BMI: 24.4 KILOGRAMS/M^2
BH CV ECHO MEAS - BZI_METRIC_HEIGHT: 177.8 CM
BH CV ECHO MEAS - BZI_METRIC_WEIGHT: 77.1 KG
BH CV ECHO MEAS - CONTRAST EF (2CH): 53.1 ML/M^2
BH CV ECHO MEAS - CONTRAST EF 4CH: 60.3 ML/M^2
BH CV ECHO MEAS - EDV(MOD-SP2): 64 ML
BH CV ECHO MEAS - EDV(MOD-SP4): 73 ML
BH CV ECHO MEAS - EDV(TEICH): 103.3 ML
BH CV ECHO MEAS - EF(CUBED): 86.2 %
BH CV ECHO MEAS - EF(MOD-SP2): 53.1 %
BH CV ECHO MEAS - EF(MOD-SP4): 60.3 %
BH CV ECHO MEAS - EF(TEICH): 79.6 %
BH CV ECHO MEAS - ESV(MOD-SP2): 30 ML
BH CV ECHO MEAS - ESV(MOD-SP4): 29 ML
BH CV ECHO MEAS - ESV(TEICH): 21 ML
BH CV ECHO MEAS - FS: 48.3 %
BH CV ECHO MEAS - IVS/LVPW: 1.1
BH CV ECHO MEAS - IVSD: 1.2 CM
BH CV ECHO MEAS - LAT PEAK E' VEL: 7 CM/SEC
BH CV ECHO MEAS - LV DIASTOLIC VOL/BSA (35-75): 37.5 ML/M^2
BH CV ECHO MEAS - LV MASS(C)D: 208.3 GRAMS
BH CV ECHO MEAS - LV MASS(C)DI: 106.9 GRAMS/M^2
BH CV ECHO MEAS - LV MAX PG: 4.5 MMHG
BH CV ECHO MEAS - LV MEAN PG: 2.3 MMHG
BH CV ECHO MEAS - LV SYSTOLIC VOL/BSA (12-30): 14.9 ML/M^2
BH CV ECHO MEAS - LV V1 MAX: 105.7 CM/SEC
BH CV ECHO MEAS - LV V1 MEAN: 69.9 CM/SEC
BH CV ECHO MEAS - LV V1 VTI: 21.7 CM
BH CV ECHO MEAS - LVIDD: 4.7 CM
BH CV ECHO MEAS - LVIDS: 2.4 CM
BH CV ECHO MEAS - LVLD AP2: 6.3 CM
BH CV ECHO MEAS - LVLD AP4: 6.7 CM
BH CV ECHO MEAS - LVLS AP2: 6.1 CM
BH CV ECHO MEAS - LVLS AP4: 6.1 CM
BH CV ECHO MEAS - LVOT AREA (M): 3.8 CM^2
BH CV ECHO MEAS - LVOT AREA: 3.7 CM^2
BH CV ECHO MEAS - LVOT DIAM: 2.2 CM
BH CV ECHO MEAS - LVPWD: 1.1 CM
BH CV ECHO MEAS - MED PEAK E' VEL: 7 CM/SEC
BH CV ECHO MEAS - MV A DUR: 0.13 SEC
BH CV ECHO MEAS - MV A MAX VEL: 78.6 CM/SEC
BH CV ECHO MEAS - MV DEC SLOPE: 110.2 CM/SEC^2
BH CV ECHO MEAS - MV DEC TIME: 0.35 SEC
BH CV ECHO MEAS - MV E MAX VEL: 35.4 CM/SEC
BH CV ECHO MEAS - MV E/A: 0.45
BH CV ECHO MEAS - MV MAX PG: 3.3 MMHG
BH CV ECHO MEAS - MV MEAN PG: 1.1 MMHG
BH CV ECHO MEAS - MV P1/2T MAX VEL: 39.3 CM/SEC
BH CV ECHO MEAS - MV P1/2T: 104.4 MSEC
BH CV ECHO MEAS - MV V2 MAX: 90.9 CM/SEC
BH CV ECHO MEAS - MV V2 MEAN: 47.7 CM/SEC
BH CV ECHO MEAS - MV V2 VTI: 23.2 CM
BH CV ECHO MEAS - MVA P1/2T LCG: 5.6 CM^2
BH CV ECHO MEAS - MVA(P1/2T): 2.1 CM^2
BH CV ECHO MEAS - MVA(VTI): 3.5 CM^2
BH CV ECHO MEAS - PA ACC TIME: 0.1 SEC
BH CV ECHO MEAS - PA MAX PG (FULL): 1.8 MMHG
BH CV ECHO MEAS - PA MAX PG: 3.5 MMHG
BH CV ECHO MEAS - PA PR(ACCEL): 36.2 MMHG
BH CV ECHO MEAS - PA V2 MAX: 93.7 CM/SEC
BH CV ECHO MEAS - PULM A REVS DUR: 0.15 SEC
BH CV ECHO MEAS - PULM A REVS VEL: 29.2 CM/SEC
BH CV ECHO MEAS - PULM DIAS VEL: 26.2 CM/SEC
BH CV ECHO MEAS - PULM S/D: 2.9
BH CV ECHO MEAS - PULM SYS VEL: 76.6 CM/SEC
BH CV ECHO MEAS - PVA(V,A): 2.2 CM^2
BH CV ECHO MEAS - PVA(V,D): 2.2 CM^2
BH CV ECHO MEAS - QP/QS: 0.52
BH CV ECHO MEAS - RV MAX PG: 1.7 MMHG
BH CV ECHO MEAS - RV MEAN PG: 0.97 MMHG
BH CV ECHO MEAS - RV V1 MAX: 66 CM/SEC
BH CV ECHO MEAS - RV V1 MEAN: 46.8 CM/SEC
BH CV ECHO MEAS - RV V1 VTI: 13.5 CM
BH CV ECHO MEAS - RVOT AREA: 3.1 CM^2
BH CV ECHO MEAS - RVOT DIAM: 2 CM
BH CV ECHO MEAS - SI(AO): 118.8 ML/M^2
BH CV ECHO MEAS - SI(CUBED): 46.5 ML/M^2
BH CV ECHO MEAS - SI(LVOT): 41.6 ML/M^2
BH CV ECHO MEAS - SI(MOD-SP2): 17.5 ML/M^2
BH CV ECHO MEAS - SI(MOD-SP4): 22.6 ML/M^2
BH CV ECHO MEAS - SI(TEICH): 42.3 ML/M^2
BH CV ECHO MEAS - SV(AO): 231.5 ML
BH CV ECHO MEAS - SV(CUBED): 90.6 ML
BH CV ECHO MEAS - SV(LVOT): 81.1 ML
BH CV ECHO MEAS - SV(MOD-SP2): 34 ML
BH CV ECHO MEAS - SV(MOD-SP4): 44 ML
BH CV ECHO MEAS - SV(RVOT): 42.2 ML
BH CV ECHO MEAS - SV(TEICH): 82.3 ML
BH CV ECHO MEAS - TAPSE (>1.6): 2.1 CM2
BH CV ECHO MEAS - TR MAX VEL: 236.2 CM/SEC
BH CV XLRA - RV BASE: 2.3 CM
BH CV XLRA - TDI S': 12 CM/SEC
E/E' RATIO: 7
LEFT ATRIUM VOLUME INDEX: 19 ML/M2
LV EF 2D ECHO EST: 60 %
SINUS: 3.7 CM
STJ: 2.9 CM

## 2017-06-19 DIAGNOSIS — R94.39 ABNORMAL STRESS ECG WITH TREADMILL: Primary | ICD-10-CM

## 2017-06-19 DIAGNOSIS — R94.31 ABNORMAL ELECTROCARDIOGRAM: ICD-10-CM

## 2017-06-28 ENCOUNTER — HOSPITAL ENCOUNTER (OUTPATIENT)
Dept: CARDIOLOGY | Facility: HOSPITAL | Age: 77
Discharge: HOME | End: 2017-06-28
Attending: INTERNAL MEDICINE | Admitting: INTERNAL MEDICINE

## 2017-06-28 DIAGNOSIS — R94.31 ABNORMAL ELECTROCARDIOGRAM: ICD-10-CM

## 2017-06-28 DIAGNOSIS — R94.39 ABNORMAL STRESS ECG WITH TREADMILL: ICD-10-CM

## 2017-06-28 LAB
BH CV NUCLEAR PRIOR STUDY: 2
BH CV STRESS BP STAGE 1: NORMAL
BH CV STRESS BP STAGE 2: NORMAL
BH CV STRESS DURATION MIN STAGE 1: 3
BH CV STRESS DURATION MIN STAGE 2: 3
BH CV STRESS DURATION MIN STAGE 3: 3
BH CV STRESS DURATION SEC STAGE 1: 0
BH CV STRESS DURATION SEC STAGE 2: 0
BH CV STRESS DURATION SEC STAGE 3: 0
BH CV STRESS GRADE STAGE 1: 10
BH CV STRESS GRADE STAGE 2: 12
BH CV STRESS GRADE STAGE 3: 14
BH CV STRESS HR STAGE 1: 98
BH CV STRESS HR STAGE 2: 118
BH CV STRESS HR STAGE 3: 125
BH CV STRESS METS STAGE 1: 5
BH CV STRESS METS STAGE 2: 7.5
BH CV STRESS METS STAGE 3: 10
BH CV STRESS PROTOCOL 1: NORMAL
BH CV STRESS RECOVERY BP: NORMAL MMHG
BH CV STRESS RECOVERY HR: 91 BPM
BH CV STRESS SPEED STAGE 1: 1.7
BH CV STRESS SPEED STAGE 2: 2.5
BH CV STRESS SPEED STAGE 3: 3.4
BH CV STRESS STAGE 1: 1
BH CV STRESS STAGE 2: 2
BH CV STRESS STAGE 3: 3
LV EF NUC BP: 61 %
MAXIMAL PREDICTED HEART RATE: 143 BPM
PERCENT MAX PREDICTED HR: 87.41 %
STRESS BASELINE BP: NORMAL MMHG
STRESS BASELINE HR: 74 BPM
STRESS PERCENT HR: 103 %
STRESS POST ESTIMATED WORKLOAD: 8 METS
STRESS POST EXERCISE DUR MIN: 7 MIN
STRESS POST EXERCISE DUR SEC: 0 SEC
STRESS POST PEAK BP: NORMAL MMHG
STRESS POST PEAK HR: 125 BPM
STRESS TARGET HR: 122 BPM

## 2017-06-28 PROCEDURE — 93016 CV STRESS TEST SUPVJ ONLY: CPT | Performed by: INTERNAL MEDICINE

## 2017-06-28 PROCEDURE — 93017 CV STRESS TEST TRACING ONLY: CPT

## 2017-06-28 PROCEDURE — A9502 TC99M TETROFOSMIN: HCPCS | Performed by: INTERNAL MEDICINE

## 2017-06-28 PROCEDURE — 78452 HT MUSCLE IMAGE SPECT MULT: CPT | Performed by: INTERNAL MEDICINE

## 2017-06-28 PROCEDURE — 78452 HT MUSCLE IMAGE SPECT MULT: CPT

## 2017-06-28 PROCEDURE — 0 TECHNETIUM TETROFOSMIN KIT: Performed by: INTERNAL MEDICINE

## 2017-06-28 PROCEDURE — 93018 CV STRESS TEST I&R ONLY: CPT | Performed by: INTERNAL MEDICINE

## 2017-06-28 RX ADMIN — TETROFOSMIN 1 DOSE: 1.38 INJECTION, POWDER, LYOPHILIZED, FOR SOLUTION INTRAVENOUS at 13:05

## 2017-06-28 RX ADMIN — TETROFOSMIN 1 DOSE: 1.38 INJECTION, POWDER, LYOPHILIZED, FOR SOLUTION INTRAVENOUS at 12:02

## 2017-08-19 DIAGNOSIS — E03.9 ACQUIRED HYPOTHYROIDISM: ICD-10-CM

## 2017-08-19 RX ORDER — LEVOTHYROXINE SODIUM 175 UG/1
TABLET ORAL
Qty: 90 TABLET | Refills: 0 | Status: CANCELLED | OUTPATIENT
Start: 2017-08-19

## 2017-08-22 DIAGNOSIS — E03.9 ACQUIRED HYPOTHYROIDISM: ICD-10-CM

## 2017-08-22 RX ORDER — LEVOTHYROXINE SODIUM 175 UG/1
175 TABLET ORAL DAILY
Qty: 30 TABLET | Refills: 0 | Status: SHIPPED | OUTPATIENT
Start: 2017-08-22 | End: 2017-08-30 | Stop reason: SDUPTHER

## 2017-08-30 ENCOUNTER — OFFICE VISIT (OUTPATIENT)
Dept: FAMILY MEDICINE CLINIC | Facility: CLINIC | Age: 77
End: 2017-08-30

## 2017-08-30 VITALS
DIASTOLIC BLOOD PRESSURE: 88 MMHG | WEIGHT: 174 LBS | HEART RATE: 80 BPM | TEMPERATURE: 97.4 F | HEIGHT: 70 IN | SYSTOLIC BLOOD PRESSURE: 134 MMHG | BODY MASS INDEX: 24.91 KG/M2 | RESPIRATION RATE: 16 BRPM

## 2017-08-30 DIAGNOSIS — E03.9 ACQUIRED HYPOTHYROIDISM: ICD-10-CM

## 2017-08-30 DIAGNOSIS — Z86.73 HISTORY OF CVA (CEREBROVASCULAR ACCIDENT): ICD-10-CM

## 2017-08-30 DIAGNOSIS — I10 ESSENTIAL HYPERTENSION: Primary | ICD-10-CM

## 2017-08-30 PROCEDURE — 99214 OFFICE O/P EST MOD 30 MIN: CPT | Performed by: FAMILY MEDICINE

## 2017-08-30 RX ORDER — IRBESARTAN 75 MG/1
75 TABLET ORAL NIGHTLY
Qty: 90 TABLET | Refills: 1 | Status: SHIPPED | OUTPATIENT
Start: 2017-08-30 | End: 2017-08-30 | Stop reason: ALTCHOICE

## 2017-08-30 RX ORDER — LEVOTHYROXINE SODIUM 175 UG/1
175 TABLET ORAL DAILY
Qty: 90 TABLET | Refills: 1 | Status: SHIPPED | OUTPATIENT
Start: 2017-08-30 | End: 2018-02-19 | Stop reason: SDUPTHER

## 2017-08-30 RX ORDER — VALSARTAN 40 MG/1
40 TABLET ORAL NIGHTLY
Qty: 90 TABLET | Refills: 1 | Status: SHIPPED | OUTPATIENT
Start: 2017-08-30 | End: 2018-02-19 | Stop reason: SDUPTHER

## 2017-08-30 RX ORDER — ENZALUTAMIDE 40 MG/1
CAPSULE ORAL 2 TIMES DAILY
COMMUNITY
Start: 2017-08-07 | End: 2019-05-04

## 2017-08-30 RX ORDER — CLOPIDOGREL BISULFATE 75 MG/1
75 TABLET ORAL DAILY
Qty: 90 TABLET | Refills: 1 | Status: SHIPPED | OUTPATIENT
Start: 2017-08-30 | End: 2018-02-19 | Stop reason: SDUPTHER

## 2017-08-30 NOTE — PROGRESS NOTES
"Chief Complaint   Patient presents with   • Hypertension   • Hypothyroidism       Subjective   This patient presents the office to refill medicines.  His blood pressure is controlled. He has not been taking his irbesartan regularly due to dizziness.  Thyroid is to goal with current dose of levothyroxine.  He continues to take clopidogrel for his history of cerebrovascular accident.  No medication side effects are reported.    He continues to be under the care of his oncologist for history of malignant neoplasm of the prostate.  His new chemotherapy agent is well-tolerated.  Labs from the specialists have been reviewed and are within normal limits.  I have reviewed and updated his medications, medical history and problem list during today's office visit.        Social History   Substance Use Topics   • Smoking status: Former Smoker     Types: Cigars   • Smokeless tobacco: Never Used      Comment: cigar   • Alcohol use No      Comment: caffeine use 1 cup daily       Review of Systems   Constitutional: Negative for fatigue.   Cardiovascular: Negative for chest pain.       Objective   /88  Pulse 80  Temp 97.4 °F (36.3 °C) (Oral)   Resp 16  Ht 70\" (177.8 cm)  Wt 174 lb (78.9 kg)  BMI 24.97 kg/m2  Body mass index is 24.97 kg/(m^2).  Physical Exam   Constitutional: He is cooperative. No distress.   Eyes: Conjunctivae and lids are normal.   Neck: Carotid bruit is not present. No tracheal deviation present.   Cardiovascular: Normal rate, regular rhythm and normal heart sounds.    No murmur heard.  Pulmonary/Chest: Effort normal and breath sounds normal.   Neurological: He is alert. He is not disoriented.   Skin: Skin is warm and dry.   Psychiatric: He has a normal mood and affect. His speech is normal and behavior is normal.   Vitals reviewed.      Data Reviewed:    Labs reviewed.     Assessment/Plan     Problem List Items Addressed This Visit        Cardiovascular and Mediastinum    Essential hypertension - " Primary    Relevant Medications    valsartan (DIOVAN) 40 MG tablet       Endocrine    Acquired hypothyroidism    Relevant Medications    levothyroxine (SYNTHROID, LEVOTHROID) 175 MCG tablet       Other    History of CVA (cerebrovascular accident)    Relevant Medications    clopidogrel (PLAVIX) 75 MG tablet          Outpatient Encounter Prescriptions as of 8/30/2017   Medication Sig Dispense Refill   • Ascorbic Acid (VITAMIN C PO) Take 1 tablet by mouth Daily.     • levothyroxine (SYNTHROID, LEVOTHROID) 175 MCG tablet Take 1 tablet by mouth Daily for 180 days. 90 tablet 1   • MULTIPLE MINERALS-VITAMINS PO Take 1 tablet by mouth.     • XTANDI 40 MG chemo capsule 2 (Two) Times a Day.     • [DISCONTINUED] irbesartan (AVAPRO) 75 MG tablet Take 75 mg by mouth Every Night.     • [DISCONTINUED] irbesartan (AVAPRO) 75 MG tablet Take 1 tablet by mouth Every Night for 180 days. 90 tablet 1   • [DISCONTINUED] levothyroxine (SYNTHROID, LEVOTHROID) 175 MCG tablet Take 1 tablet by mouth Daily for 180 days. 30 tablet 0   • clopidogrel (PLAVIX) 75 MG tablet Take 1 tablet by mouth Daily for 180 days. 90 tablet 1   • valsartan (DIOVAN) 40 MG tablet Take 1 tablet by mouth Every Night for 180 days. 90 tablet 1   • [DISCONTINUED] clopidogrel (PLAVIX) 75 MG tablet Take 1 tablet by mouth Daily for 180 days. 90 tablet 1     No facility-administered encounter medications on file as of 8/30/2017.        No orders of the defined types were placed in this encounter.      Continue with current treatment plan.         F/U in 6 weeks to recheck BP.

## 2017-10-11 ENCOUNTER — OFFICE VISIT (OUTPATIENT)
Dept: FAMILY MEDICINE CLINIC | Facility: CLINIC | Age: 77
End: 2017-10-11

## 2017-10-11 VITALS
HEIGHT: 70 IN | HEART RATE: 65 BPM | DIASTOLIC BLOOD PRESSURE: 71 MMHG | WEIGHT: 176 LBS | RESPIRATION RATE: 16 BRPM | TEMPERATURE: 96.9 F | BODY MASS INDEX: 25.2 KG/M2 | SYSTOLIC BLOOD PRESSURE: 110 MMHG

## 2017-10-11 DIAGNOSIS — Z23 IMMUNIZATION DUE: ICD-10-CM

## 2017-10-11 DIAGNOSIS — I10 ESSENTIAL HYPERTENSION: Primary | ICD-10-CM

## 2017-10-11 PROCEDURE — G0008 ADMIN INFLUENZA VIRUS VAC: HCPCS | Performed by: FAMILY MEDICINE

## 2017-10-11 PROCEDURE — 99212 OFFICE O/P EST SF 10 MIN: CPT | Performed by: FAMILY MEDICINE

## 2017-10-11 PROCEDURE — 90662 IIV NO PRSV INCREASED AG IM: CPT | Performed by: FAMILY MEDICINE

## 2017-10-11 NOTE — PROGRESS NOTES
"Chief Complaint   Patient presents with   • Hypertension       Crissy Guevara presents to the office today to recheck his bp. No medication side effects are reported.  Home Blood pressures have been reviewed and are well controlled.    I have reviewed and updated his medications, medical history and problem list during today's office visit.        Social History   Substance Use Topics   • Smoking status: Former Smoker     Types: Cigars   • Smokeless tobacco: Never Used      Comment: cigar   • Alcohol use No      Comment: caffeine use 1 cup daily       Review of Systems   Neurological: Negative for dizziness.       Objective   /71  Pulse 65  Temp 96.9 °F (36.1 °C) (Oral)   Resp 16  Ht 70\" (177.8 cm)  Wt 176 lb (79.8 kg)  BMI 25.25 kg/m2  Body mass index is 25.25 kg/(m^2).  Physical Exam   Constitutional: He is cooperative. No distress.   Eyes: Conjunctivae and lids are normal.   Neck: Carotid bruit is not present. No tracheal deviation present.   Cardiovascular: Normal rate, regular rhythm and normal heart sounds.    No murmur heard.  Pulmonary/Chest: Effort normal and breath sounds normal.   Neurological: He is alert. He is not disoriented.   Skin: Skin is warm and dry.   Psychiatric: He has a normal mood and affect. His speech is normal and behavior is normal.   Vitals reviewed.      Data Reviewed:        Assessment/Plan     Problem List Items Addressed This Visit        Cardiovascular and Mediastinum    Essential hypertension - Primary      Other Visit Diagnoses     Immunization due        Relevant Orders    Flu Vaccine High Dose PF 65YR+          Outpatient Encounter Prescriptions as of 10/11/2017   Medication Sig Dispense Refill   • Ascorbic Acid (VITAMIN C PO) Take 1 tablet by mouth Daily.     • clopidogrel (PLAVIX) 75 MG tablet Take 1 tablet by mouth Daily for 180 days. 90 tablet 1   • levothyroxine (SYNTHROID, LEVOTHROID) 175 MCG tablet Take 1 tablet by mouth Daily for 180 days. 90 tablet 1 "   • MULTIPLE MINERALS-VITAMINS PO Take 1 tablet by mouth.     • valsartan (DIOVAN) 40 MG tablet Take 1 tablet by mouth Every Night for 180 days. 90 tablet 1   • XTANDI 40 MG chemo capsule 2 (Two) Times a Day.       No facility-administered encounter medications on file as of 10/11/2017.        Orders Placed This Encounter   Procedures   • Flu Vaccine High Dose PF 65YR+       Continue with current treatment plan.         F/U in 4 months

## 2018-01-17 ENCOUNTER — OFFICE VISIT (OUTPATIENT)
Dept: FAMILY MEDICINE CLINIC | Facility: CLINIC | Age: 78
End: 2018-01-17

## 2018-01-17 VITALS
TEMPERATURE: 97.8 F | WEIGHT: 182 LBS | BODY MASS INDEX: 26.05 KG/M2 | HEIGHT: 70 IN | RESPIRATION RATE: 18 BRPM | HEART RATE: 72 BPM | SYSTOLIC BLOOD PRESSURE: 118 MMHG | OXYGEN SATURATION: 98 % | DIASTOLIC BLOOD PRESSURE: 69 MMHG

## 2018-01-17 DIAGNOSIS — J06.9 ACUTE URI: Primary | ICD-10-CM

## 2018-01-17 PROCEDURE — 99212 OFFICE O/P EST SF 10 MIN: CPT | Performed by: NURSE PRACTITIONER

## 2018-01-17 NOTE — PROGRESS NOTES
Subjective   Ismael Ball is a 77 y.o. male.     History of Present Illness   Ismael Ball 77 y.o. male who presents for evaluation of upper respiratory congestion. Symptoms include ear pressure, congestion, sore throat, productive cough and fatigue.  Onset of symptoms was 3 days ago, gradually worsening since that time. Patient denies shortness of breath, wheezing, fever.   Evaluation to date: none Treatment to date:  OTC cough suppressant and Tylenol.     The following portions of the patient's history were reviewed and updated as appropriate: allergies, current medications, past family history, past medical history, past social history, past surgical history and problem list.    Review of Systems   Constitutional: Negative for chills and fever.   HENT: Positive for congestion, postnasal drip and sore throat. Negative for ear discharge, ear pain, rhinorrhea, sinus pain and sinus pressure.    Respiratory: Positive for cough. Negative for chest tightness, shortness of breath and wheezing.        Objective   Physical Exam   Constitutional: He is oriented to person, place, and time. He appears well-developed and well-nourished.   HENT:   Right Ear: Tympanic membrane, external ear and ear canal normal.   Left Ear: Tympanic membrane, external ear and ear canal normal.   Nose: Nose normal.   Mouth/Throat: Uvula is midline and oropharynx is clear and moist.   Cardiovascular: Normal rate and regular rhythm.    Pulmonary/Chest: Effort normal and breath sounds normal.   Neurological: He is oriented to person, place, and time.   Skin: Skin is warm and dry.   Psychiatric: He has a normal mood and affect. His behavior is normal. Judgment and thought content normal.   Nursing note and vitals reviewed.      Assessment/Plan   Ismael was seen today for uri.    Diagnoses and all orders for this visit:    Acute URI             patient to contact office Friday if worsens or no improvement.

## 2018-01-23 ENCOUNTER — TELEPHONE (OUTPATIENT)
Dept: FAMILY MEDICINE CLINIC | Facility: CLINIC | Age: 78
End: 2018-01-23

## 2018-01-23 RX ORDER — AZITHROMYCIN 250 MG/1
TABLET, FILM COATED ORAL
Qty: 6 TABLET | Refills: 0 | Status: SHIPPED | OUTPATIENT
Start: 2018-01-23 | End: 2018-02-19

## 2018-02-16 ENCOUNTER — TELEPHONE (OUTPATIENT)
Dept: FAMILY MEDICINE CLINIC | Facility: CLINIC | Age: 78
End: 2018-02-16

## 2018-02-16 DIAGNOSIS — E78.2 MIXED HYPERLIPIDEMIA: ICD-10-CM

## 2018-02-16 DIAGNOSIS — E03.9 ACQUIRED HYPOTHYROIDISM: ICD-10-CM

## 2018-02-16 DIAGNOSIS — I10 ESSENTIAL HYPERTENSION: Primary | ICD-10-CM

## 2018-02-16 LAB
ALBUMIN SERPL-MCNC: 4 G/DL (ref 3.5–5.2)
ALBUMIN/GLOB SERPL: 1.5 G/DL
ALP SERPL-CCNC: 74 U/L (ref 39–117)
ALT SERPL-CCNC: 14 U/L (ref 1–41)
AST SERPL-CCNC: 12 U/L (ref 1–40)
BASOPHILS # BLD MANUAL: 0.13 10*3/MM3 (ref 0–0.2)
BASOPHILS NFR BLD MANUAL: 2 % (ref 0–1.5)
BILIRUB SERPL-MCNC: 0.8 MG/DL (ref 0.1–1.2)
BUN SERPL-MCNC: 18 MG/DL (ref 8–23)
BUN/CREAT SERPL: 18.9 (ref 7–25)
CALCIUM SERPL-MCNC: 10.1 MG/DL (ref 8.6–10.5)
CHLORIDE SERPL-SCNC: 100 MMOL/L (ref 98–107)
CHOLEST SERPL-MCNC: 281 MG/DL (ref 0–200)
CO2 SERPL-SCNC: 27.8 MMOL/L (ref 22–29)
CREAT SERPL-MCNC: 0.95 MG/DL (ref 0.76–1.27)
DIFFERENTIAL COMMENT: ABNORMAL
EOSINOPHIL # BLD MANUAL: 0.33 10*3/MM3 (ref 0–0.7)
EOSINOPHIL NFR BLD MANUAL: 5 % (ref 0.3–6.2)
ERYTHROCYTE [DISTWIDTH] IN BLOOD BY AUTOMATED COUNT: 14.2 % (ref 11.5–14.5)
GFR SERPLBLD CREATININE-BSD FMLA CKD-EPI: 77 ML/MIN/1.73
GFR SERPLBLD CREATININE-BSD FMLA CKD-EPI: 93 ML/MIN/1.73
GLOBULIN SER CALC-MCNC: 2.7 GM/DL
GLUCOSE SERPL-MCNC: 106 MG/DL (ref 65–99)
HCT VFR BLD AUTO: 44.2 % (ref 40.4–52.2)
HDLC SERPL-MCNC: 58 MG/DL (ref 40–60)
HGB BLD-MCNC: 14.6 G/DL (ref 13.7–17.6)
LDLC SERPL CALC-MCNC: 182 MG/DL (ref 0–100)
LDLC/HDLC SERPL: 3.14 {RATIO}
LYMPHOCYTES # BLD MANUAL: 2.69 10*3/MM3 (ref 0.9–4.8)
LYMPHOCYTES NFR BLD MANUAL: 41 % (ref 19.6–45.3)
MCH RBC QN AUTO: 30.8 PG (ref 27–32.7)
MCHC RBC AUTO-ENTMCNC: 33 G/DL (ref 32.6–36.4)
MCV RBC AUTO: 93.2 FL (ref 79.8–96.2)
MONOCYTES # BLD MANUAL: 0.33 10*3/MM3 (ref 0.2–1.2)
MONOCYTES NFR BLD MANUAL: 5 % (ref 5–12)
NEUTROPHILS # BLD MANUAL: 3.09 10*3/MM3 (ref 1.9–8.1)
NEUTROPHILS NFR BLD MANUAL: 47 % (ref 42.7–76)
PLATELET # BLD AUTO: 255 10*3/MM3 (ref 140–500)
PLATELET BLD QL SMEAR: ABNORMAL
POTASSIUM SERPL-SCNC: 5.2 MMOL/L (ref 3.5–5.2)
PROT SERPL-MCNC: 6.7 G/DL (ref 6–8.5)
RBC # BLD AUTO: 4.74 10*6/MM3 (ref 4.6–6)
RBC MORPH BLD: ABNORMAL
SODIUM SERPL-SCNC: 142 MMOL/L (ref 136–145)
T4 FREE SERPL-MCNC: 1.55 NG/DL (ref 0.93–1.7)
TRIGL SERPL-MCNC: 203 MG/DL (ref 0–150)
TSH SERPL DL<=0.005 MIU/L-ACNC: 14.54 MIU/ML (ref 0.27–4.2)
VLDLC SERPL CALC-MCNC: 40.6 MG/DL (ref 5–40)
WBC # BLD AUTO: 6.57 10*3/MM3 (ref 4.5–10.7)

## 2018-02-19 ENCOUNTER — OFFICE VISIT (OUTPATIENT)
Dept: FAMILY MEDICINE CLINIC | Facility: CLINIC | Age: 78
End: 2018-02-19

## 2018-02-19 VITALS
WEIGHT: 181 LBS | HEART RATE: 65 BPM | DIASTOLIC BLOOD PRESSURE: 73 MMHG | BODY MASS INDEX: 25.91 KG/M2 | SYSTOLIC BLOOD PRESSURE: 111 MMHG | RESPIRATION RATE: 16 BRPM | HEIGHT: 70 IN | TEMPERATURE: 97 F

## 2018-02-19 DIAGNOSIS — E03.9 ACQUIRED HYPOTHYROIDISM: ICD-10-CM

## 2018-02-19 DIAGNOSIS — Z86.73 HISTORY OF CVA (CEREBROVASCULAR ACCIDENT): ICD-10-CM

## 2018-02-19 DIAGNOSIS — I10 ESSENTIAL HYPERTENSION: Primary | ICD-10-CM

## 2018-02-19 DIAGNOSIS — E78.2 MIXED HYPERLIPIDEMIA: ICD-10-CM

## 2018-02-19 PROCEDURE — 99214 OFFICE O/P EST MOD 30 MIN: CPT | Performed by: FAMILY MEDICINE

## 2018-02-19 RX ORDER — LEVOTHYROXINE SODIUM 175 UG/1
175 TABLET ORAL DAILY
Qty: 90 TABLET | Refills: 1 | Status: SHIPPED | OUTPATIENT
Start: 2018-02-19 | End: 2018-03-04 | Stop reason: SDUPTHER

## 2018-02-19 RX ORDER — VALSARTAN 40 MG/1
40 TABLET ORAL NIGHTLY
Qty: 90 TABLET | Refills: 1 | Status: SHIPPED | OUTPATIENT
Start: 2018-02-19 | End: 2018-08-15 | Stop reason: ALTCHOICE

## 2018-02-19 RX ORDER — CLOPIDOGREL BISULFATE 75 MG/1
75 TABLET ORAL DAILY
Qty: 90 TABLET | Refills: 1 | Status: SHIPPED | OUTPATIENT
Start: 2018-02-19 | End: 2018-05-15 | Stop reason: SINTOL

## 2018-02-19 NOTE — PROGRESS NOTES
"Chief Complaint   Patient presents with   • Hypertension   • Hypothyroidism       Subjective   Ismael Ball presents to the office today to refill his medications and review recent labs. No medication side effects are reported.  Blood pressure is controlled.  Lipids are not fully controlled.  He had his statin medication stopped last year due to biliary obstruction.  He has mild elevation of fasting blood sugar.  His TSH level is elevated to 14.  He does have some fatigue.  We will repeat labs and if still elevated will increase his dose of levothyroxine.    I have reviewed and updated his medications, medical history and problem list during today's office visit.     Assessment/Plan   Problem List Items Addressed This Visit        Cardiovascular and Mediastinum    Essential hypertension - Primary    Relevant Medications    valsartan (DIOVAN) 40 MG tablet    Mixed hyperlipidemia       Endocrine    Acquired hypothyroidism    Relevant Medications    levothyroxine (SYNTHROID, LEVOTHROID) 175 MCG tablet    Other Relevant Orders    T4, Free    TSH       Other    History of CVA (cerebrovascular accident)    Relevant Medications    clopidogrel (PLAVIX) 75 MG tablet        F/U in 6 months       Review of Systems   Constitutional: Positive for fatigue.   Cardiovascular: Negative for chest pain.     Objective   /73  Pulse 65  Temp 97 °F (36.1 °C) (Oral)   Resp 16  Ht 177.8 cm (70\")  Wt 82.1 kg (181 lb)  BMI 25.97 kg/m2  Body mass index is 25.97 kg/(m^2).  Physical Exam   Constitutional: He is cooperative. No distress.   Eyes: Conjunctivae and lids are normal.   Neck: Trachea normal and phonation normal. Carotid bruit is not present. No tracheal deviation present. No thyroid mass and no thyromegaly present.   Cardiovascular: Normal rate, regular rhythm and normal heart sounds.    No murmur heard.  Pulmonary/Chest: Effort normal and breath sounds normal.   Neurological: He is alert. He is not disoriented.   Skin: " Skin is warm and dry.   Psychiatric: He has a normal mood and affect. His speech is normal and behavior is normal.   Vitals reviewed.       Social History   Substance Use Topics   • Smoking status: Former Smoker     Types: Cigars   • Smokeless tobacco: Never Used      Comment: cigar   • Alcohol use No      Comment: caffeine use 1 cup daily       Data Reviewed:    CMP:  Lab Results   Component Value Date     (H) 02/16/2018    BUN 18 02/16/2018    CREATININE 0.95 02/16/2018    EGFRIFNONA 77 02/16/2018    EGFRIFAFRI 93 02/16/2018     02/16/2018    K 5.2 02/16/2018     02/16/2018    CALCIUM 10.1 02/16/2018    PROTENTOTREF 6.7 02/16/2018    ALBUMIN 4.00 02/16/2018    LABGLOBREF 2.7 02/16/2018    BILITOT 0.8 02/16/2018    ALKPHOS 74 02/16/2018    AST 12 02/16/2018    ALT 14 02/16/2018     CBC w/ diff:   Lab Results   Component Value Date    WBC 6.57 02/16/2018    RBC 4.74 02/16/2018    HGB 14.6 02/16/2018    HCT 44.2 02/16/2018    MCV 93.2 02/16/2018    MCH 30.8 02/16/2018    MCHC 33.0 02/16/2018    RDW 14.2 02/16/2018     02/16/2018    NEUTRORELPCT 56.0 03/28/2017    AUTOIGPER 0.4 03/28/2017    LYMPHORELPCT 36.3 03/28/2017    MONORELPCT 4.6 (L) 03/28/2017    EOSRELPCT 2.6 03/28/2017    BASORELPCT 0.1 03/28/2017     LIPID PANEL:  Lab Results   Component Value Date    CHLPL 281 (H) 02/16/2018    TRIG 203 (H) 02/16/2018    HDL 58 02/16/2018    VLDL 40.6 (H) 02/16/2018     (H) 02/16/2018    LDLHDL 3.14 02/16/2018     TSH:  Lab Results   Component Value Date    TSH 14.540 (H) 02/16/2018       Orders Placed This Encounter   Procedures   • T4, Free   • TSH       Outpatient Encounter Prescriptions as of 2/19/2018   Medication Sig Dispense Refill   • clopidogrel (PLAVIX) 75 MG tablet Take 1 tablet by mouth Daily for 180 days. 90 tablet 1   • levothyroxine (SYNTHROID, LEVOTHROID) 175 MCG tablet Take 1 tablet by mouth Daily for 180 days. 90 tablet 1   • valsartan (DIOVAN) 40 MG tablet Take 1 tablet  by mouth Every Night for 180 days. 90 tablet 1   • XTANDI 40 MG chemo capsule 2 (Two) Times a Day.     • [DISCONTINUED] Ascorbic Acid (VITAMIN C PO) Take 1 tablet by mouth Daily.     • [DISCONTINUED] clopidogrel (PLAVIX) 75 MG tablet Take 1 tablet by mouth Daily for 180 days. 90 tablet 1   • [DISCONTINUED] levothyroxine (SYNTHROID, LEVOTHROID) 175 MCG tablet Take 1 tablet by mouth Daily for 180 days. 90 tablet 1   • [DISCONTINUED] MULTIPLE MINERALS-VITAMINS PO Take 1 tablet by mouth.     • [DISCONTINUED] valsartan (DIOVAN) 40 MG tablet Take 1 tablet by mouth Every Night for 180 days. 90 tablet 1   • [DISCONTINUED] azithromycin (ZITHROMAX Z-ASIA) 250 MG tablet Take 2 tablets the first day, then 1 tablet daily for 4 days. 6 tablet 0     No facility-administered encounter medications on file as of 2/19/2018.

## 2018-02-28 LAB
T4 FREE SERPL-MCNC: 1.22 NG/DL (ref 0.93–1.7)
TSH SERPL DL<=0.005 MIU/L-ACNC: 15.36 MIU/ML (ref 0.27–4.2)

## 2018-03-04 RX ORDER — LEVOTHYROXINE SODIUM 0.2 MG/1
200 TABLET ORAL DAILY
Qty: 90 TABLET | Refills: 0 | Status: SHIPPED | OUTPATIENT
Start: 2018-03-04 | End: 2018-05-30 | Stop reason: SDUPTHER

## 2018-04-04 ENCOUNTER — RESULTS ENCOUNTER (OUTPATIENT)
Dept: FAMILY MEDICINE CLINIC | Facility: CLINIC | Age: 78
End: 2018-04-04

## 2018-04-04 DIAGNOSIS — E03.9 ACQUIRED HYPOTHYROIDISM: ICD-10-CM

## 2018-04-16 ENCOUNTER — TELEPHONE (OUTPATIENT)
Dept: FAMILY MEDICINE CLINIC | Facility: CLINIC | Age: 78
End: 2018-04-16

## 2018-05-30 DIAGNOSIS — E03.9 ACQUIRED HYPOTHYROIDISM: ICD-10-CM

## 2018-05-31 RX ORDER — LEVOTHYROXINE SODIUM 0.2 MG/1
TABLET ORAL
Qty: 90 TABLET | Refills: 0 | Status: SHIPPED | OUTPATIENT
Start: 2018-05-31 | End: 2018-08-15 | Stop reason: SDUPTHER

## 2018-07-30 ENCOUNTER — APPOINTMENT (OUTPATIENT)
Dept: GENERAL RADIOLOGY | Facility: HOSPITAL | Age: 78
End: 2018-07-30

## 2018-07-30 ENCOUNTER — HOSPITAL ENCOUNTER (EMERGENCY)
Facility: HOSPITAL | Age: 78
Discharge: HOME | End: 2018-07-30
Attending: EMERGENCY MEDICINE | Admitting: EMERGENCY MEDICINE

## 2018-07-30 VITALS
RESPIRATION RATE: 18 BRPM | BODY MASS INDEX: 26.63 KG/M2 | HEIGHT: 70 IN | OXYGEN SATURATION: 95 % | SYSTOLIC BLOOD PRESSURE: 115 MMHG | WEIGHT: 186 LBS | HEART RATE: 73 BPM | TEMPERATURE: 98.9 F | DIASTOLIC BLOOD PRESSURE: 79 MMHG

## 2018-07-30 DIAGNOSIS — R53.83 FATIGUE, UNSPECIFIED TYPE: ICD-10-CM

## 2018-07-30 DIAGNOSIS — D64.89 ANEMIA DUE TO OTHER CAUSE, NOT CLASSIFIED: Primary | ICD-10-CM

## 2018-07-30 LAB
ALBUMIN SERPL-MCNC: 3.6 G/DL (ref 3.5–5.2)
ALBUMIN/GLOB SERPL: 1.2 G/DL
ALP SERPL-CCNC: 113 U/L (ref 39–117)
ALT SERPL W P-5'-P-CCNC: 57 U/L (ref 1–41)
ANION GAP SERPL CALCULATED.3IONS-SCNC: 13.3 MMOL/L
AST SERPL-CCNC: 59 U/L (ref 1–40)
BACTERIA UR QL AUTO: ABNORMAL /HPF
BASOPHILS # BLD AUTO: 0.01 10*3/MM3 (ref 0–0.2)
BASOPHILS NFR BLD AUTO: 0.1 % (ref 0–1.5)
BILIRUB SERPL-MCNC: 1.2 MG/DL (ref 0.1–1.2)
BILIRUB UR QL STRIP: NEGATIVE
BUN BLD-MCNC: 24 MG/DL (ref 8–23)
BUN/CREAT SERPL: 26.4 (ref 7–25)
CALCIUM SPEC-SCNC: 9.6 MG/DL (ref 8.6–10.5)
CHLORIDE SERPL-SCNC: 100 MMOL/L (ref 98–107)
CLARITY UR: CLEAR
CO2 SERPL-SCNC: 24.7 MMOL/L (ref 22–29)
COLOR UR: ABNORMAL
CREAT BLD-MCNC: 0.91 MG/DL (ref 0.76–1.27)
DEPRECATED RDW RBC AUTO: 49.9 FL (ref 37–54)
EOSINOPHIL # BLD AUTO: 0.01 10*3/MM3 (ref 0–0.7)
EOSINOPHIL NFR BLD AUTO: 0.1 % (ref 0.3–6.2)
ERYTHROCYTE [DISTWIDTH] IN BLOOD BY AUTOMATED COUNT: 14.9 % (ref 11.5–14.5)
GFR SERPL CREATININE-BSD FRML MDRD: 81 ML/MIN/1.73
GLOBULIN UR ELPH-MCNC: 3.1 GM/DL
GLUCOSE BLD-MCNC: 118 MG/DL (ref 65–99)
GLUCOSE UR STRIP-MCNC: NEGATIVE MG/DL
HCT VFR BLD AUTO: 40 % (ref 40.4–52.2)
HGB BLD-MCNC: 13.2 G/DL (ref 13.7–17.6)
HGB UR QL STRIP.AUTO: NEGATIVE
HYALINE CASTS UR QL AUTO: ABNORMAL /LPF
IMM GRANULOCYTES # BLD: 0.03 10*3/MM3 (ref 0–0.03)
IMM GRANULOCYTES NFR BLD: 0.3 % (ref 0–0.5)
KETONES UR QL STRIP: ABNORMAL
LEUKOCYTE ESTERASE UR QL STRIP.AUTO: ABNORMAL
LYMPHOCYTES # BLD AUTO: 1.13 10*3/MM3 (ref 0.9–4.8)
LYMPHOCYTES NFR BLD AUTO: 11.3 % (ref 19.6–45.3)
MCH RBC QN AUTO: 30.2 PG (ref 27–32.7)
MCHC RBC AUTO-ENTMCNC: 33 G/DL (ref 32.6–36.4)
MCV RBC AUTO: 91.5 FL (ref 79.8–96.2)
MONOCYTES # BLD AUTO: 0.37 10*3/MM3 (ref 0.2–1.2)
MONOCYTES NFR BLD AUTO: 3.7 % (ref 5–12)
NEUTROPHILS # BLD AUTO: 8.44 10*3/MM3 (ref 1.9–8.1)
NEUTROPHILS NFR BLD AUTO: 84.8 % (ref 42.7–76)
NITRITE UR QL STRIP: NEGATIVE
PH UR STRIP.AUTO: <=5 [PH] (ref 5–8)
PLATELET # BLD AUTO: 195 10*3/MM3 (ref 140–500)
PMV BLD AUTO: 9.3 FL (ref 6–12)
POTASSIUM BLD-SCNC: 5.1 MMOL/L (ref 3.5–5.2)
PROT SERPL-MCNC: 6.7 G/DL (ref 6–8.5)
PROT UR QL STRIP: ABNORMAL
RBC # BLD AUTO: 4.37 10*6/MM3 (ref 4.6–6)
RBC # UR: ABNORMAL /HPF
REF LAB TEST METHOD: ABNORMAL
SODIUM BLD-SCNC: 138 MMOL/L (ref 136–145)
SP GR UR STRIP: 1.02 (ref 1–1.03)
SQUAMOUS #/AREA URNS HPF: ABNORMAL /HPF
TROPONIN T SERPL-MCNC: <0.01 NG/ML (ref 0–0.03)
UROBILINOGEN UR QL STRIP: ABNORMAL
WBC NRBC COR # BLD: 9.96 10*3/MM3 (ref 4.5–10.7)
WBC UR QL AUTO: ABNORMAL /HPF

## 2018-07-30 PROCEDURE — 81001 URINALYSIS AUTO W/SCOPE: CPT | Performed by: EMERGENCY MEDICINE

## 2018-07-30 PROCEDURE — 85025 COMPLETE CBC W/AUTO DIFF WBC: CPT | Performed by: EMERGENCY MEDICINE

## 2018-07-30 PROCEDURE — 71046 X-RAY EXAM CHEST 2 VIEWS: CPT

## 2018-07-30 PROCEDURE — 80053 COMPREHEN METABOLIC PANEL: CPT | Performed by: EMERGENCY MEDICINE

## 2018-07-30 PROCEDURE — 84484 ASSAY OF TROPONIN QUANT: CPT | Performed by: EMERGENCY MEDICINE

## 2018-07-30 PROCEDURE — 93005 ELECTROCARDIOGRAM TRACING: CPT | Performed by: EMERGENCY MEDICINE

## 2018-07-30 PROCEDURE — 99284 EMERGENCY DEPT VISIT MOD MDM: CPT

## 2018-07-30 PROCEDURE — 93010 ELECTROCARDIOGRAM REPORT: CPT | Performed by: INTERNAL MEDICINE

## 2018-07-30 RX ORDER — SODIUM CHLORIDE 0.9 % (FLUSH) 0.9 %
10 SYRINGE (ML) INJECTION AS NEEDED
Status: DISCONTINUED | OUTPATIENT
Start: 2018-07-30 | End: 2018-07-30 | Stop reason: HOSPADM

## 2018-08-01 ENCOUNTER — TELEPHONE (OUTPATIENT)
Dept: SOCIAL WORK | Facility: HOSPITAL | Age: 78
End: 2018-08-01

## 2018-08-15 ENCOUNTER — OFFICE VISIT (OUTPATIENT)
Dept: FAMILY MEDICINE CLINIC | Facility: CLINIC | Age: 78
End: 2018-08-15

## 2018-08-15 VITALS
HEIGHT: 70 IN | BODY MASS INDEX: 25.91 KG/M2 | TEMPERATURE: 97.4 F | DIASTOLIC BLOOD PRESSURE: 78 MMHG | HEART RATE: 63 BPM | RESPIRATION RATE: 16 BRPM | WEIGHT: 181 LBS | SYSTOLIC BLOOD PRESSURE: 129 MMHG

## 2018-08-15 DIAGNOSIS — E03.9 ACQUIRED HYPOTHYROIDISM: ICD-10-CM

## 2018-08-15 DIAGNOSIS — Z86.73 HISTORY OF CVA (CEREBROVASCULAR ACCIDENT): ICD-10-CM

## 2018-08-15 DIAGNOSIS — I10 ESSENTIAL HYPERTENSION: Primary | ICD-10-CM

## 2018-08-15 DIAGNOSIS — C79.51 MALIGNANT NEOPLASM METASTATIC TO BONE (HCC): ICD-10-CM

## 2018-08-15 DIAGNOSIS — E78.2 MIXED HYPERLIPIDEMIA: ICD-10-CM

## 2018-08-15 PROCEDURE — 99214 OFFICE O/P EST MOD 30 MIN: CPT | Performed by: FAMILY MEDICINE

## 2018-08-15 RX ORDER — LOSARTAN POTASSIUM 50 MG/1
50 TABLET ORAL DAILY
Qty: 90 TABLET | Refills: 1 | Status: SHIPPED | OUTPATIENT
Start: 2018-08-15 | End: 2019-02-13 | Stop reason: SDUPTHER

## 2018-08-15 RX ORDER — PREDNISONE 1 MG/1
TABLET ORAL
Refills: 1 | COMMUNITY
Start: 2018-08-08 | End: 2019-05-04

## 2018-08-15 RX ORDER — IPRATROPIUM BROMIDE 42 UG/1
SPRAY, METERED NASAL
COMMUNITY
Start: 2018-05-15 | End: 2019-05-04 | Stop reason: ALTCHOICE

## 2018-08-15 RX ORDER — LEVOTHYROXINE SODIUM 0.2 MG/1
200 TABLET ORAL DAILY
Qty: 90 TABLET | Refills: 1 | Status: SHIPPED | OUTPATIENT
Start: 2018-08-15 | End: 2019-02-13 | Stop reason: SDUPTHER

## 2018-08-15 NOTE — PROGRESS NOTES
"Chief Complaint   Patient presents with   • Hypertension       Subjective     Ismael Ball presents to the office today to refill his medications. No medication side effects are reported.  His thyroid is stable pending lab results.  Blood pressure is controlled.  He is on valsartan and we will switch due to FDA recall.  Lipids are stable.  He is statin intolerant.  He has history of CVA.  He was on clopidogrel but that caused dizziness.  He has been off of that medication for 3 months.  He will start with small 81 mg aspirin daily to prevent further CVA problems. 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  Treatment is effective and he is due to see his oncologist tomorrow. Current medication has side effects from his bone pain. He has been off the medication now for 5 weeks and he may have to restart the medication tomorrow.   I have reviewed and updated his medications, medical history and problem list during today's office visit.     Patient Care Team:  Pierre Olson MD as PCP - General (Family Medicine)  Master Corcoran MD as Consulting Physician (Medical Oncology)  Oscar Evangelista MD as Consulting Physician (Gastroenterology)    Social History   Substance Use Topics   • Smoking status: Former Smoker     Types: Cigars   • Smokeless tobacco: Never Used      Comment: cigar   • Alcohol use No      Comment: caffeine use 1 cup daily       Review of Systems   Constitutional: Negative for fatigue.   Cardiovascular: Negative for chest pain.   Musculoskeletal:        Bone pain in hips and both shoulders       Objective     /78   Pulse 63   Temp 97.4 °F (36.3 °C) (Oral)   Resp 16   Ht 177.8 cm (70\")   Wt 82.1 kg (181 lb)   BMI 25.97 kg/m²     Body mass index is 25.97 kg/m².    Physical Exam   Constitutional: He is oriented to person, place, and time. He appears well-developed. No distress.   Eyes: Conjunctivae " and lids are normal.   Neck: Carotid bruit is not present.   Cardiovascular: Normal rate, regular rhythm and normal heart sounds.    Pulmonary/Chest: Effort normal and breath sounds normal.   Neurological: He is alert and oriented to person, place, and time.   Skin: Skin is warm and dry.   Psychiatric: He has a normal mood and affect. His behavior is normal.   Vitals reviewed.      Data Reviewed:    Xr Chest 2 View    Result Date: 7/30/2018  Impression: No findings of acute cardiopulmonary pathology.  This report was finalized on 7/30/2018 11:15 AM by Dr. Kevin Alas M.D.      Left hip arthritis, metastatic bony disease noted on recent xray from [facility]      Assessment/Plan     Problem List Items Addressed This Visit     Acquired hypothyroidism    Relevant Medications    predniSONE (DELTASONE) 5 MG tablet    levothyroxine (SYNTHROID, LEVOTHROID) 200 MCG tablet    Other Relevant Orders    TSH    Essential hypertension - Primary    Relevant Medications    losartan (COZAAR) 50 MG tablet    Other Relevant Orders    Comprehensive metabolic panel    CBC and Differential    Mixed hyperlipidemia    Relevant Orders    Lipid Panel With LDL/HDL Ratio    History of CVA (cerebrovascular accident)    Relevant Medications    aspirin 81 MG tablet    Malignant neoplasm metastatic to bone (CMS/HCC)          Orders Placed This Encounter   Procedures   • Comprehensive metabolic panel     Standing Status:   Future     Standing Expiration Date:   5/12/2019   • Lipid Panel With LDL/HDL Ratio     Standing Status:   Future     Standing Expiration Date:   5/12/2019   • TSH     Standing Status:   Future     Standing Expiration Date:   5/12/2019   • CBC and Differential     Standing Status:   Future     Standing Expiration Date:   5/12/2019     Order Specific Question:   Manual Differential     Answer:   No         Current Outpatient Prescriptions:   •  INCRUSE ELLIPTA 62.5 MCG/INH aerosol powder , INL 1 PUFF PO QD., Disp: , Rfl: 3  •   ipratropium (ATROVENT) 0.06 % nasal spray, INT 2 SPRAYS INTO EACH NOSTRIL TID PRN, Disp: , Rfl:   •  levothyroxine (SYNTHROID, LEVOTHROID) 200 MCG tablet, Take 1 tablet by mouth Daily for 180 days., Disp: 90 tablet, Rfl: 1  •  predniSONE (DELTASONE) 5 MG tablet, TK 3 TS PO QD, Disp: , Rfl: 1  •  XTANDI 40 MG chemo capsule, 2 (Two) Times a Day., Disp: , Rfl:   •  aspirin 81 MG tablet, Take 1 tablet by mouth Daily for 30 days., Disp: 30 tablet, Rfl: 0  •  losartan (COZAAR) 50 MG tablet, Take 1 tablet by mouth Daily for 180 days., Disp: 90 tablet, Rfl: 1    Return in about 6 months (around 2/15/2019) for Recheck.

## 2018-08-16 LAB
T4 FREE SERPL-MCNC: 1.53 NG/DL (ref 0.93–1.7)
TSH SERPL DL<=0.005 MIU/L-ACNC: 6.5 MIU/ML (ref 0.27–4.2)

## 2018-08-30 DIAGNOSIS — Z86.73 HISTORY OF CVA (CEREBROVASCULAR ACCIDENT): ICD-10-CM

## 2018-08-30 RX ORDER — CLOPIDOGREL BISULFATE 75 MG/1
TABLET ORAL
Qty: 90 TABLET | Refills: 0 | OUTPATIENT
Start: 2018-08-30

## 2019-01-12 ENCOUNTER — RESULTS ENCOUNTER (OUTPATIENT)
Dept: FAMILY MEDICINE CLINIC | Facility: CLINIC | Age: 79
End: 2019-01-12

## 2019-01-12 DIAGNOSIS — E03.9 ACQUIRED HYPOTHYROIDISM: ICD-10-CM

## 2019-01-12 DIAGNOSIS — E78.2 MIXED HYPERLIPIDEMIA: ICD-10-CM

## 2019-01-12 DIAGNOSIS — I10 ESSENTIAL HYPERTENSION: ICD-10-CM

## 2019-02-08 DIAGNOSIS — E03.9 ACQUIRED HYPOTHYROIDISM: ICD-10-CM

## 2019-02-11 RX ORDER — LEVOTHYROXINE SODIUM 0.2 MG/1
TABLET ORAL
Qty: 90 TABLET | Refills: 0 | OUTPATIENT
Start: 2019-02-11

## 2019-02-13 DIAGNOSIS — I10 ESSENTIAL HYPERTENSION: ICD-10-CM

## 2019-02-13 DIAGNOSIS — E03.9 ACQUIRED HYPOTHYROIDISM: ICD-10-CM

## 2019-02-13 RX ORDER — LOSARTAN POTASSIUM 50 MG/1
50 TABLET ORAL DAILY
Qty: 30 TABLET | Refills: 0 | Status: SHIPPED | OUTPATIENT
Start: 2019-02-13 | End: 2019-02-27 | Stop reason: ALTCHOICE

## 2019-02-13 RX ORDER — LOSARTAN POTASSIUM 50 MG/1
TABLET ORAL
Qty: 90 TABLET | Refills: 0 | OUTPATIENT
Start: 2019-02-13

## 2019-02-13 RX ORDER — LEVOTHYROXINE SODIUM 0.2 MG/1
200 TABLET ORAL DAILY
Qty: 30 TABLET | Refills: 0 | Status: SHIPPED | OUTPATIENT
Start: 2019-02-13 | End: 2019-02-27 | Stop reason: SDUPTHER

## 2019-02-22 LAB
ALBUMIN SERPL-MCNC: 3.9 G/DL (ref 3.5–5.2)
ALBUMIN/GLOB SERPL: 1.3 G/DL
ALP SERPL-CCNC: 132 U/L (ref 39–117)
ALT SERPL-CCNC: 12 U/L (ref 1–41)
AST SERPL-CCNC: 12 U/L (ref 1–40)
BASOPHILS # BLD AUTO: 0.04 10*3/MM3 (ref 0–0.2)
BASOPHILS NFR BLD AUTO: 0.7 % (ref 0–1.5)
BILIRUB SERPL-MCNC: 0.5 MG/DL (ref 0.1–1.2)
BUN SERPL-MCNC: 17 MG/DL (ref 8–23)
BUN/CREAT SERPL: 21.3 (ref 7–25)
CALCIUM SERPL-MCNC: 9.9 MG/DL (ref 8.6–10.5)
CHLORIDE SERPL-SCNC: 101 MMOL/L (ref 98–107)
CHOLEST SERPL-MCNC: 249 MG/DL (ref 0–200)
CO2 SERPL-SCNC: 28.5 MMOL/L (ref 22–29)
CREAT SERPL-MCNC: 0.8 MG/DL (ref 0.76–1.27)
EOSINOPHIL # BLD AUTO: 0.23 10*3/MM3 (ref 0–0.4)
EOSINOPHIL NFR BLD AUTO: 3.7 % (ref 0.3–6.2)
ERYTHROCYTE [DISTWIDTH] IN BLOOD BY AUTOMATED COUNT: 14.6 % (ref 12.3–15.4)
GLOBULIN SER CALC-MCNC: 2.9 GM/DL
GLUCOSE SERPL-MCNC: 104 MG/DL (ref 65–99)
HCT VFR BLD AUTO: 43 % (ref 37.5–51)
HDLC SERPL-MCNC: 53 MG/DL (ref 40–60)
HGB BLD-MCNC: 13.4 G/DL (ref 13–17.7)
IMM GRANULOCYTES # BLD AUTO: 0.01 10*3/MM3 (ref 0–0.05)
IMM GRANULOCYTES NFR BLD AUTO: 0.2 % (ref 0–0.5)
LDLC SERPL CALC-MCNC: 163 MG/DL (ref 0–100)
LDLC/HDLC SERPL: 3.08 {RATIO}
LYMPHOCYTES # BLD AUTO: 2.41 10*3/MM3 (ref 0.7–3.1)
LYMPHOCYTES NFR BLD AUTO: 39.2 % (ref 19.6–45.3)
MCH RBC QN AUTO: 30 PG (ref 26.6–33)
MCHC RBC AUTO-ENTMCNC: 31.2 G/DL (ref 31.5–35.7)
MCV RBC AUTO: 96.4 FL (ref 79–97)
MONOCYTES # BLD AUTO: 0.32 10*3/MM3 (ref 0.1–0.9)
MONOCYTES NFR BLD AUTO: 5.2 % (ref 5–12)
NEUTROPHILS # BLD AUTO: 3.14 10*3/MM3 (ref 1.4–7)
NEUTROPHILS NFR BLD AUTO: 51 % (ref 42.7–76)
NRBC BLD AUTO-RTO: 0.3 /100 WBC (ref 0–0)
PLATELET # BLD AUTO: 280 10*3/MM3 (ref 140–450)
POTASSIUM SERPL-SCNC: 5.2 MMOL/L (ref 3.5–5.2)
PROT SERPL-MCNC: 6.8 G/DL (ref 6–8.5)
RBC # BLD AUTO: 4.46 10*6/MM3 (ref 4.14–5.8)
SODIUM SERPL-SCNC: 142 MMOL/L (ref 136–145)
TRIGL SERPL-MCNC: 163 MG/DL (ref 0–150)
TSH SERPL DL<=0.005 MIU/L-ACNC: 3.3 MIU/ML (ref 0.27–4.2)
VLDLC SERPL CALC-MCNC: 32.6 MG/DL (ref 5–40)
WBC # BLD AUTO: 6.15 10*3/MM3 (ref 3.4–10.8)

## 2019-02-27 ENCOUNTER — OFFICE VISIT (OUTPATIENT)
Dept: FAMILY MEDICINE CLINIC | Facility: CLINIC | Age: 79
End: 2019-02-27

## 2019-02-27 VITALS
SYSTOLIC BLOOD PRESSURE: 89 MMHG | HEART RATE: 77 BPM | TEMPERATURE: 97.3 F | RESPIRATION RATE: 16 BRPM | DIASTOLIC BLOOD PRESSURE: 60 MMHG | WEIGHT: 184 LBS | BODY MASS INDEX: 26.34 KG/M2 | HEIGHT: 70 IN

## 2019-02-27 DIAGNOSIS — R06.02 SHORTNESS OF BREATH: ICD-10-CM

## 2019-02-27 DIAGNOSIS — E78.2 MIXED HYPERLIPIDEMIA: ICD-10-CM

## 2019-02-27 DIAGNOSIS — I10 ESSENTIAL HYPERTENSION: Primary | ICD-10-CM

## 2019-02-27 DIAGNOSIS — E03.9 ACQUIRED HYPOTHYROIDISM: ICD-10-CM

## 2019-02-27 PROCEDURE — 99214 OFFICE O/P EST MOD 30 MIN: CPT | Performed by: FAMILY MEDICINE

## 2019-02-27 RX ORDER — LEVOTHYROXINE SODIUM 0.2 MG/1
200 TABLET ORAL DAILY
Qty: 90 TABLET | Refills: 1 | Status: SHIPPED | OUTPATIENT
Start: 2019-02-27 | End: 2019-08-07 | Stop reason: SDUPTHER

## 2019-02-27 RX ORDER — CHOLESTYRAMINE LIGHT 4 G/5.7G
4 POWDER, FOR SUSPENSION ORAL DAILY
Qty: 90 PACKET | Refills: 1 | Status: ON HOLD | OUTPATIENT
Start: 2019-02-27 | End: 2019-04-24

## 2019-02-27 RX ORDER — TELMISARTAN 20 MG/1
20 TABLET ORAL NIGHTLY
Qty: 90 TABLET | Refills: 1 | Status: SHIPPED | OUTPATIENT
Start: 2019-02-27 | End: 2019-07-01

## 2019-02-27 NOTE — PROGRESS NOTES
"Chief Complaint   Patient presents with   • Hypertension   • Hypothyroidism       Subjective     Ismael Ball presents to the office today to refill his medications and review recent labs. No medication side effects are reported.  Thyroid is stable on current therapy.  Blood pressure is very well controlled.  Unfortunately his medication needs to be changed.  We will switched to telmisartan.  He continues to have shortness of breath.  Workup from cardiology did not reveal a cause.  The shortness of breath seem to correlate with his chemotherapy treatments.  We will get referral to pulmonologist to further evaluate this problem.    I have reviewed and updated his medications, medical history and problem list during today's office visit.      Patient Care Team:  Pierre Olson MD as PCP - General (Family Medicine)  Master Corcoran MD as Consulting Physician (Medical Oncology)  Oscar Evangelista MD as Consulting Physician (Gastroenterology)    Social History     Tobacco Use   • Smoking status: Former Smoker     Types: Cigars   • Smokeless tobacco: Never Used   • Tobacco comment: cigar   Substance Use Topics   • Alcohol use: No     Comment: caffeine use 1 cup daily       Review of Systems   Constitutional: Negative for fatigue.   Respiratory: Positive for shortness of breath.    Cardiovascular: Negative for chest pain.       Objective     BP (!) 89/60   Pulse 77   Temp 97.3 °F (36.3 °C) (Oral)   Resp 16   Ht 177.8 cm (70\")   Wt 83.5 kg (184 lb)   BMI 26.40 kg/m²     Body mass index is 26.4 kg/m².    Physical Exam   Constitutional: He is oriented to person, place, and time. He appears well-developed. No distress.   Eyes: Conjunctivae and lids are normal.   Neck: Carotid bruit is not present.   Cardiovascular: Normal rate, regular rhythm and normal heart sounds.   Pulmonary/Chest: Effort normal and breath sounds normal.   Neurological: He is alert and oriented to person, place, and time.   Skin: Skin is " warm and dry.   Psychiatric: He has a normal mood and affect. His behavior is normal.   Vitals reviewed.      Data Reviewed:             Lab Results   Component Value Date     (H) 02/22/2019    BUN 17 02/22/2019    BUN 24 (H) 07/30/2018    CREATININE 0.80 02/22/2019    CREATININE 0.8 09/12/2018    EGFRIFNONA 93 02/22/2019    EGFRIFNONA 81 07/30/2018    EGFRIFAFRI 113 02/22/2019     02/22/2019     07/30/2018    K 5.2 02/22/2019    K 5.1 07/30/2018     02/22/2019     07/30/2018    CO2 28.5 02/22/2019    CO2 24.7 07/30/2018    CALCIUM 9.9 02/22/2019    CALCIUM 9.6 07/30/2018    ALBUMIN 3.90 02/22/2019    ALBUMIN 3.60 07/30/2018    LABGLOBREF 2.9 02/22/2019    BILITOT 0.5 02/22/2019    BILITOT 1.2 07/30/2018    ALKPHOS 132 (H) 02/22/2019    ALKPHOS 113 07/30/2018    AST 12 02/22/2019    AST 59 (H) 07/30/2018    ALT 12 02/22/2019    ALT 57 (H) 07/30/2018    CHLPL 249 (H) 02/22/2019    TRIG 163 (H) 02/22/2019    HDL 53 02/22/2019    VLDL 32.6 02/22/2019     (H) 02/22/2019    LDLHDL 3.08 02/22/2019    WBC 6.15 02/22/2019    RBC 4.46 02/22/2019    HCT 43.0 02/22/2019    HCT 40.0 (L) 07/30/2018    MCV 96.4 02/22/2019    MCV 91.5 07/30/2018    MCH 30.0 02/22/2019    MCH 30.2 07/30/2018    MCHC 31.2 (L) 02/22/2019    MCHC 33.0 07/30/2018    RDW 14.6 02/22/2019    RDW 14.9 (H) 07/30/2018    TSH 3.300 02/22/2019    PSA 51.37 (H) 02/19/2016          Assessment/Plan     Problem List Items Addressed This Visit     Acquired hypothyroidism     The current medical regimen is effective;  continue present plan and medications.           Relevant Medications    levothyroxine (SYNTHROID, LEVOTHROID) 200 MCG tablet    Essential hypertension - Primary     Hypertension is unchanged.  Medication changes per orders. Change to Telmisartan 20 mg daily due to recall of Losartan.     Blood pressure will be reassessed in 2 months.         Relevant Medications    telmisartan (MICARDIS) 20 MG tablet    Mixed  hyperlipidemia     Lipid abnormalities are unchanged.  Pharmacotherapy as ordered.  Will add cholestyramine powder 1 packet daily to 8 ounces of water daily.  Lipids will be reassessed in 6 months.         Relevant Medications    cholestyramine light 4 g packet    Shortness of breath     This is an ongoing problem.  Referral to pulmonology to complete workup.         Relevant Orders    Ambulatory Referral to Pulmonology          Orders Placed This Encounter   Procedures   • Ambulatory Referral to Pulmonology     Referral Priority:   Routine     Referral Type:   Consultation     Referral Reason:   Specialty Services Required     Referred to Provider:   Anjum Reza MD     Requested Specialty:   Pulmonary Disease     Number of Visits Requested:   1         Current Outpatient Medications:   •  Cholecalciferol (VITAMIN D PO), Take  by mouth. Take 2 capsules daily, Disp: , Rfl:   •  INCRUSE ELLIPTA 62.5 MCG/INH aerosol powder , INL 1 PUFF PO QD., Disp: , Rfl: 3  •  ipratropium (ATROVENT) 0.06 % nasal spray, INT 2 SPRAYS INTO EACH NOSTRIL TID PRN, Disp: , Rfl:   •  levothyroxine (SYNTHROID, LEVOTHROID) 200 MCG tablet, Take 1 tablet by mouth Daily for 180 days., Disp: 90 tablet, Rfl: 1  •  predniSONE (DELTASONE) 5 MG tablet, TK 3 TS PO QD, Disp: , Rfl: 1  •  XTANDI 40 MG chemo capsule, 2 (Two) Times a Day., Disp: , Rfl:   •  aspirin 81 MG tablet, Take 1 tablet by mouth Daily for 30 days., Disp: 30 tablet, Rfl: 0  •  cholestyramine light 4 g packet, Take 1 packet by mouth Daily for 180 days., Disp: 90 packet, Rfl: 1  •  telmisartan (MICARDIS) 20 MG tablet, Take 1 tablet by mouth Every Night for 180 days., Disp: 90 tablet, Rfl: 1    Return in about 2 months (around 4/27/2019) for BP Check.

## 2019-02-27 NOTE — ASSESSMENT & PLAN NOTE
Lipid abnormalities are unchanged.  Pharmacotherapy as ordered.  Will add cholestyramine powder 1 packet daily to 8 ounces of water daily.  Lipids will be reassessed in 6 months.

## 2019-02-27 NOTE — ASSESSMENT & PLAN NOTE
Hypertension is unchanged.  Medication changes per orders. Change to Telmisartan 20 mg daily due to recall of Losartan.     Blood pressure will be reassessed in 2 months.

## 2019-03-26 ENCOUNTER — TRANSCRIBE ORDERS (OUTPATIENT)
Dept: ADMINISTRATIVE | Facility: HOSPITAL | Age: 79
End: 2019-03-26

## 2019-03-26 DIAGNOSIS — J84.9 ILD (INTERSTITIAL LUNG DISEASE) (HCC): Primary | ICD-10-CM

## 2019-04-01 DIAGNOSIS — E03.9 ACQUIRED HYPOTHYROIDISM: ICD-10-CM

## 2019-04-02 RX ORDER — LEVOTHYROXINE SODIUM 0.2 MG/1
TABLET ORAL
Qty: 90 TABLET | Refills: 0 | OUTPATIENT
Start: 2019-04-02

## 2019-04-17 ENCOUNTER — HOSPITAL ENCOUNTER (OUTPATIENT)
Dept: CT IMAGING | Facility: HOSPITAL | Age: 79
Discharge: HOME | End: 2019-04-17
Admitting: INTERNAL MEDICINE

## 2019-04-17 DIAGNOSIS — J84.9 ILD (INTERSTITIAL LUNG DISEASE) (HCC): ICD-10-CM

## 2019-04-17 PROCEDURE — 71250 CT THORAX DX C-: CPT

## 2019-04-24 ENCOUNTER — HOSPITAL ENCOUNTER (INPATIENT)
Facility: HOSPITAL | Age: 79
LOS: 2 days | Discharge: HOME | End: 2019-04-26
Attending: EMERGENCY MEDICINE | Admitting: HOSPITALIST

## 2019-04-24 ENCOUNTER — APPOINTMENT (OUTPATIENT)
Dept: CT IMAGING | Facility: HOSPITAL | Age: 79
End: 2019-04-24

## 2019-04-24 DIAGNOSIS — K83.1 COMMON BILE DUCT (CBD) OBSTRUCTION: ICD-10-CM

## 2019-04-24 DIAGNOSIS — N30.90 CYSTITIS: Primary | ICD-10-CM

## 2019-04-24 PROBLEM — R79.89 ELEVATED LIVER FUNCTION TESTS: Status: ACTIVE | Noted: 2019-04-24

## 2019-04-24 PROBLEM — T85.590A BILIARY STENT OBSTRUCTION: Status: ACTIVE | Noted: 2019-04-24

## 2019-04-24 LAB
ALBUMIN SERPL-MCNC: 3.5 G/DL (ref 3.5–5.2)
ALBUMIN SERPL-MCNC: 3.8 G/DL (ref 3.5–5.2)
ALBUMIN/GLOB SERPL: 1.2 G/DL
ALBUMIN/GLOB SERPL: 1.4 G/DL
ALP SERPL-CCNC: 325 U/L (ref 39–117)
ALP SERPL-CCNC: 353 U/L (ref 39–117)
ALT SERPL W P-5'-P-CCNC: 515 U/L (ref 1–41)
ALT SERPL W P-5'-P-CCNC: 553 U/L (ref 1–41)
ANION GAP SERPL CALCULATED.3IONS-SCNC: 10.4 MMOL/L
ANION GAP SERPL CALCULATED.3IONS-SCNC: 12.9 MMOL/L
ANISOCYTOSIS BLD QL: ABNORMAL
AST SERPL-CCNC: 365 U/L (ref 1–40)
AST SERPL-CCNC: 512 U/L (ref 1–40)
BACTERIA UR QL AUTO: ABNORMAL /HPF
BILIRUB SERPL-MCNC: 3.7 MG/DL (ref 0.2–1.2)
BILIRUB SERPL-MCNC: 4.1 MG/DL (ref 0.2–1.2)
BILIRUB UR QL STRIP: ABNORMAL
BUN BLD-MCNC: 14 MG/DL (ref 8–23)
BUN BLD-MCNC: 16 MG/DL (ref 8–23)
BUN/CREAT SERPL: 15.2 (ref 7–25)
BUN/CREAT SERPL: 15.8 (ref 7–25)
CALCIUM SPEC-SCNC: 8.2 MG/DL (ref 8.6–10.5)
CALCIUM SPEC-SCNC: 8.5 MG/DL (ref 8.6–10.5)
CHLORIDE SERPL-SCNC: 102 MMOL/L (ref 98–107)
CHLORIDE SERPL-SCNC: 98 MMOL/L (ref 98–107)
CLARITY UR: CLEAR
CO2 SERPL-SCNC: 26.1 MMOL/L (ref 22–29)
CO2 SERPL-SCNC: 26.6 MMOL/L (ref 22–29)
COLOR UR: ABNORMAL
CREAT BLD-MCNC: 0.92 MG/DL (ref 0.76–1.27)
CREAT BLD-MCNC: 1.01 MG/DL (ref 0.76–1.27)
D-LACTATE SERPL-SCNC: 1.6 MMOL/L (ref 0.5–2)
DEPRECATED RDW RBC AUTO: 50 FL (ref 37–54)
ERYTHROCYTE [DISTWIDTH] IN BLOOD BY AUTOMATED COUNT: 15 % (ref 12.3–15.4)
GFR SERPL CREATININE-BSD FRML MDRD: 71 ML/MIN/1.73
GFR SERPL CREATININE-BSD FRML MDRD: 80 ML/MIN/1.73
GLOBULIN UR ELPH-MCNC: 2.8 GM/DL
GLOBULIN UR ELPH-MCNC: 2.9 GM/DL
GLUCOSE BLD-MCNC: 131 MG/DL (ref 65–99)
GLUCOSE BLD-MCNC: 143 MG/DL (ref 65–99)
GLUCOSE UR STRIP-MCNC: NEGATIVE MG/DL
HCT VFR BLD AUTO: 41 % (ref 37.5–51)
HGB BLD-MCNC: 13.2 G/DL (ref 13–17.7)
HGB UR QL STRIP.AUTO: NEGATIVE
HYALINE CASTS UR QL AUTO: ABNORMAL /LPF
KETONES UR QL STRIP: NEGATIVE
LEUKOCYTE ESTERASE UR QL STRIP.AUTO: ABNORMAL
LIPASE SERPL-CCNC: 20 U/L (ref 13–60)
LYMPHOCYTES # BLD MANUAL: 0.42 10*3/MM3 (ref 0.7–3.1)
LYMPHOCYTES NFR BLD MANUAL: 3 % (ref 5–12)
LYMPHOCYTES NFR BLD MANUAL: 8 % (ref 19.6–45.3)
MCH RBC QN AUTO: 29.5 PG (ref 26.6–33)
MCHC RBC AUTO-ENTMCNC: 32.2 G/DL (ref 31.5–35.7)
MCV RBC AUTO: 91.5 FL (ref 79–97)
MONOCYTES # BLD AUTO: 0.16 10*3/MM3 (ref 0.1–0.9)
NEUTROPHILS # BLD AUTO: 4.65 10*3/MM3 (ref 1.7–7)
NEUTROPHILS NFR BLD MANUAL: 89 % (ref 42.7–76)
NITRITE UR QL STRIP: POSITIVE
NRBC BLD AUTO-RTO: 0.2 /100 WBC (ref 0–0.2)
PH UR STRIP.AUTO: 7.5 [PH] (ref 5–8)
PLAT MORPH BLD: NORMAL
PLATELET # BLD AUTO: 255 10*3/MM3 (ref 140–450)
PMV BLD AUTO: 9.9 FL (ref 6–12)
POTASSIUM BLD-SCNC: 4.6 MMOL/L (ref 3.5–5.2)
POTASSIUM BLD-SCNC: 4.7 MMOL/L (ref 3.5–5.2)
PROT SERPL-MCNC: 6.4 G/DL (ref 6–8.5)
PROT SERPL-MCNC: 6.6 G/DL (ref 6–8.5)
PROT UR QL STRIP: ABNORMAL
RBC # BLD AUTO: 4.48 10*6/MM3 (ref 4.14–5.8)
RBC # UR: ABNORMAL /HPF
REF LAB TEST METHOD: ABNORMAL
SODIUM BLD-SCNC: 137 MMOL/L (ref 136–145)
SODIUM BLD-SCNC: 139 MMOL/L (ref 136–145)
SP GR UR STRIP: 1.02 (ref 1–1.03)
SQUAMOUS #/AREA URNS HPF: ABNORMAL /HPF
UROBILINOGEN UR QL STRIP: ABNORMAL
WBC MORPH BLD: NORMAL
WBC NRBC COR # BLD: 5.23 10*3/MM3 (ref 3.4–10.8)
WBC UR QL AUTO: ABNORMAL /HPF

## 2019-04-24 PROCEDURE — 74177 CT ABD & PELVIS W/CONTRAST: CPT

## 2019-04-24 PROCEDURE — 87040 BLOOD CULTURE FOR BACTERIA: CPT | Performed by: EMERGENCY MEDICINE

## 2019-04-24 PROCEDURE — 94799 UNLISTED PULMONARY SVC/PX: CPT

## 2019-04-24 PROCEDURE — 81001 URINALYSIS AUTO W/SCOPE: CPT | Performed by: EMERGENCY MEDICINE

## 2019-04-24 PROCEDURE — 83605 ASSAY OF LACTIC ACID: CPT | Performed by: EMERGENCY MEDICINE

## 2019-04-24 PROCEDURE — 87150 DNA/RNA AMPLIFIED PROBE: CPT | Performed by: EMERGENCY MEDICINE

## 2019-04-24 PROCEDURE — 87086 URINE CULTURE/COLONY COUNT: CPT | Performed by: EMERGENCY MEDICINE

## 2019-04-24 PROCEDURE — 99284 EMERGENCY DEPT VISIT MOD MDM: CPT

## 2019-04-24 PROCEDURE — 94640 AIRWAY INHALATION TREATMENT: CPT

## 2019-04-24 PROCEDURE — 80053 COMPREHEN METABOLIC PANEL: CPT | Performed by: EMERGENCY MEDICINE

## 2019-04-24 PROCEDURE — 25010000002 CEFEPIME PER 500 MG: Performed by: INTERNAL MEDICINE

## 2019-04-24 PROCEDURE — 25010000002 CEFTRIAXONE PER 250 MG: Performed by: EMERGENCY MEDICINE

## 2019-04-24 PROCEDURE — 83690 ASSAY OF LIPASE: CPT | Performed by: EMERGENCY MEDICINE

## 2019-04-24 PROCEDURE — 85025 COMPLETE CBC W/AUTO DIFF WBC: CPT | Performed by: EMERGENCY MEDICINE

## 2019-04-24 PROCEDURE — 87186 SC STD MICRODIL/AGAR DIL: CPT | Performed by: EMERGENCY MEDICINE

## 2019-04-24 PROCEDURE — 80053 COMPREHEN METABOLIC PANEL: CPT | Performed by: NURSE PRACTITIONER

## 2019-04-24 PROCEDURE — 25010000002 IOPAMIDOL 61 % SOLUTION: Performed by: EMERGENCY MEDICINE

## 2019-04-24 PROCEDURE — 85007 BL SMEAR W/DIFF WBC COUNT: CPT | Performed by: EMERGENCY MEDICINE

## 2019-04-24 RX ORDER — SODIUM CHLORIDE 0.9 % (FLUSH) 0.9 %
3-10 SYRINGE (ML) INJECTION AS NEEDED
Status: DISCONTINUED | OUTPATIENT
Start: 2019-04-24 | End: 2019-04-26 | Stop reason: HOSPADM

## 2019-04-24 RX ORDER — ONDANSETRON 4 MG/1
4 TABLET, FILM COATED ORAL EVERY 6 HOURS PRN
Status: DISCONTINUED | OUTPATIENT
Start: 2019-04-24 | End: 2019-04-26 | Stop reason: HOSPADM

## 2019-04-24 RX ORDER — SODIUM CHLORIDE 9 MG/ML
100 INJECTION, SOLUTION INTRAVENOUS CONTINUOUS
Status: DISCONTINUED | OUTPATIENT
Start: 2019-04-24 | End: 2019-04-26 | Stop reason: HOSPADM

## 2019-04-24 RX ORDER — CEFTRIAXONE SODIUM 1 G/50ML
1 INJECTION, SOLUTION INTRAVENOUS ONCE
Status: COMPLETED | OUTPATIENT
Start: 2019-04-24 | End: 2019-04-24

## 2019-04-24 RX ORDER — SODIUM CHLORIDE 0.9 % (FLUSH) 0.9 %
10 SYRINGE (ML) INJECTION AS NEEDED
Status: DISCONTINUED | OUTPATIENT
Start: 2019-04-24 | End: 2019-04-26 | Stop reason: HOSPADM

## 2019-04-24 RX ORDER — ACETAMINOPHEN 325 MG/1
650 TABLET ORAL EVERY 4 HOURS PRN
Status: DISCONTINUED | OUTPATIENT
Start: 2019-04-24 | End: 2019-04-26 | Stop reason: HOSPADM

## 2019-04-24 RX ORDER — ONDANSETRON 2 MG/ML
4 INJECTION INTRAMUSCULAR; INTRAVENOUS EVERY 6 HOURS PRN
Status: DISCONTINUED | OUTPATIENT
Start: 2019-04-24 | End: 2019-04-26 | Stop reason: HOSPADM

## 2019-04-24 RX ORDER — LOSARTAN POTASSIUM 25 MG/1
25 TABLET ORAL NIGHTLY
Status: DISCONTINUED | OUTPATIENT
Start: 2019-04-24 | End: 2019-04-26 | Stop reason: HOSPADM

## 2019-04-24 RX ORDER — SODIUM CHLORIDE 0.9 % (FLUSH) 0.9 %
3 SYRINGE (ML) INJECTION EVERY 12 HOURS SCHEDULED
Status: DISCONTINUED | OUTPATIENT
Start: 2019-04-24 | End: 2019-04-26 | Stop reason: HOSPADM

## 2019-04-24 RX ORDER — LEVOTHYROXINE SODIUM 0.1 MG/1
200 TABLET ORAL DAILY
Status: DISCONTINUED | OUTPATIENT
Start: 2019-04-24 | End: 2019-04-26 | Stop reason: HOSPADM

## 2019-04-24 RX ORDER — IPRATROPIUM BROMIDE 42 UG/1
2 SPRAY, METERED NASAL 3 TIMES DAILY
Status: DISCONTINUED | OUTPATIENT
Start: 2019-04-24 | End: 2019-04-26 | Stop reason: HOSPADM

## 2019-04-24 RX ORDER — PREDNISONE 1 MG/1
5 TABLET ORAL
Status: DISCONTINUED | OUTPATIENT
Start: 2019-04-25 | End: 2019-04-26 | Stop reason: HOSPADM

## 2019-04-24 RX ORDER — NITROGLYCERIN 0.4 MG/1
0.4 TABLET SUBLINGUAL
Status: DISCONTINUED | OUTPATIENT
Start: 2019-04-24 | End: 2019-04-26 | Stop reason: HOSPADM

## 2019-04-24 RX ADMIN — LEVOTHYROXINE SODIUM 200 MCG: 100 TABLET ORAL at 22:04

## 2019-04-24 RX ADMIN — METRONIDAZOLE 500 MG: 500 INJECTION, SOLUTION INTRAVENOUS at 18:11

## 2019-04-24 RX ADMIN — LOSARTAN POTASSIUM 25 MG: 25 TABLET, FILM COATED ORAL at 22:04

## 2019-04-24 RX ADMIN — CEFEPIME HYDROCHLORIDE 2 G: 2 INJECTION, POWDER, FOR SOLUTION INTRAVENOUS at 16:55

## 2019-04-24 RX ADMIN — SODIUM CHLORIDE 100 ML/HR: 9 INJECTION, SOLUTION INTRAVENOUS at 15:56

## 2019-04-24 RX ADMIN — CEFTRIAXONE SODIUM 1 G: 1 INJECTION, SOLUTION INTRAVENOUS at 13:25

## 2019-04-24 RX ADMIN — IPRATROPIUM BROMIDE 0.5 MG: 0.5 SOLUTION RESPIRATORY (INHALATION) at 15:58

## 2019-04-24 RX ADMIN — IOPAMIDOL 85 ML: 612 INJECTION, SOLUTION INTRAVENOUS at 10:47

## 2019-04-24 RX ADMIN — IPRATROPIUM BROMIDE 2 SPRAY: 42 SPRAY NASAL at 22:05

## 2019-04-24 RX ADMIN — SODIUM CHLORIDE, PRESERVATIVE FREE 3 ML: 5 INJECTION INTRAVENOUS at 22:05

## 2019-04-24 RX ADMIN — IPRATROPIUM BROMIDE 0.5 MG: 0.5 SOLUTION RESPIRATORY (INHALATION) at 20:20

## 2019-04-24 RX ADMIN — IPRATROPIUM BROMIDE 2 SPRAY: 42 SPRAY NASAL at 16:55

## 2019-04-24 RX ADMIN — POLYETHYLENE GLYCOL 3350 17 G: 17 POWDER, FOR SOLUTION ORAL at 16:55

## 2019-04-25 PROBLEM — R78.81 BACTEREMIA DUE TO ESCHERICHIA COLI: Status: ACTIVE | Noted: 2019-04-25

## 2019-04-25 PROBLEM — B96.20 BACTEREMIA DUE TO ESCHERICHIA COLI: Status: ACTIVE | Noted: 2019-04-25

## 2019-04-25 PROBLEM — K83.09 ACUTE CHOLANGITIS: Status: ACTIVE | Noted: 2019-04-25

## 2019-04-25 LAB
ALBUMIN SERPL-MCNC: 3.1 G/DL (ref 3.5–5.2)
ALBUMIN/GLOB SERPL: 1.1 G/DL
ALP SERPL-CCNC: 276 U/L (ref 39–117)
ALT SERPL W P-5'-P-CCNC: 380 U/L (ref 1–41)
ANION GAP SERPL CALCULATED.3IONS-SCNC: 8.1 MMOL/L
AST SERPL-CCNC: 190 U/L (ref 1–40)
BACTERIA BLD CULT: ABNORMAL
BACTERIA SPEC AEROBE CULT: NO GROWTH
BASOPHILS # BLD AUTO: 0.01 10*3/MM3 (ref 0–0.2)
BASOPHILS NFR BLD AUTO: 0.2 % (ref 0–1.5)
BILIRUB SERPL-MCNC: 2.8 MG/DL (ref 0.2–1.2)
BUN BLD-MCNC: 13 MG/DL (ref 8–23)
BUN/CREAT SERPL: 17.1 (ref 7–25)
CALCIUM SPEC-SCNC: 7.7 MG/DL (ref 8.6–10.5)
CHLORIDE SERPL-SCNC: 104 MMOL/L (ref 98–107)
CO2 SERPL-SCNC: 23.9 MMOL/L (ref 22–29)
CREAT BLD-MCNC: 0.76 MG/DL (ref 0.76–1.27)
DEPRECATED RDW RBC AUTO: 53.4 FL (ref 37–54)
EOSINOPHIL # BLD AUTO: 0.15 10*3/MM3 (ref 0–0.4)
EOSINOPHIL NFR BLD AUTO: 2.8 % (ref 0.3–6.2)
ERYTHROCYTE [DISTWIDTH] IN BLOOD BY AUTOMATED COUNT: 15.1 % (ref 12.3–15.4)
GFR SERPL CREATININE-BSD FRML MDRD: 99 ML/MIN/1.73
GLOBULIN UR ELPH-MCNC: 2.8 GM/DL
GLUCOSE BLD-MCNC: 92 MG/DL (ref 65–99)
HAV IGM SERPL QL IA: NORMAL
HBV CORE IGM SERPL QL IA: NORMAL
HBV SURFACE AG SERPL QL IA: NORMAL
HCT VFR BLD AUTO: 37.6 % (ref 37.5–51)
HCV AB SER DONR QL: NORMAL
HGB BLD-MCNC: 11.7 G/DL (ref 13–17.7)
IMM GRANULOCYTES # BLD AUTO: 0.02 10*3/MM3 (ref 0–0.05)
IMM GRANULOCYTES NFR BLD AUTO: 0.4 % (ref 0–0.5)
LYMPHOCYTES # BLD AUTO: 1.85 10*3/MM3 (ref 0.7–3.1)
LYMPHOCYTES NFR BLD AUTO: 35.1 % (ref 19.6–45.3)
MCH RBC QN AUTO: 29.5 PG (ref 26.6–33)
MCHC RBC AUTO-ENTMCNC: 31.1 G/DL (ref 31.5–35.7)
MCV RBC AUTO: 94.9 FL (ref 79–97)
MONOCYTES # BLD AUTO: 0.23 10*3/MM3 (ref 0.1–0.9)
MONOCYTES NFR BLD AUTO: 4.4 % (ref 5–12)
NEUTROPHILS # BLD AUTO: 3.01 10*3/MM3 (ref 1.7–7)
NEUTROPHILS NFR BLD AUTO: 57.1 % (ref 42.7–76)
NRBC BLD AUTO-RTO: 0 /100 WBC (ref 0–0.2)
PLATELET # BLD AUTO: 207 10*3/MM3 (ref 140–450)
PMV BLD AUTO: 9.8 FL (ref 6–12)
POTASSIUM BLD-SCNC: 3.8 MMOL/L (ref 3.5–5.2)
PROT SERPL-MCNC: 5.9 G/DL (ref 6–8.5)
RBC # BLD AUTO: 3.96 10*6/MM3 (ref 4.14–5.8)
SODIUM BLD-SCNC: 136 MMOL/L (ref 136–145)
WBC NRBC COR # BLD: 5.27 10*3/MM3 (ref 3.4–10.8)

## 2019-04-25 PROCEDURE — 63710000001 PREDNISONE PER 5 MG: Performed by: NURSE PRACTITIONER

## 2019-04-25 PROCEDURE — 25010000002 CEFEPIME PER 500 MG: Performed by: INTERNAL MEDICINE

## 2019-04-25 PROCEDURE — 86645 CMV ANTIBODY IGM: CPT | Performed by: INTERNAL MEDICINE

## 2019-04-25 PROCEDURE — 25010000003 CEFTRIAXONE PER 250 MG: Performed by: INTERNAL MEDICINE

## 2019-04-25 PROCEDURE — 80053 COMPREHEN METABOLIC PANEL: CPT | Performed by: NURSE PRACTITIONER

## 2019-04-25 PROCEDURE — 86644 CMV ANTIBODY: CPT | Performed by: INTERNAL MEDICINE

## 2019-04-25 PROCEDURE — 99223 1ST HOSP IP/OBS HIGH 75: CPT | Performed by: INTERNAL MEDICINE

## 2019-04-25 PROCEDURE — 86663 EPSTEIN-BARR ANTIBODY: CPT | Performed by: INTERNAL MEDICINE

## 2019-04-25 PROCEDURE — 94799 UNLISTED PULMONARY SVC/PX: CPT

## 2019-04-25 PROCEDURE — 86664 EPSTEIN-BARR NUCLEAR ANTIGEN: CPT | Performed by: INTERNAL MEDICINE

## 2019-04-25 PROCEDURE — 86665 EPSTEIN-BARR CAPSID VCA: CPT | Performed by: INTERNAL MEDICINE

## 2019-04-25 PROCEDURE — 87040 BLOOD CULTURE FOR BACTERIA: CPT | Performed by: INTERNAL MEDICINE

## 2019-04-25 PROCEDURE — 99222 1ST HOSP IP/OBS MODERATE 55: CPT | Performed by: INTERNAL MEDICINE

## 2019-04-25 PROCEDURE — 85025 COMPLETE CBC W/AUTO DIFF WBC: CPT | Performed by: INTERNAL MEDICINE

## 2019-04-25 PROCEDURE — 86694 HERPES SIMPLEX NES ANTBDY: CPT | Performed by: INTERNAL MEDICINE

## 2019-04-25 PROCEDURE — 80074 ACUTE HEPATITIS PANEL: CPT | Performed by: INTERNAL MEDICINE

## 2019-04-25 RX ORDER — CEFTRIAXONE SODIUM 2 G/50ML
2 INJECTION, SOLUTION INTRAVENOUS EVERY 24 HOURS
Status: DISCONTINUED | OUTPATIENT
Start: 2019-04-25 | End: 2019-04-26

## 2019-04-25 RX ADMIN — METRONIDAZOLE 500 MG: 500 INJECTION, SOLUTION INTRAVENOUS at 07:57

## 2019-04-25 RX ADMIN — CEFEPIME HYDROCHLORIDE 2 G: 2 INJECTION, POWDER, FOR SOLUTION INTRAVENOUS at 09:36

## 2019-04-25 RX ADMIN — CEFEPIME HYDROCHLORIDE 1 G: 1 INJECTION, POWDER, FOR SOLUTION INTRAMUSCULAR; INTRAVENOUS at 01:24

## 2019-04-25 RX ADMIN — SODIUM CHLORIDE 100 ML/HR: 9 INJECTION, SOLUTION INTRAVENOUS at 20:55

## 2019-04-25 RX ADMIN — PREDNISONE 5 MG: 5 TABLET ORAL at 08:00

## 2019-04-25 RX ADMIN — POLYETHYLENE GLYCOL 3350 17 G: 17 POWDER, FOR SOLUTION ORAL at 11:20

## 2019-04-25 RX ADMIN — CEFTRIAXONE SODIUM 2 G: 2 INJECTION, SOLUTION INTRAVENOUS at 16:52

## 2019-04-25 RX ADMIN — IPRATROPIUM BROMIDE 0.5 MG: 0.5 SOLUTION RESPIRATORY (INHALATION) at 11:06

## 2019-04-25 RX ADMIN — LOSARTAN POTASSIUM 25 MG: 25 TABLET, FILM COATED ORAL at 20:54

## 2019-04-25 RX ADMIN — SODIUM CHLORIDE, PRESERVATIVE FREE 3 ML: 5 INJECTION INTRAVENOUS at 07:57

## 2019-04-25 RX ADMIN — IPRATROPIUM BROMIDE 0.5 MG: 0.5 SOLUTION RESPIRATORY (INHALATION) at 07:14

## 2019-04-25 RX ADMIN — METRONIDAZOLE 500 MG: 500 INJECTION, SOLUTION INTRAVENOUS at 04:10

## 2019-04-25 RX ADMIN — SODIUM CHLORIDE 100 ML/HR: 9 INJECTION, SOLUTION INTRAVENOUS at 07:57

## 2019-04-25 RX ADMIN — IPRATROPIUM BROMIDE 0.5 MG: 0.5 SOLUTION RESPIRATORY (INHALATION) at 20:57

## 2019-04-25 RX ADMIN — IPRATROPIUM BROMIDE 0.5 MG: 0.5 SOLUTION RESPIRATORY (INHALATION) at 15:02

## 2019-04-25 RX ADMIN — SODIUM CHLORIDE, PRESERVATIVE FREE 3 ML: 5 INJECTION INTRAVENOUS at 20:55

## 2019-04-25 RX ADMIN — IPRATROPIUM BROMIDE 2 SPRAY: 42 SPRAY NASAL at 16:52

## 2019-04-25 RX ADMIN — IPRATROPIUM BROMIDE 2 SPRAY: 42 SPRAY NASAL at 20:54

## 2019-04-26 VITALS
OXYGEN SATURATION: 97 % | HEIGHT: 70 IN | BODY MASS INDEX: 25.27 KG/M2 | RESPIRATION RATE: 16 BRPM | HEART RATE: 72 BPM | TEMPERATURE: 97.5 F | DIASTOLIC BLOOD PRESSURE: 86 MMHG | WEIGHT: 176.5 LBS | SYSTOLIC BLOOD PRESSURE: 125 MMHG

## 2019-04-26 LAB
ALBUMIN SERPL-MCNC: 2.9 G/DL (ref 3.5–5.2)
ALBUMIN/GLOB SERPL: 1.1 G/DL
ALP SERPL-CCNC: 263 U/L (ref 39–117)
ALT SERPL W P-5'-P-CCNC: 245 U/L (ref 1–41)
ANION GAP SERPL CALCULATED.3IONS-SCNC: 7.8 MMOL/L
AST SERPL-CCNC: 79 U/L (ref 1–40)
BACTERIA SPEC AEROBE CULT: ABNORMAL
BACTERIA SPEC AEROBE CULT: ABNORMAL
BILIRUB SERPL-MCNC: 0.9 MG/DL (ref 0.2–1.2)
BUN BLD-MCNC: 12 MG/DL (ref 8–23)
BUN/CREAT SERPL: 16.4 (ref 7–25)
CALCIUM SPEC-SCNC: 8 MG/DL (ref 8.6–10.5)
CHLORIDE SERPL-SCNC: 110 MMOL/L (ref 98–107)
CMV IGG SERPL IA-ACNC: >10 U/ML (ref 0–0.59)
CMV IGM SERPL IA-ACNC: <30 AU/ML (ref 0–29.9)
CO2 SERPL-SCNC: 23.2 MMOL/L (ref 22–29)
CREAT BLD-MCNC: 0.73 MG/DL (ref 0.76–1.27)
DEPRECATED RDW RBC AUTO: 52.4 FL (ref 37–54)
EBV EA IGG SER-ACNC: 52.4 U/ML (ref 0–8.9)
EBV NA IGG SER IA-ACNC: 170 U/ML (ref 0–17.9)
EBV VCA IGG SER-ACNC: 473 U/ML (ref 0–17.9)
ERYTHROCYTE [DISTWIDTH] IN BLOOD BY AUTOMATED COUNT: 15.3 % (ref 12.3–15.4)
GFR SERPL CREATININE-BSD FRML MDRD: 104 ML/MIN/1.73
GLOBULIN UR ELPH-MCNC: 2.7 GM/DL
GLUCOSE BLD-MCNC: 111 MG/DL (ref 65–99)
GRAM STN SPEC: ABNORMAL
HCT VFR BLD AUTO: 34.7 % (ref 37.5–51)
HGB BLD-MCNC: 10.9 G/DL (ref 13–17.7)
HSV1+2 IGM SER IA-ACNC: <0.91 RATIO (ref 0–0.9)
INTERPRETATION: ABNORMAL
ISOLATED FROM: ABNORMAL
ISOLATED FROM: ABNORMAL
MCH RBC QN AUTO: 29.5 PG (ref 26.6–33)
MCHC RBC AUTO-ENTMCNC: 31.4 G/DL (ref 31.5–35.7)
MCV RBC AUTO: 93.8 FL (ref 79–97)
PLATELET # BLD AUTO: 201 10*3/MM3 (ref 140–450)
PMV BLD AUTO: 9.9 FL (ref 6–12)
POTASSIUM BLD-SCNC: 4.1 MMOL/L (ref 3.5–5.2)
PROT SERPL-MCNC: 5.6 G/DL (ref 6–8.5)
RBC # BLD AUTO: 3.7 10*6/MM3 (ref 4.14–5.8)
SODIUM BLD-SCNC: 141 MMOL/L (ref 136–145)
WBC NRBC COR # BLD: 4.46 10*3/MM3 (ref 3.4–10.8)

## 2019-04-26 PROCEDURE — 94799 UNLISTED PULMONARY SVC/PX: CPT

## 2019-04-26 PROCEDURE — 80053 COMPREHEN METABOLIC PANEL: CPT | Performed by: NURSE PRACTITIONER

## 2019-04-26 PROCEDURE — 93010 ELECTROCARDIOGRAM REPORT: CPT | Performed by: INTERNAL MEDICINE

## 2019-04-26 PROCEDURE — 63710000001 PREDNISONE PER 5 MG: Performed by: NURSE PRACTITIONER

## 2019-04-26 PROCEDURE — 99232 SBSQ HOSP IP/OBS MODERATE 35: CPT | Performed by: INTERNAL MEDICINE

## 2019-04-26 PROCEDURE — 93005 ELECTROCARDIOGRAM TRACING: CPT | Performed by: INTERNAL MEDICINE

## 2019-04-26 PROCEDURE — 85027 COMPLETE CBC AUTOMATED: CPT | Performed by: INTERNAL MEDICINE

## 2019-04-26 RX ORDER — LEVOFLOXACIN 750 MG/1
750 TABLET ORAL EVERY 24 HOURS
Qty: 7 TABLET | Refills: 0 | Status: SHIPPED | OUTPATIENT
Start: 2019-04-27 | End: 2019-05-04

## 2019-04-26 RX ORDER — LEVOFLOXACIN 750 MG/1
750 TABLET ORAL EVERY 24 HOURS
Status: DISCONTINUED | OUTPATIENT
Start: 2019-04-26 | End: 2019-04-26 | Stop reason: HOSPADM

## 2019-04-26 RX ADMIN — IPRATROPIUM BROMIDE 2 SPRAY: 42 SPRAY NASAL at 08:09

## 2019-04-26 RX ADMIN — IPRATROPIUM BROMIDE 0.5 MG: 0.5 SOLUTION RESPIRATORY (INHALATION) at 06:54

## 2019-04-26 RX ADMIN — SODIUM CHLORIDE 100 ML/HR: 9 INJECTION, SOLUTION INTRAVENOUS at 07:34

## 2019-04-26 RX ADMIN — PREDNISONE 5 MG: 5 TABLET ORAL at 08:09

## 2019-04-26 RX ADMIN — POLYETHYLENE GLYCOL 3350 17 G: 17 POWDER, FOR SOLUTION ORAL at 08:09

## 2019-04-26 RX ADMIN — LEVOFLOXACIN 750 MG: 750 TABLET, FILM COATED ORAL at 10:01

## 2019-04-26 RX ADMIN — LEVOTHYROXINE SODIUM 200 MCG: 100 TABLET ORAL at 06:16

## 2019-04-26 RX ADMIN — SODIUM CHLORIDE, PRESERVATIVE FREE 3 ML: 5 INJECTION INTRAVENOUS at 08:09

## 2019-04-27 ENCOUNTER — READMISSION MANAGEMENT (OUTPATIENT)
Dept: CALL CENTER | Facility: HOSPITAL | Age: 79
End: 2019-04-27

## 2019-04-27 NOTE — OUTREACH NOTE
Prep Survey      Responses   Facility patient discharged from?  [facility]   Is patient eligible?  Yes   Discharge diagnosis  Biliary stent obstruction   Does the patient have one of the following disease processes/diagnoses(primary or secondary)?  Other   Does the patient have Home health ordered?  No   Is there a DME ordered?  No   Prep survey completed?  Yes          Teagan Mejia RN

## 2019-04-29 ENCOUNTER — READMISSION MANAGEMENT (OUTPATIENT)
Dept: CALL CENTER | Facility: HOSPITAL | Age: 79
End: 2019-04-29

## 2019-04-29 NOTE — OUTREACH NOTE
Medical Week 1 Survey      Responses   Facility patient discharged from?  [place]   Does the patient have one of the following disease processes/diagnoses(primary or secondary)?  Other   Is there a successful TCM telephone encounter documented?  No   Week 1 attempt successful?  Yes   Call start time  1738   Call end time  1745   Discharge diagnosis  Biliary stent obstruction   Meds reviewed with patient/caregiver?  Yes   Is the patient having any side effects they believe may be caused by any medication additions or changes?  Yes   Side effects comments   Nausea   Does the patient have all medications ordered at discharge?  Yes   Is the patient taking all medications as directed (includes completed medication regime)?  Yes   Does the patient have a primary care provider?   Yes   Does the patient have an appointment with their PCP within 7 days of discharge?  Yes   Has the patient kept scheduled appointments due by today?  N/A   Has home health visited the patient within 72 hours of discharge?  N/A   Psychosocial issues?  No   Did the patient receive a copy of their discharge instructions?  Yes   Nursing interventions  Reviewed instructions with patient   What is the patient's perception of their health status since discharge?  Improving   Is the patient/caregiver able to teach back signs and symptoms related to disease process for when to call PCP?  Yes   Is the patient/caregiver able to teach back signs and symptoms related to disease process for when to call 911?  Yes   Is the patient/caregiver able to teach back the hierarchy of who to call/visit for symptoms/problems? PCP, Specialist, Home health nurse, Urgent Care, ED, 911  Yes   Additional teach back comments  He has nausea, recommended Kefir.  He is doing some better. Procedure on hold till ABX finished.   Week 1 call completed?  Yes          Yvonne Gilbert RN

## 2019-04-30 ENCOUNTER — TELEPHONE (OUTPATIENT)
Dept: GASTROENTEROLOGY | Facility: CLINIC | Age: 79
End: 2019-04-30

## 2019-04-30 LAB
BACTERIA SPEC AEROBE CULT: NORMAL
BACTERIA SPEC AEROBE CULT: NORMAL

## 2019-04-30 NOTE — TELEPHONE ENCOUNTER
----- Message from Tierra Prakash sent at 4/30/2019  2:28 PM EDT -----  Regarding: QUESTION  Contact: [phone]  PT WAS D/C FROM HOSPITAL ON 04/26. DOES HE NEED FU VISIT WITH DR VIRK?

## 2019-05-01 ENCOUNTER — TELEPHONE (OUTPATIENT)
Dept: INFECTIOUS DISEASES | Facility: CLINIC | Age: 79
End: 2019-05-01

## 2019-05-01 NOTE — TELEPHONE ENCOUNTER
Patient wife called wondering if he can resume his cancer medication (xtandi) after he is with ABX on Friday. Please advise. Lorena Javier RN

## 2019-05-01 NOTE — TELEPHONE ENCOUNTER
That decision will be up to his oncologist; however, they usually do resume chemotherapy after the antibiotics are complete.

## 2019-05-02 ENCOUNTER — TELEPHONE (OUTPATIENT)
Dept: GASTROENTEROLOGY | Facility: CLINIC | Age: 79
End: 2019-05-02

## 2019-05-02 NOTE — TELEPHONE ENCOUNTER
Patient should probably either go to primary or urgent care to get LFTs done to see if he does have signs of obstruction or biliary abnormality.  Liver enzymes are negative would worry about it.

## 2019-05-02 NOTE — TELEPHONE ENCOUNTER
Pt's wife called back. She spoke with PMD office. They could not order CMP for pt, they need us to order.

## 2019-05-02 NOTE — TELEPHONE ENCOUNTER
Phone with patient. Informed him it is okay with Dr. Yo to resume his chemo treatment after he completes the ABX this week. He should check with his oncologist to make sure they are aware. Lorena Javier RN

## 2019-05-02 NOTE — TELEPHONE ENCOUNTER
Called pt's wife and advised of the note from Dr Evangelista. Advised MD not here tomorrow to be able to follow back up on the LFT results. She vebr understanding and will call pt's PMD to see about getting labs drawn.

## 2019-05-02 NOTE — TELEPHONE ENCOUNTER
Called pt's wife back. She states pt was in the hospital recently with an e-coli infection and was released on Levaquin. His liver enzymes were up, and he had a stent placed 2 years ago with Dr Evangelista so they called him in to take a look at it while he was there. Dr Evangelista said the stent looked good and nothing needed to be done. Pt was released from the hospital on Friday and did OK at first. He was not jaundiced while in the hospital, but yesterday he did become a little jaundiced in his face and chest. Right eye is slightly yellow, left is normal. Pt is also SOA, but that has been an issue off and on for some time now. He has a pulmonologist, Dr Reza, who told him that part of his lung is collapsed and he wants to perform bronchoscopy to go down and further investigate his lung issues. They could not get that done yet because he is on an ax. Dr Reza's office said they would call back to arrange the bronch once he is done with the abx. Pt states he is nauseated but believes it is a side effect of the abx. He has been taking the anti-nausea med. Pt denies fever, chills, and abdominal pain. He states his bowels are moving normally. Advised will update MD and call back with recs. Pt's wife verb understanding.

## 2019-05-02 NOTE — TELEPHONE ENCOUNTER
----- Message from Tierra Prakash sent at 5/2/2019 12:01 PM EDT -----  Regarding: QUESTIONS  Contact: [phone]  PT WIFE ORQUIDEA CALLED AGAIN REGARDING APPT WITH DR VIRK. Pending sale to [facility] ON 05/14.  HOWEVER, HE IS HAVING A HARD TIME BREATHING AND HE HAS JAUNDICE. SHOULD HE GO TO THE ER? PT RECENTLY D/C FROM [facility].

## 2019-05-04 ENCOUNTER — HOSPITAL ENCOUNTER (INPATIENT)
Facility: HOSPITAL | Age: 79
LOS: 3 days | Discharge: HOME | End: 2019-05-07
Attending: EMERGENCY MEDICINE | Admitting: HOSPITALIST

## 2019-05-04 ENCOUNTER — APPOINTMENT (OUTPATIENT)
Dept: GENERAL RADIOLOGY | Facility: HOSPITAL | Age: 79
End: 2019-05-04

## 2019-05-04 ENCOUNTER — READMISSION MANAGEMENT (OUTPATIENT)
Dept: CALL CENTER | Facility: HOSPITAL | Age: 79
End: 2019-05-04

## 2019-05-04 ENCOUNTER — APPOINTMENT (OUTPATIENT)
Dept: CT IMAGING | Facility: HOSPITAL | Age: 79
End: 2019-05-04

## 2019-05-04 DIAGNOSIS — R79.89 ELEVATED LIVER FUNCTION TESTS: Primary | ICD-10-CM

## 2019-05-04 DIAGNOSIS — E80.6 HYPERBILIRUBINEMIA: ICD-10-CM

## 2019-05-04 DIAGNOSIS — K83.1 BILIARY STRICTURE: ICD-10-CM

## 2019-05-04 LAB
ALBUMIN SERPL-MCNC: 3.5 G/DL (ref 3.5–5.2)
ALBUMIN/GLOB SERPL: 1.1 G/DL
ALP SERPL-CCNC: 690 U/L (ref 39–117)
ALT SERPL W P-5'-P-CCNC: 265 U/L (ref 1–41)
ANION GAP SERPL CALCULATED.3IONS-SCNC: 12.9 MMOL/L
APTT PPP: 25.9 SECONDS (ref 22.7–35.4)
AST SERPL-CCNC: 160 U/L (ref 1–40)
BILIRUB SERPL-MCNC: 15.2 MG/DL (ref 0.2–1.2)
BUN BLD-MCNC: 14 MG/DL (ref 8–23)
BUN/CREAT SERPL: 13 (ref 7–25)
CALCIUM SPEC-SCNC: 9 MG/DL (ref 8.6–10.5)
CHLORIDE SERPL-SCNC: 98 MMOL/L (ref 98–107)
CO2 SERPL-SCNC: 24.1 MMOL/L (ref 22–29)
CREAT BLD-MCNC: 1.08 MG/DL (ref 0.76–1.27)
DEPRECATED RDW RBC AUTO: 61.4 FL (ref 37–54)
EOSINOPHIL # BLD MANUAL: 0.06 10*3/MM3 (ref 0–0.4)
EOSINOPHIL NFR BLD MANUAL: 1 % (ref 0.3–6.2)
ERYTHROCYTE [DISTWIDTH] IN BLOOD BY AUTOMATED COUNT: 18 % (ref 12.3–15.4)
GFR SERPL CREATININE-BSD FRML MDRD: 66 ML/MIN/1.73
GLOBULIN UR ELPH-MCNC: 3.1 GM/DL
GLUCOSE BLD-MCNC: 123 MG/DL (ref 65–99)
HAV IGM SERPL QL IA: NORMAL
HBV CORE IGM SERPL QL IA: NORMAL
HBV SURFACE AG SERPL QL IA: NORMAL
HCT VFR BLD AUTO: 37.5 % (ref 37.5–51)
HCV AB SER DONR QL: NORMAL
HGB BLD-MCNC: 12 G/DL (ref 13–17.7)
INR PPP: 0.94 (ref 0.9–1.1)
LIPASE SERPL-CCNC: 9 U/L (ref 13–60)
LYMPHOCYTES # BLD MANUAL: 1.77 10*3/MM3 (ref 0.7–3.1)
LYMPHOCYTES NFR BLD MANUAL: 30 % (ref 19.6–45.3)
LYMPHOCYTES NFR BLD MANUAL: 5 % (ref 5–12)
MCH RBC QN AUTO: 29.7 PG (ref 26.6–33)
MCHC RBC AUTO-ENTMCNC: 32 G/DL (ref 31.5–35.7)
MCV RBC AUTO: 92.8 FL (ref 79–97)
MONOCYTES # BLD AUTO: 0.3 10*3/MM3 (ref 0.1–0.9)
NEUTROPHILS # BLD AUTO: 3.78 10*3/MM3 (ref 1.7–7)
NEUTROPHILS NFR BLD MANUAL: 64 % (ref 42.7–76)
NRBC BLD AUTO-RTO: 0.5 /100 WBC (ref 0–0.2)
PLAT MORPH BLD: NORMAL
PLATELET # BLD AUTO: 248 10*3/MM3 (ref 140–450)
PMV BLD AUTO: 9.2 FL (ref 6–12)
POTASSIUM BLD-SCNC: 4 MMOL/L (ref 3.5–5.2)
PROT SERPL-MCNC: 6.6 G/DL (ref 6–8.5)
PROTHROMBIN TIME: 12.3 SECONDS (ref 11.7–14.2)
RBC # BLD AUTO: 4.04 10*6/MM3 (ref 4.14–5.8)
RBC MORPH BLD: NORMAL
SODIUM BLD-SCNC: 135 MMOL/L (ref 136–145)
WBC MORPH BLD: NORMAL
WBC NRBC COR # BLD: 5.9 10*3/MM3 (ref 3.4–10.8)

## 2019-05-04 PROCEDURE — 71046 X-RAY EXAM CHEST 2 VIEWS: CPT

## 2019-05-04 PROCEDURE — 85025 COMPLETE CBC W/AUTO DIFF WBC: CPT | Performed by: EMERGENCY MEDICINE

## 2019-05-04 PROCEDURE — 74177 CT ABD & PELVIS W/CONTRAST: CPT

## 2019-05-04 PROCEDURE — 85007 BL SMEAR W/DIFF WBC COUNT: CPT | Performed by: EMERGENCY MEDICINE

## 2019-05-04 PROCEDURE — 25010000002 IOPAMIDOL 61 % SOLUTION: Performed by: HOSPITALIST

## 2019-05-04 PROCEDURE — 80053 COMPREHEN METABOLIC PANEL: CPT | Performed by: EMERGENCY MEDICINE

## 2019-05-04 PROCEDURE — 85730 THROMBOPLASTIN TIME PARTIAL: CPT | Performed by: EMERGENCY MEDICINE

## 2019-05-04 PROCEDURE — 93010 ELECTROCARDIOGRAM REPORT: CPT | Performed by: INTERNAL MEDICINE

## 2019-05-04 PROCEDURE — 25010000002 CEFTRIAXONE PER 250 MG: Performed by: HOSPITALIST

## 2019-05-04 PROCEDURE — 25010000002 CEFTRIAXONE PER 250 MG: Performed by: EMERGENCY MEDICINE

## 2019-05-04 PROCEDURE — 93005 ELECTROCARDIOGRAM TRACING: CPT | Performed by: EMERGENCY MEDICINE

## 2019-05-04 PROCEDURE — 83690 ASSAY OF LIPASE: CPT | Performed by: EMERGENCY MEDICINE

## 2019-05-04 PROCEDURE — 80074 ACUTE HEPATITIS PANEL: CPT | Performed by: EMERGENCY MEDICINE

## 2019-05-04 PROCEDURE — 99284 EMERGENCY DEPT VISIT MOD MDM: CPT

## 2019-05-04 PROCEDURE — 63710000001 PREDNISONE PER 5 MG: Performed by: HOSPITALIST

## 2019-05-04 PROCEDURE — 85610 PROTHROMBIN TIME: CPT | Performed by: EMERGENCY MEDICINE

## 2019-05-04 PROCEDURE — 99221 1ST HOSP IP/OBS SF/LOW 40: CPT | Performed by: INTERNAL MEDICINE

## 2019-05-04 RX ORDER — CEFTRIAXONE SODIUM 1 G/50ML
1 INJECTION, SOLUTION INTRAVENOUS ONCE
Status: COMPLETED | OUTPATIENT
Start: 2019-05-04 | End: 2019-05-04

## 2019-05-04 RX ORDER — SODIUM CHLORIDE 0.9 % (FLUSH) 0.9 %
10 SYRINGE (ML) INJECTION AS NEEDED
Status: DISCONTINUED | OUTPATIENT
Start: 2019-05-04 | End: 2019-05-07 | Stop reason: HOSPADM

## 2019-05-04 RX ORDER — SODIUM CHLORIDE 0.9 % (FLUSH) 0.9 %
3 SYRINGE (ML) INJECTION EVERY 12 HOURS SCHEDULED
Status: DISCONTINUED | OUTPATIENT
Start: 2019-05-04 | End: 2019-05-07 | Stop reason: HOSPADM

## 2019-05-04 RX ORDER — ACETAMINOPHEN 325 MG/1
650 TABLET ORAL EVERY 4 HOURS PRN
Status: DISCONTINUED | OUTPATIENT
Start: 2019-05-04 | End: 2019-05-07 | Stop reason: HOSPADM

## 2019-05-04 RX ORDER — CEFTRIAXONE SODIUM 2 G/50ML
2 INJECTION, SOLUTION INTRAVENOUS EVERY 24 HOURS
Status: DISCONTINUED | OUTPATIENT
Start: 2019-05-05 | End: 2019-05-07 | Stop reason: HOSPADM

## 2019-05-04 RX ORDER — PREDNISONE 1 MG/1
1 TABLET ORAL 2 TIMES DAILY
COMMUNITY
End: 2019-08-07

## 2019-05-04 RX ORDER — ONDANSETRON 4 MG/1
4 TABLET, FILM COATED ORAL EVERY 6 HOURS PRN
Status: DISCONTINUED | OUTPATIENT
Start: 2019-05-04 | End: 2019-05-07 | Stop reason: HOSPADM

## 2019-05-04 RX ORDER — CEFTRIAXONE SODIUM 1 G/50ML
1 INJECTION, SOLUTION INTRAVENOUS EVERY 24 HOURS
Status: COMPLETED | OUTPATIENT
Start: 2019-05-04 | End: 2019-05-04

## 2019-05-04 RX ORDER — IPRATROPIUM BROMIDE AND ALBUTEROL SULFATE 2.5; .5 MG/3ML; MG/3ML
3 SOLUTION RESPIRATORY (INHALATION) EVERY 4 HOURS PRN
Status: DISCONTINUED | OUTPATIENT
Start: 2019-05-04 | End: 2019-05-07 | Stop reason: HOSPADM

## 2019-05-04 RX ORDER — PREDNISONE 1 MG/1
1 TABLET ORAL
Status: DISCONTINUED | OUTPATIENT
Start: 2019-05-04 | End: 2019-05-07 | Stop reason: HOSPADM

## 2019-05-04 RX ORDER — SODIUM CHLORIDE 0.9 % (FLUSH) 0.9 %
3-10 SYRINGE (ML) INJECTION AS NEEDED
Status: DISCONTINUED | OUTPATIENT
Start: 2019-05-04 | End: 2019-05-07 | Stop reason: HOSPADM

## 2019-05-04 RX ORDER — SODIUM CHLORIDE 9 MG/ML
100 INJECTION, SOLUTION INTRAVENOUS CONTINUOUS
Status: DISCONTINUED | OUTPATIENT
Start: 2019-05-04 | End: 2019-05-07 | Stop reason: HOSPADM

## 2019-05-04 RX ORDER — ONDANSETRON 2 MG/ML
4 INJECTION INTRAMUSCULAR; INTRAVENOUS EVERY 6 HOURS PRN
Status: DISCONTINUED | OUTPATIENT
Start: 2019-05-04 | End: 2019-05-07 | Stop reason: HOSPADM

## 2019-05-04 RX ORDER — LEVOTHYROXINE SODIUM 0.1 MG/1
200 TABLET ORAL
Status: DISCONTINUED | OUTPATIENT
Start: 2019-05-05 | End: 2019-05-07 | Stop reason: HOSPADM

## 2019-05-04 RX ADMIN — CEFTRIAXONE SODIUM 1 G: 1 INJECTION, SOLUTION INTRAVENOUS at 14:05

## 2019-05-04 RX ADMIN — SODIUM CHLORIDE, PRESERVATIVE FREE 3 ML: 5 INJECTION INTRAVENOUS at 19:02

## 2019-05-04 RX ADMIN — PREDNISONE 1 MG: 1 TABLET ORAL at 18:40

## 2019-05-04 RX ADMIN — CEFTRIAXONE SODIUM 1 G: 1 INJECTION, SOLUTION INTRAVENOUS at 18:57

## 2019-05-04 RX ADMIN — IOPAMIDOL 85 ML: 612 INJECTION, SOLUTION INTRAVENOUS at 16:59

## 2019-05-04 RX ADMIN — SODIUM CHLORIDE 100 ML/HR: 9 INJECTION, SOLUTION INTRAVENOUS at 19:33

## 2019-05-04 NOTE — PLAN OF CARE
Problem: Patient Care Overview  Goal: Plan of Care Review  Outcome: Ongoing (interventions implemented as appropriate)   05/04/19 1720   Coping/Psychosocial   Plan of Care Reviewed With patient   Plan of Care Review   Progress no change   OTHER   Outcome Summary Pt admitted with juandice and elevated liver function tests. CT of abd and pelvis ordered for pt. GI consult ordered. Will continue to observe pt and treat as necessary.      Goal: Individualization and Mutuality  Outcome: Ongoing (interventions implemented as appropriate)    Goal: Discharge Needs Assessment  Outcome: Ongoing (interventions implemented as appropriate)    Goal: Interprofessional Rounds/Family Conf  Outcome: Ongoing (interventions implemented as appropriate)      Problem: Fall Risk (Adult)  Goal: Identify Related Risk Factors and Signs and Symptoms  Outcome: Outcome(s) achieved Date Met: 05/04/19    Goal: Absence of Fall  Outcome: Ongoing (interventions implemented as appropriate)      Problem: Nausea/Vomiting (Adult)  Goal: Identify Related Risk Factors and Signs and Symptoms  Outcome: Outcome(s) achieved Date Met: 05/04/19    Goal: Symptom Relief  Outcome: Ongoing (interventions implemented as appropriate)    Goal: Adequate Hydration  Outcome: Ongoing (interventions implemented as appropriate)      Problem: Liver Failure, Acute/Chronic (Adult)  Goal: Signs and Symptoms of Listed Potential Problems Will be Absent, Minimized or Managed (Liver Failure, Acute/Chronic)  Outcome: Ongoing (interventions implemented as appropriate)

## 2019-05-04 NOTE — ED PROVIDER NOTES
EMERGENCY DEPARTMENT ENCOUNTER    Room Number:  10/10  Date seen:  5/4/2019  Time seen: 1:48 PM  PCP: Pierre Olson MD  Historian: patient      HPI:  Chief Complaint: jaundice  Context: Ismael Ball is a 79 y.o. male with Hx of bile duct stent, who presents to the ED c/o jaundice onset about 3 days ago. Pt was d/c'd from the hospital about a week and a half ago after being treated for CBD obstruction. He reports that he was then given a 7 day course of Levaquin that he finished yesterday. He states that he noticed jaundice yesterday that has been worsening. He denies abd pain, but reports nausea and increased SOA (usually chronic for the pt). Denies abnormal BMs. There are no other complaints at this time.     Quality: jaundice  Intensity/Severity: moderate  Duration: about two days  Onset quality: gradual  Timing: constant  Progression: worsened  Aggravating Factors: none  Alleviating Factors: none  Previous Episodes: Pt has Hx of bile duct stent and CBD obstruction.  Treatment before arrival: none  Associated Symptoms: nausea and increased SOA    PAST MEDICAL HISTORY  Active Ambulatory Problems     Diagnosis Date Noted   • Acquired hypothyroidism 02/18/2016   • Essential hypertension 02/18/2016   • Mixed hyperlipidemia 02/18/2016   • History of CVA (cerebrovascular accident) 02/18/2016   • History of prostate cancer 02/18/2016   • Shortness of breath 02/22/2017   • Biliary stricture 02/11/2017   • Ascending cholangitis 03/22/2017   • Temporary cerebral vascular dysfunction 07/10/2013   • Retroperitoneal lymphadenopathy 05/15/2013   • Malignant neoplasm of prostate (CMS/HCC) 04/18/2017   • Malignant neoplasm metastatic to bone (CMS/HCC) 05/15/2013   • Elevated prostate specific antigen (PSA) 05/15/2013   • Elevated brain natriuretic peptide (BNP) level 04/20/2017   • Elevated liver function tests 04/24/2019   • Biliary stent obstruction 04/24/2019   • Bacteremia due to Escherichia coli 04/25/2019   • Acute  cholangitis 04/25/2019     Resolved Ambulatory Problems     Diagnosis Date Noted   • E. coli sepsis (CMS/HCC) 03/22/2017     Past Medical History:   Diagnosis Date   • Abdominal pain    • Anemia    • Aneurysm of ascending aorta (CMS/HCC)    • Arthritis    • Ascending cholangitis    • Aspiration pneumonia of right lower lobe (CMS/HCC)    • Biliary stricture    • Cancer (CMS/HCC)    • Chills    • Cholangitis    • Chronic adrenal insufficiency (CMS/HCC)    • CVA (cerebral vascular accident) (CMS/HCC)    • Diaphoresis    • Diarrhea    • Diverticulosis of colon    • Dizziness    • E coli infection 2017   • Elevated brain natriuretic peptide (BNP) level    • Elevated cholesterol    • Elevated liver function tests    • Elevated liver function tests 4/24/2019   • Fatigue    • Fever    • Herpes labialis    • History of pneumonia    • History of transfusion    • Hyperlipidemia    • Hypertension    • Hypothyroidism    • Metastatic disease (CMS/HCC)    • PONV (postoperative nausea and vomiting)    • Prostate cancer (CMS/HCC)    • Septic shock (CMS/HCC)    • Shortness of breath    • Vomiting    • Weakness          PAST SURGICAL HISTORY  Past Surgical History:   Procedure Laterality Date   • BILE DUCT STENT PLACEMENT     • CARDIAC CATHETERIZATION     • COLONOSCOPY  unknown    normal   • EYE SURGERY  12/05/2015    cataract   • HERNIA REPAIR     • HI ERCP DX COLLECTION SPECIMEN BRUSHING/WASHING N/A 3/24/2017    Procedure: ENDOSCOPIC RETROGRADE CHOLANGIOPANCREATOGRAPHY, STENT REMOVAL, STENT PLACEMENT, CHOLANGIOGRAM;  Surgeon: Oscar Evangelista MD;  Location: [facility] OR;  Service: Gastroenterology   • HI ERCP DX COLLECTION SPECIMEN BRUSHING/WASHING N/A 5/1/2017    Procedure: ENDOSCOPIC RETROGRADE CHOLANGIOPANCREATOGRAPHY WITH STENT REMOVAL, SPYGLASS BIOPSIES AND WALLFLEX BILIARY STENT PLACEMENT;  Surgeon: Oscar Evangelista MD;  Location: [facility] ENDOSCOPY;  Service: Gastroenterology   • PROSTATECTOMY     • TOTAL HIP  ARTHROPLASTY Right          FAMILY HISTORY  Family History   Problem Relation Age of Onset   • Arthritis Mother    • Hypertension Mother    • Arthritis Father    • Cancer Father    • Heart disease Father    • Heart attack Father    • Heart disease Sister          SOCIAL HISTORY  Social History     Socioeconomic History   • Marital status:      Spouse name: Not on file   • Number of children: Not on file   • Years of education: Not on file   • Highest education level: Not on file   Tobacco Use   • Smoking status: Former Smoker     Types: Cigars   • Smokeless tobacco: Never Used   • Tobacco comment: cigar   Substance and Sexual Activity   • Alcohol use: Yes     Comment: A GLASS OF WINE ONCE A MONTH    • Sexual activity: Defer         ALLERGIES  Penicillins and Statins        REVIEW OF SYSTEMS  Review of Systems   Constitutional: Negative for diaphoresis and fever.   HENT: Negative for congestion.    Eyes: Negative for visual disturbance.   Respiratory: Positive for shortness of breath (increased).    Cardiovascular: Negative for palpitations.   Gastrointestinal: Positive for nausea. Negative for blood in stool and vomiting.   Endocrine: Negative for polyuria.   Genitourinary: Negative for flank pain.   Musculoskeletal: Negative for joint swelling.   Skin: Positive for color change (jaundice). Negative for wound.   Neurological: Negative for seizures.   Hematological: Negative for adenopathy.   Psychiatric/Behavioral: Negative for sleep disturbance.       PHYSICAL EXAM  ED Triage Vitals   Temp Heart Rate Resp BP SpO2   05/04/19 1229 05/04/19 1229 05/04/19 1242 05/04/19 1242 05/04/19 1229   97.5 °F (36.4 °C) 75 18 114/71 95 %      Temp src Heart Rate Source Patient Position BP Location FiO2 (%)   05/04/19 1229 05/04/19 1229 -- -- --   Tympanic Monitor            GENERAL: awake, alert, jaundiced, but no acute distress  HENT: nares patent  EYES: no scleral icterus  CV: regular rhythm, normal rate, normal heart  sounds  RESPIRATORY: normal effort, CTAB  ABDOMEN: soft, minimal suprapubic tenderness, no RUQ tenderness  MUSCULOSKELETAL: no deformity  NEURO: alert, moves all extremities, follows commands  SKIN: warm, dry    Vital signs and nursing notes reviewed.      LAB RESULTS  Recent Results (from the past 24 hour(s))   Comprehensive Metabolic Panel    Collection Time: 05/04/19 12:47 PM   Result Value Ref Range    Glucose 123 (H) 65 - 99 mg/dL    BUN 14 8 - 23 mg/dL    Creatinine 1.08 0.76 - 1.27 mg/dL    Sodium 135 (L) 136 - 145 mmol/L    Potassium 4.0 3.5 - 5.2 mmol/L    Chloride 98 98 - 107 mmol/L    CO2 24.1 22.0 - 29.0 mmol/L    Calcium 9.0 8.6 - 10.5 mg/dL    Total Protein 6.6 6.0 - 8.5 g/dL    Albumin 3.50 3.50 - 5.20 g/dL    ALT (SGPT) 265 (H) 1 - 41 U/L    AST (SGOT) 160 (H) 1 - 40 U/L    Alkaline Phosphatase 690 (H) 39 - 117 U/L    Total Bilirubin 15.2 (H) 0.2 - 1.2 mg/dL    eGFR Non African Amer 66 >60 mL/min/1.73    Globulin 3.1 gm/dL    A/G Ratio 1.1 g/dL    BUN/Creatinine Ratio 13.0 7.0 - 25.0    Anion Gap 12.9 mmol/L   Lipase    Collection Time: 05/04/19 12:47 PM   Result Value Ref Range    Lipase 9 (L) 13 - 60 U/L   Protime-INR    Collection Time: 05/04/19 12:47 PM   Result Value Ref Range    Protime 12.3 11.7 - 14.2 Seconds    INR 0.94 0.90 - 1.10   aPTT    Collection Time: 05/04/19 12:47 PM   Result Value Ref Range    PTT 25.9 22.7 - 35.4 seconds   CBC Auto Differential    Collection Time: 05/04/19 12:47 PM   Result Value Ref Range    WBC 5.90 3.40 - 10.80 10*3/mm3    RBC 4.04 (L) 4.14 - 5.80 10*6/mm3    Hemoglobin 12.0 (L) 13.0 - 17.7 g/dL    Hematocrit 37.5 37.5 - 51.0 %    MCV 92.8 79.0 - 97.0 fL    MCH 29.7 26.6 - 33.0 pg    MCHC 32.0 31.5 - 35.7 g/dL    RDW 18.0 (H) 12.3 - 15.4 %    RDW-SD 61.4 (H) 37.0 - 54.0 fl    MPV 9.2 6.0 - 12.0 fL    Platelets 248 140 - 450 10*3/mm3    nRBC 0.5 (H) 0.0 - 0.2 /100 WBC   Manual Differential    Collection Time: 05/04/19 12:47 PM   Result Value Ref Range     Neutrophil % 64.0 42.7 - 76.0 %    Lymphocyte % 30.0 19.6 - 45.3 %    Monocyte % 5.0 5.0 - 12.0 %    Eosinophil % 1.0 0.3 - 6.2 %    Neutrophils Absolute 3.78 1.70 - 7.00 10*3/mm3    Lymphocytes Absolute 1.77 0.70 - 3.10 10*3/mm3    Monocytes Absolute 0.30 0.10 - 0.90 10*3/mm3    Eosinophils Absolute 0.06 0.00 - 0.40 10*3/mm3    RBC Morphology Normal Normal    WBC Morphology Normal Normal    Platelet Morphology Normal Normal       Ordered the above labs and reviewed the results.      RADIOLOGY  Xr Chest 2 View    Result Date: 5/4/2019  EXAMINATION: 2 VIEWS OF THE CHEST  HISTORY: 79-year-old male with history of shortness of air.  FINDINGS: PA and lateral chest radiographs were obtained. Comparison is made to a prior chest CT dated 04/17/2019. There is minimal atelectasis at the base of the right lung with elevation of the right hemidiaphragm that appears stable. The lungs are otherwise clear. Areas of sclerosis in multiple ribs and osseous structures are noted and are consistent with known sclerotic bony metastases. Mild atheromatous calcification is seen within the thoracic aorta. The cardiac silhouette is within normal limits.      Stable chest radiograph when compared to a prior chest CT dated 04/17/2019. Stable elevation of the right hemidiaphragm with atelectasis at the base of the right lower lobe is noted. Stable evidence of sclerotic bony metastases is also noted.        Ordered the above noted radiological studies. Reviewed by me in PACS.      PROCEDURES  Procedures    EKG:           EKG time: 1238  Rhythm/Rate: NSR 70  P waves and DC: nml   QRS, axis: nml   ST and T waves: no acute ischemic changes     Interpreted Contemporaneously by me, independently viewed  Unchanged compared to prior 4/26/19      MEDICATIONS GIVEN IN ER  Medications   sodium chloride 0.9 % flush 10 mL (not administered)   cefTRIAXone (ROCEPHIN) IVPB 1 g (1 g Intravenous New Bag 5/4/19 1405)       PROGRESS AND CONSULTS      1239-  Ordered labs and CXR.     1347- Ordered CT abd.     1357- Ordered Rocephin for possible pt infection. Placed call to [facility].     1408- Discussed pt with Dr. Desouza ([facility]) who who agrees to admit the pt. He also asks that gastroenterology be contacted.     1410- Placed call to Gastroenterology.     1414- Rechecked pt. Pt is resting comfortably. Notified pt of his current CXR, lab, and EKG results. Informed the pt of my discussion with Dr. Desouza. Discussed the plan to admit the pt to Dr. Desouza for further evaluation and treatment. Further discussed the plan to contact GI and to administer IV Rocephin. Pt understands and agrees with the plan, all questions answered.    1433- Discussed pt with Dr. Mendieta (GI).  He recommended calling Dr. Gilbert if ERCP needed this weekend.  This was communicated to Dr. Desouza, the admitting hospitalist.       MEDICAL DECISION MAKING    79-year-old male with history of biliary stent, recent admission for acute cholangitis, E. coli bacteremia treated with Levaquin, presented today for jaundice.  His has worsening transaminitis and hyperbilirubinemia on labs today.  There was some question that his biliary stent may have been malfunctioning on recent imaging however given his LFTs were downtrending it is felt this is unlikely.  Today's labs certainly raise further suspicion that there could be malfunction of the stent.  A repeat CT scan has been ordered and is pending.  I have ordered empiric Rocephin for cholangitis prophylaxis.  He is afebrile currently.  He will be admitted to the hospitalist and GI will be consulted for further evaluation.  An acute hepatitis panel was also ordered.    MDM  Number of Diagnoses or Management Options  Elevated liver function tests:   Hyperbilirubinemia:      Amount and/or Complexity of Data Reviewed  Clinical lab tests: ordered and reviewed (ALT (SGPT) 265  AST (SGOT) 160  Alkaline Phosphatase 690  Total Bilirubin 15.2)  Tests in the radiology section of  CPT®: ordered and reviewed (CXR- Stable chest radiograph when compared to a prior chest CT dated 04/17/2019. Stable elevation of the right hemidiaphragm with atelectasis at the base of the right lower lobe is noted. Stable evidence of sclerotic bony metastases is also noted. )  Tests in the medicine section of CPT®: ordered and reviewed (See EKG note.)  Decide to obtain previous medical records or to obtain history from someone other than the patient: yes  Discuss the patient with other providers: yes (Dr. Desouza ([facility])  Dr. Mendieta ())  Independent visualization of images, tracings, or specimens: yes    Patient Progress  Patient progress: stable           DIAGNOSIS  Final diagnoses:   Elevated liver function tests   Hyperbilirubinemia       DISPOSITION  ADMISSION    Discussed treatment plan and reason for admission with pt/family and admitting physician.  Pt/family voiced understanding of the plan for admission for further testing/treatment as needed.     Latest Documented Vital Signs:  As of 2:16 PM  BP- 106/65 HR- 64 Temp- 97.5 °F (36.4 °C) (Tympanic) O2 sat- 98%    --  Documentation assistance provided by [employer] Mcclendon for Dr. DEBORAH Lopez MD.  Information recorded by the [employer] was done at my direction and has been verified and validated by me.     Pierre Mcclendon  05/04/19 1520       Manuel Lopez MD  05/04/19 9532

## 2019-05-04 NOTE — H&P
HISTORY AND PHYSICAL   [facility]        Patient Identification:  Name: Ismael Ball  Age: 79 y.o.  Sex: male  :  1940  MRN: [id number]                     Primary Care Physician: Pierre Olson MD    Chief Complaint:  jaundice    History of Present Illness:          Patient is a 79-year-old white male with history of anemia, arthritis, recent admission for cholangitis with history of biliary stent, prostate cancer with bone metastases, chronic adrenal insufficiency, previous stroke, hyperlipidemia and hypothyroidism who recently been in the hospital with cholangitis and bacteremia with E. coli had been treated outpatient with Levaquin and subsequently finished antibiotics in the last day or so.  He noticed increasing jaundice over the last couple days and was worse today.  He had some nausea but no vomiting.  He has had some mild abdominal discomfort but nothing really severe.  He noticed that his color was markedly more jaundiced than it had been and he came to the emergency room for further evaluation treatment.  He has not had any fever or chills.  The patient was started on some IV antibiotics and admitted for further evaluation treatment of jaundice with history of biliary stent and recent admission for cholangitis and E. coli bacteremia.    Past Medical History:  Past Medical History:   Diagnosis Date   • Abdominal pain    • Anemia    • Aneurysm of ascending aorta (CMS/HCC)    • Arthritis    • Ascending cholangitis    • Aspiration pneumonia of right lower lobe (CMS/HCC)    • Biliary stricture    • Cancer (CMS/HCC)    • Chills    • Cholangitis    • Chronic adrenal insufficiency (CMS/HCC)    • CVA (cerebral vascular accident) (CMS/HCC)    • Diaphoresis    • Diarrhea    • Diverticulosis of colon    • Dizziness    • E coli infection 2017    blood   • Elevated brain natriuretic peptide (BNP) level    • Elevated cholesterol    • Elevated liver function tests    • Elevated liver function  tests 4/24/2019   • Fatigue    • Fever    • Herpes labialis    • History of pneumonia     MARCH 2017   • History of transfusion    • Hyperlipidemia    • Hypertension    • Hypothyroidism    • Metastatic disease (CMS/HCC)    • PONV (postoperative nausea and vomiting)    • Prostate cancer (CMS/HCC)     with bony metastases   • Septic shock (CMS/HCC)    • Shortness of breath    • Vomiting    • Weakness      Past Surgical History:  Past Surgical History:   Procedure Laterality Date   • BILE DUCT STENT PLACEMENT     • CARDIAC CATHETERIZATION     • COLONOSCOPY  unknown    normal   • EYE SURGERY  12/05/2015    cataract   • HERNIA REPAIR     • [place] ERCP DX COLLECTION SPECIMEN BRUSHING/WASHING N/A 3/24/2017    Procedure: ENDOSCOPIC RETROGRADE CHOLANGIOPANCREATOGRAPHY, STENT REMOVAL, STENT PLACEMENT, CHOLANGIOGRAM;  Surgeon: Oscar Evangelista MD;  Location: [facility] MAIN OR;  Service: Gastroenterology   • [place] ERCP DX COLLECTION SPECIMEN BRUSHING/WASHING N/A 5/1/2017    Procedure: ENDOSCOPIC RETROGRADE CHOLANGIOPANCREATOGRAPHY WITH STENT REMOVAL, SPYGLASS BIOPSIES AND WALLFLEX BILIARY STENT PLACEMENT;  Surgeon: Oscar Evangelista MD;  Location: [facility] ENDOSCOPY;  Service: Gastroenterology   • PROSTATECTOMY     • TOTAL HIP ARTHROPLASTY Right       Home Meds:  Medications Prior to Admission   Medication Sig Dispense Refill Last Dose   • aspirin 81 MG tablet Take 1 tablet by mouth Daily for 30 days. 30 tablet 0 5/4/2019 at Unknown time   • Calcium Carb-Cholecalciferol (CALCIUM-VITAMIN D3) 600-400 MG-UNIT tablet Take 2 tablets by mouth Daily.   5/3/2019 at Unknown time   • levothyroxine (SYNTHROID, LEVOTHROID) 200 MCG tablet Take 1 tablet by mouth Daily for 180 days. 90 tablet 1 5/4/2019 at Unknown time   • predniSONE (DELTASONE) 1 MG tablet Take 1 mg by mouth 2 (Two) Times a Day.   5/3/2019 at Unknown time   • telmisartan (MICARDIS) 20 MG tablet Take 1 tablet by mouth Every Night for 180 days. 90 tablet 1 5/3/2019 at Unknown time    • Umeclidinium Bromide (INCRUSE ELLIPTA) 62.5 MCG/INH aerosol powder  Inhale 1 puff Every Morning.   5/4/2019 at Unknown time     Current meds    Current Facility-Administered Medications:   •  [COMPLETED] Insert peripheral IV, , , Once **AND** sodium chloride 0.9 % flush 10 mL, 10 mL, Intravenous, PRN, Manuel Lopez MD  Allergies:  Allergies   Allergen Reactions   • Penicillins      Unknown, many years ago per pt when he was a child; Tolerated cephalosporins (cefepime) during 3/2017 admission.    • Statins Myalgia     Immunizations:  Immunization History   Administered Date(s) Administered   • Flu Vaccine High Dose PF 65YR+ 09/01/2012, 09/21/2016, 10/11/2017   • Influenza TIV (IM) 10/31/2013, 11/04/2015   • Pneumococcal Polysaccharide (PPSV23) 12/31/2009     Social History:   Social History     Social History Narrative   • Not on file     Social History     Socioeconomic History   • Marital status:      Spouse name: Not on file   • Number of children: Not on file   • Years of education: Not on file   • Highest education level: Not on file   Tobacco Use   • Smoking status: Former Smoker     Types: Cigars   • Smokeless tobacco: Never Used   • Tobacco comment: cigar   Substance and Sexual Activity   • Alcohol use: Yes     Comment: A GLASS OF WINE ONCE A MONTH    • Sexual activity: Defer       Family History:  Family History   Problem Relation Age of Onset   • Arthritis Mother    • Hypertension Mother    • Arthritis Father    • Cancer Father    • Heart disease Father    • Heart attack Father    • Heart disease Sister         Review of Systems  See history of present illness and past medical history.  Patient denies headache, dizziness, syncope, falls, trauma, change in vision, change in hearing, change in taste, changes in weight, changes in appetite, focal weakness, numbness, or paresthesia.  Patient denies chest pain, palpitations, dyspnea, orthopnea, PND, cough, sinus pressure, rhinorrhea,  "epistaxis, hemoptysis,  vomiting, hematemesis, diarrhea, constipation or hematchezia.  Denies cold or heat intolerance, polydipsia, polyuria, polyphagia. Denies hematuria, pyuria, dysuria, hesitancy, frequency or urgency.   Denies fever, chills, sweats, night sweats.   Remainder of ROS is negative.    Objective:  tMax 24 hrs: Temp (24hrs), Av.6 °F (36.4 °C), Min:97.5 °F (36.4 °C), Max:97.6 °F (36.4 °C)    Vitals Ranges:   Temp:  [97.5 °F (36.4 °C)-97.6 °F (36.4 °C)] 97.6 °F (36.4 °C)  Heart Rate:  [64-75] 68  Resp:  [16-18] 18  BP: (106-120)/(65-71) 120/68      Exam:  /68 (BP Location: Right arm, Patient Position: Lying)   Pulse 68   Temp 97.6 °F (36.4 °C) (Oral)   Resp 18   Ht 177.8 cm (70\")   Wt 79.8 kg (176 lb)   SpO2 98%   BMI 25.25 kg/m²     General Appearance:    Alert, cooperative, no distress, appears stated age   Head:    Normocephalic, without obvious abnormality, atraumatic   Eyes:    PERRL, conjunctiva/corneas jaundiced, EOM's intact, both eyes   Ears:    Normal external ear canals, both ears   Nose:   Nares normal, septum midline, mucosa normal, no drainage    or sinus tenderness   Throat:   Lips, mucosa, and tongue normal   Neck:   Supple, symmetrical, trachea midline, no adenopathy;     thyroid:  no enlargement/tenderness/nodules; no carotid    bruit or JVD   Back:     Symmetric, no curvature, ROM normal, no CVA tenderness   Lungs:     Clear to auscultation bilaterally, respirations unlabored   Chest Wall:    No tenderness or deformity    Heart:    Regular rate and rhythm, S1 and S2 normal, no murmur, rub   or gallop   Abdomen:     Soft, non-tender, bowel sounds active all four quadrants,     no masses, no hepatomegaly, no splenomegaly   Extremities:   Extremities normal, atraumatic, no cyanosis or edema   Pulses:   2+ and symmetric all extremities   Skin:   Skin color, texture, turgor normal, no rashes or lesions   Lymph nodes:   Cervical, supraclavicular, and axillary nodes normal "   Neurologic:   CNII-XII intact, normal strength, sensation intact throughout      .    Data Review:  Lab Results (last 72 hours)     Procedure Component Value Units Date/Time    Hepatitis Panel, Acute [[id number]]  (Normal) Collected:  05/04/19 1345    Specimen:  Blood Updated:  05/04/19 1453     Hepatitis B Surface Ag Non-Reactive     Hep A IgM Non-Reactive     Hep B C IgM Non-Reactive     Hepatitis C Ab Non-Reactive    CBC & Differential [[id number]] Collected:  05/04/19 1247    Specimen:  Blood Updated:  05/04/19 1404    Narrative:       The following orders were created for panel order CBC & Differential.  Procedure                               Abnormality         Status                     ---------                               -----------         ------                     CBC Auto Differential[[id number]]        Abnormal            Final result                 Please view results for these tests on the individual orders.    CBC Auto Differential [[id number]]  (Abnormal) Collected:  05/04/19 1247    Specimen:  Blood Updated:  05/04/19 1404     WBC 5.90 10*3/mm3      RBC 4.04 10*6/mm3      Hemoglobin 12.0 g/dL      Hematocrit 37.5 %      MCV 92.8 fL      MCH 29.7 pg      MCHC 32.0 g/dL      RDW 18.0 %      RDW-SD 61.4 fl      MPV 9.2 fL      Platelets 248 10*3/mm3      nRBC 0.5 /100 WBC     Manual Differential [[id number]]  (Normal) Collected:  05/04/19 1247    Specimen:  Blood Updated:  05/04/19 1404     Neutrophil % 64.0 %      Lymphocyte % 30.0 %      Monocyte % 5.0 %      Eosinophil % 1.0 %      Neutrophils Absolute 3.78 10*3/mm3      Lymphocytes Absolute 1.77 10*3/mm3      Monocytes Absolute 0.30 10*3/mm3      Eosinophils Absolute 0.06 10*3/mm3      RBC Morphology Normal     WBC Morphology Normal     Platelet Morphology Normal    Comprehensive Metabolic Panel [[id number]]  (Abnormal) Collected:  05/04/19 1247    Specimen:  Blood Updated:  05/04/19 1327     Glucose 123 mg/dL      BUN 14 mg/dL      Creatinine  1.08 mg/dL      Sodium 135 mmol/L      Potassium 4.0 mmol/L      Chloride 98 mmol/L      CO2 24.1 mmol/L      Calcium 9.0 mg/dL      Total Protein 6.6 g/dL      Albumin 3.50 g/dL      ALT (SGPT) 265 U/L      AST (SGOT) 160 U/L      Alkaline Phosphatase 690 U/L      Total Bilirubin 15.2 mg/dL      eGFR Non African Amer 66 mL/min/1.73      Globulin 3.1 gm/dL      A/G Ratio 1.1 g/dL      BUN/Creatinine Ratio 13.0     Anion Gap 12.9 mmol/L     Narrative:       GFR Normal >60  Chronic Kidney Disease <60  Kidney Failure <15    Lipase [[id number]]  (Abnormal) Collected:  05/04/19 1247    Specimen:  Blood Updated:  05/04/19 1326     Lipase 9 U/L     Protime-INR [[id number]]  (Normal) Collected:  05/04/19 1247    Specimen:  Blood Updated:  05/04/19 1312     Protime 12.3 Seconds      INR 0.94    aPTT [[id number]]  (Normal) Collected:  05/04/19 1247    Specimen:  Blood Updated:  05/04/19 1312     PTT 25.9 seconds                    Imaging Results (all)     Procedure Component Value Units Date/Time    XR Chest 2 View [[id number]] Collected:  05/04/19 1352     Updated:  05/04/19 1456    Narrative:       EXAMINATION: 2 VIEWS OF THE CHEST     HISTORY: 79-year-old male with history of shortness of air.     FINDINGS: PA and lateral chest radiographs were obtained. Comparison is  made to a prior chest CT dated 04/17/2019. There is minimal atelectasis  at the base of the right lung with elevation of the right hemidiaphragm  that appears stable. The lungs are otherwise clear. Areas of sclerosis  in multiple ribs and osseous structures are noted and are consistent  with known sclerotic bony metastases. Mild atheromatous calcification is  seen within the thoracic aorta. The cardiac silhouette is within normal  limits.       Impression:       Stable chest radiograph when compared to a prior chest CT  dated 04/17/2019. Stable elevation of the right hemidiaphragm with  atelectasis at the base of the right lower lobe is noted. Stable  evidence  of sclerotic bony metastases is also noted.     This report was finalized on 5/4/2019 2:53 PM by Dr. Niko Robison M.D.           Past Medical History:   Diagnosis Date   • Abdominal pain    • Anemia    • Aneurysm of ascending aorta (CMS/HCC)    • Arthritis    • Ascending cholangitis    • Aspiration pneumonia of right lower lobe (CMS/HCC)    • Biliary stricture    • Cancer (CMS/HCC)    • Chills    • Cholangitis    • Chronic adrenal insufficiency (CMS/HCC)    • CVA (cerebral vascular accident) (CMS/HCC)    • Diaphoresis    • Diarrhea    • Diverticulosis of colon    • Dizziness    • E coli infection 2017    blood   • Elevated brain natriuretic peptide (BNP) level    • Elevated cholesterol    • Elevated liver function tests    • Elevated liver function tests 4/24/2019   • Fatigue    • Fever    • Herpes labialis    • History of pneumonia     MARCH 2017   • History of transfusion    • Hyperlipidemia    • Hypertension    • Hypothyroidism    • Metastatic disease (CMS/HCC)    • PONV (postoperative nausea and vomiting)    • Prostate cancer (CMS/HCC)     with bony metastases   • Septic shock (CMS/HCC)    • Shortness of breath    • Vomiting    • Weakness        Assessment:  Active Hospital Problems    Diagnosis  POA   • **Elevated liver function tests [R94.5]  Yes   • Hyperbilirubinemia [E80.6]  Unknown   • Bacteremia due to Escherichia coli [R78.81]  Yes   • Biliary stent obstruction [T85.590A]  Yes   • Malignant neoplasm of prostate (CMS/HCC) [C61]  Yes   • Ascending cholangitis [K83.09]  Yes   • Shortness of breath [R06.02]  Yes   • Biliary stricture [K83.1]  Yes   • Acquired hypothyroidism [E03.9]  Yes   • Essential hypertension [I10]  Yes   • Mixed hyperlipidemia [E78.2]  Yes   • History of CVA (cerebrovascular accident) [Z86.73]  Not Applicable   • History of prostate cancer [Z85.46]  Not Applicable   • Malignant neoplasm metastatic to bone (CMS/HCC) [C79.51]  Yes   • Retroperitoneal lymphadenopathy [R59.0]  Yes   •  Elevated prostate specific antigen (PSA) [R97.20]  Yes      Resolved Hospital Problems   No resolved problems to display.       Plan:  The patient is admitted to hospitalist will consult GI medicine.  We will go ahead and continue with IV antibiotics for history of recent cholangitis.  He will need ERCP and probably stent exchange for obstructed biliary stent.  He looks pretty stable on admission but if things deteriorate we may need to move quicker and find someone else to do ERCP before Monday.  His regular ERCP doctor is unavailable until Monday.    Baron Desouza MD  5/4/2019  4:10 PM

## 2019-05-04 NOTE — OUTREACH NOTE
Medical Week 2 Survey      Responses   Facility patient discharged from?  [facility]   Does the patient have one of the following disease processes/diagnoses(primary or secondary)?  Other   Week 2 attempt successful?  No   Revoke  Readmitted          Alanis Sotomayor RN

## 2019-05-04 NOTE — CONSULTS
[facility], [place], operated by [facility]  Initial Inpatient Consult Note    Referring Provider: Dr. Pierre Olson    Reason for Consultation: Elevated LFTs, hyperbilirubinemia    Subjective     History of present illness:    79 y.o. male patient followed by Dr. Oscar Evangelista and last seen by him a week ago after admitted for E. coli sepsis.  At that time no concerns that he had any cholangitis.  He has had subsequent elevation in his LFTs including his bilirubin.  He had complained of nausea for the past week but attributed this to continued use of antibiotics.  No complaints of abdominal pain, denies fever or chills.  White blood cell count is stable.  CT scan of the abdomen is pending.  Advised if he needs intervention emergently would contact Dr. Brandon Gilbert, otherwise Dr. Evangelista should return on Monday.    Past Medical History:  Past Medical History:   Diagnosis Date   • Abdominal pain    • Anemia    • Aneurysm of ascending aorta (CMS/HCC)    • Arthritis    • Ascending cholangitis    • Aspiration pneumonia of right lower lobe (CMS/HCC)    • Biliary stricture    • Cancer (CMS/HCC)    • Chills    • Cholangitis    • Chronic adrenal insufficiency (CMS/HCC)    • CVA (cerebral vascular accident) (CMS/HCC)    • Diaphoresis    • Diarrhea    • Diverticulosis of colon    • Dizziness    • E coli infection 2017    blood   • Elevated brain natriuretic peptide (BNP) level    • Elevated cholesterol    • Elevated liver function tests    • Elevated liver function tests 4/24/2019   • Fatigue    • Fever    • Herpes labialis    • History of pneumonia     MARCH 2017   • History of transfusion    • Hyperlipidemia    • Hypertension    • Hypothyroidism    • Metastatic disease (CMS/HCC)    • PONV (postoperative nausea and vomiting)    • Prostate cancer (CMS/HCC)     with bony metastases   • Septic shock (CMS/HCC)    • Shortness of breath    • Vomiting    • Weakness      Past Surgical History:  Past Surgical History:   Procedure Laterality  Date   • BILE DUCT STENT PLACEMENT     • CARDIAC CATHETERIZATION     • COLONOSCOPY  unknown    normal   • EYE SURGERY  12/05/2015    cataract   • HERNIA REPAIR     • [place] ERCP DX COLLECTION SPECIMEN BRUSHING/WASHING N/A 3/24/2017    Procedure: ENDOSCOPIC RETROGRADE CHOLANGIOPANCREATOGRAPHY, STENT REMOVAL, STENT PLACEMENT, CHOLANGIOGRAM;  Surgeon: Oscar Evangelista MD;  Location: [facility] OR;  Service: Gastroenterology   • [place] ERCP DX COLLECTION SPECIMEN BRUSHING/WASHING N/A 5/1/2017    Procedure: ENDOSCOPIC RETROGRADE CHOLANGIOPANCREATOGRAPHY WITH STENT REMOVAL, SPYGLASS BIOPSIES AND WALLFLEX BILIARY STENT PLACEMENT;  Surgeon: Oscar Evangelista MD;  Location: [facility] ENDOSCOPY;  Service: Gastroenterology   • PROSTATECTOMY     • TOTAL HIP ARTHROPLASTY Right       Social History:   Social History     Tobacco Use   • Smoking status: Former Smoker     Types: Cigars   • Smokeless tobacco: Never Used   • Tobacco comment: cigar   Substance Use Topics   • Alcohol use: Yes     Comment: A GLASS OF WINE ONCE A MONTH       Family History:  Family History   Problem Relation Age of Onset   • Arthritis Mother    • Hypertension Mother    • Arthritis Father    • Cancer Father    • Heart disease Father    • Heart attack Father    • Heart disease Sister        Home Meds:  Medications Prior to Admission   Medication Sig Dispense Refill Last Dose   • aspirin 81 MG tablet Take 1 tablet by mouth Daily for 30 days. 30 tablet 0 5/4/2019 at Unknown time   • Calcium Carb-Cholecalciferol (CALCIUM-VITAMIN D3) 600-400 MG-UNIT tablet Take 2 tablets by mouth Daily.   5/3/2019 at Unknown time   • levothyroxine (SYNTHROID, LEVOTHROID) 200 MCG tablet Take 1 tablet by mouth Daily for 180 days. 90 tablet 1 5/4/2019 at Unknown time   • predniSONE (DELTASONE) 1 MG tablet Take 1 mg by mouth 2 (Two) Times a Day.   5/3/2019 at Unknown time   • telmisartan (MICARDIS) 20 MG tablet Take 1 tablet by mouth Every Night for 180 days. 90 tablet 1 5/3/2019 at  Unknown time   • Umeclidinium Bromide (INCRUSE ELLIPTA) 62.5 MCG/INH aerosol powder  Inhale 1 puff Every Morning.   5/4/2019 at Unknown time     Current Meds:      Allergies:  Allergies   Allergen Reactions   • Penicillins      Unknown, many years ago per pt when he was a child; Tolerated cephalosporins (cefepime) during 3/2017 admission.    • Statins Myalgia     Review of Systems  Pertinent items are noted in HPI, all other systems reviewed and negative     Objective     Vital Signs  Temp:  [97.5 °F (36.4 °C)-97.6 °F (36.4 °C)] 97.6 °F (36.4 °C)  Heart Rate:  [64-75] 68  Resp:  [16-18] 18  BP: (106-120)/(65-71) 120/68  Physical Exam:  General Appearance:    Alert, cooperative, in no acute distress   Head:    Normocephalic, without obvious abnormality, atraumatic   Eyes:            Lids and lashes normal, conjunctivae and scleral icterus   Throat:   No oral lesions, no thrush, oral mucosa moist   Neck:   No adenopathy, supple, trachea midline, no thyromegaly, no   carotid bruit, no JVD   Lungs:     Clear to auscultation,respirations regular, even and                   unlabored    Heart:    Regular rhythm and normal rate, normal S1 and S2, no            murmur, no gallop, no rub, no click   Chest Wall:    No abnormalities observed   Abdomen:     Normal bowel sounds, no masses, no organomegaly, soft        non-tender, non-distended, no guarding, no rebound                 tenderness   Rectal:     Deferred   Extremities:   no edema, no cyanosis, no redness   Skin:   No bleeding, bruising or rash, appears jaundiced   Lymph nodes:   No palpable adenopathy   Psychiatric:  Judgement and insight: normal   Orientation to person place and time: normal   Mood and affect: normal   Results Review:   I reviewed the patient's new clinical results.    Results from last 7 days   Lab Units 05/04/19  1247   WBC 10*3/mm3 5.90   HEMOGLOBIN g/dL 12.0*   HEMATOCRIT % 37.5   PLATELETS 10*3/mm3 248     Results from last 7 days   Lab Units  05/04/19  1247   SODIUM mmol/L 135*   POTASSIUM mmol/L 4.0   CHLORIDE mmol/L 98   CO2 mmol/L 24.1   BUN mg/dL 14   CREATININE mg/dL 1.08   CALCIUM mg/dL 9.0   BILIRUBIN mg/dL 15.2*   ALK PHOS U/L 690*   ALT (SGPT) U/L 265*   AST (SGOT) U/L 160*   GLUCOSE mg/dL 123*     Results from last 7 days   Lab Units 05/04/19  1247   INR  0.94     Lab Results   Lab Value Date/Time    LIPASE 9 (L) 05/04/2019 1247    LIPASE 20 04/24/2019 0959    LIPASE 16 03/23/2017 1747    LIPASE 20 02/19/2016 0320       Radiology:  XR Chest 2 View   Final Result   Stable chest radiograph when compared to a prior chest CT   dated 04/17/2019. Stable elevation of the right hemidiaphragm with   atelectasis at the base of the right lower lobe is noted. Stable   evidence of sclerotic bony metastases is also noted.       This report was finalized on 5/4/2019 2:53 PM by Dr. Niko Robison M.D.          CT Abdomen Pelvis With Contrast    (Results Pending)       Assessment/Plan   Patient Active Problem List   Diagnosis   • Acquired hypothyroidism   • Essential hypertension   • Mixed hyperlipidemia   • History of CVA (cerebrovascular accident)   • History of prostate cancer   • Shortness of breath   • Biliary stricture   • Ascending cholangitis   • Temporary cerebral vascular dysfunction   • Retroperitoneal lymphadenopathy   • Malignant neoplasm of prostate (CMS/HCC)   • Malignant neoplasm metastatic to bone (CMS/HCC)   • Elevated prostate specific antigen (PSA)   • Elevated brain natriuretic peptide (BNP) level   • Elevated liver function tests   • Biliary stent obstruction   • Bacteremia due to Escherichia coli   • Acute cholangitis     Impression  #1 elevated LFTs, hyperbilirubinemia: History of common bile duct stent placement on more than one occasion, last performed in May 2017.  #2 history of E. coli sepsis  #3 short of breath with known atelectasis and mucous plugging  #4 history of prostate cancer  #5 history of CVA      Recommendation  Await  CT scan results  Monitor LFTs and CBC  If concern of possible cholangitis can refer to Dr. Brandon Gilbert for emergent evaluation and treatment  I discussed the patients findings and my recommendations with patient, family and nursing staff.    Varinder Mendieta MD

## 2019-05-04 NOTE — PROGRESS NOTES
Clinical Pharmacy Services: Medication History    Ismael Ball is a 79 y.o. male presenting to [facility] for   Chief Complaint   Patient presents with   • Jaundice   • Shortness of Breath       He  has a past medical history of Abdominal pain, Anemia, Aneurysm of ascending aorta (CMS/HCC), Arthritis, Ascending cholangitis, Aspiration pneumonia of right lower lobe (CMS/HCC), Biliary stricture, Cancer (CMS/HCC), Chills, Cholangitis, Chronic adrenal insufficiency (CMS/HCC), CVA (cerebral vascular accident) (CMS/HCC), Diaphoresis, Diarrhea, Diverticulosis of colon, Dizziness, E coli infection (2017), Elevated brain natriuretic peptide (BNP) level, Elevated cholesterol, Elevated liver function tests, Elevated liver function tests (4/24/2019), Fatigue, Fever, Herpes labialis, History of pneumonia, History of transfusion, Hyperlipidemia, Hypertension, Hypothyroidism, Metastatic disease (CMS/HCC), PONV (postoperative nausea and vomiting), Prostate cancer (CMS/HCC), Septic shock (CMS/HCC), Shortness of breath, Vomiting, and Weakness.    Allergies as of 05/04/2019 - Reviewed 05/04/2019   Allergen Reaction Noted   • Penicillins  02/18/2016   • Statins Myalgia 08/21/2013       Medication information was obtained from: Patient and family members  Pharmacy and Phone Number: [facility] [phone]    Prior to Admission Medications     Prescriptions Last Dose Informant Patient Reported? Taking?    aspirin 81 MG tablet 5/4/2019 Self No Yes    Take 1 tablet by mouth Daily for 30 days.    Calcium Carb-Cholecalciferol (CALCIUM-VITAMIN D3) 600-400 MG-UNIT tablet 5/3/2019 Self Yes Yes    Take 2 tablets by mouth Daily.    levothyroxine (SYNTHROID, LEVOTHROID) 200 MCG tablet 5/4/2019 Self No Yes    Take 1 tablet by mouth Daily for 180 days.    predniSONE (DELTASONE) 1 MG tablet 5/3/2019 Self Yes Yes    Take 1 mg by mouth 2 (Two) Times a Day.    telmisartan (MICARDIS) 20 MG tablet 5/3/2019 Self No Yes    Take 1 tablet by  mouth Every Night for 180 days.    Umeclidinium Bromide (INCRUSE ELLIPTA) 62.5 MCG/INH aerosol powder  5/4/2019 Self Yes Yes    Inhale 1 puff Every Morning.            Medication notes: Removed ipratropium, Xtandi, and levofloxacin per patient.    This medication list is complete to the best of my knowledge as of 5/4/2019    Please call if questions.    Clayton Key, pharmacy intern.

## 2019-05-05 LAB
ALBUMIN SERPL-MCNC: 2.9 G/DL (ref 3.5–5.2)
ALBUMIN/GLOB SERPL: 1 G/DL
ALP SERPL-CCNC: 642 U/L (ref 39–117)
ALT SERPL W P-5'-P-CCNC: 248 U/L (ref 1–41)
ANION GAP SERPL CALCULATED.3IONS-SCNC: 8.7 MMOL/L
AST SERPL-CCNC: 200 U/L (ref 1–40)
BILIRUB SERPL-MCNC: 14.6 MG/DL (ref 0.2–1.2)
BUN BLD-MCNC: 14 MG/DL (ref 8–23)
BUN/CREAT SERPL: 15.7 (ref 7–25)
CALCIUM SPEC-SCNC: 8.2 MG/DL (ref 8.6–10.5)
CHLORIDE SERPL-SCNC: 103 MMOL/L (ref 98–107)
CO2 SERPL-SCNC: 22.3 MMOL/L (ref 22–29)
CREAT BLD-MCNC: 0.89 MG/DL (ref 0.76–1.27)
DEPRECATED RDW RBC AUTO: 61.9 FL (ref 37–54)
EOSINOPHIL # BLD MANUAL: 0.12 10*3/MM3 (ref 0–0.4)
EOSINOPHIL NFR BLD MANUAL: 2 % (ref 0.3–6.2)
ERYTHROCYTE [DISTWIDTH] IN BLOOD BY AUTOMATED COUNT: 18.4 % (ref 12.3–15.4)
GFR SERPL CREATININE-BSD FRML MDRD: 82 ML/MIN/1.73
GLOBULIN UR ELPH-MCNC: 2.8 GM/DL
GLUCOSE BLD-MCNC: 120 MG/DL (ref 65–99)
HCT VFR BLD AUTO: 33.9 % (ref 37.5–51)
HGB BLD-MCNC: 10.9 G/DL (ref 13–17.7)
LYMPHOCYTES # BLD MANUAL: 1.53 10*3/MM3 (ref 0.7–3.1)
LYMPHOCYTES NFR BLD MANUAL: 25 % (ref 19.6–45.3)
LYMPHOCYTES NFR BLD MANUAL: 5 % (ref 5–12)
MCH RBC QN AUTO: 29.6 PG (ref 26.6–33)
MCHC RBC AUTO-ENTMCNC: 32.2 G/DL (ref 31.5–35.7)
MCV RBC AUTO: 92.1 FL (ref 79–97)
MONOCYTES # BLD AUTO: 0.31 10*3/MM3 (ref 0.1–0.9)
NEUTROPHILS # BLD AUTO: 4.16 10*3/MM3 (ref 1.7–7)
NEUTROPHILS NFR BLD MANUAL: 68 % (ref 42.7–76)
NRBC BLD AUTO-RTO: 0.3 /100 WBC (ref 0–0.2)
PLAT MORPH BLD: NORMAL
PLATELET # BLD AUTO: 265 10*3/MM3 (ref 140–450)
PMV BLD AUTO: 10.2 FL (ref 6–12)
POTASSIUM BLD-SCNC: 4.7 MMOL/L (ref 3.5–5.2)
PROT SERPL-MCNC: 5.7 G/DL (ref 6–8.5)
RBC # BLD AUTO: 3.68 10*6/MM3 (ref 4.14–5.8)
RBC MORPH BLD: NORMAL
SODIUM BLD-SCNC: 134 MMOL/L (ref 136–145)
WBC MORPH BLD: NORMAL
WBC NRBC COR # BLD: 6.12 10*3/MM3 (ref 3.4–10.8)

## 2019-05-05 PROCEDURE — 80053 COMPREHEN METABOLIC PANEL: CPT | Performed by: HOSPITALIST

## 2019-05-05 PROCEDURE — 85007 BL SMEAR W/DIFF WBC COUNT: CPT | Performed by: HOSPITALIST

## 2019-05-05 PROCEDURE — 25010000002 ONDANSETRON PER 1 MG: Performed by: HOSPITALIST

## 2019-05-05 PROCEDURE — 25010000003 CEFTRIAXONE PER 250 MG: Performed by: HOSPITALIST

## 2019-05-05 PROCEDURE — 63710000001 PREDNISONE PER 5 MG: Performed by: HOSPITALIST

## 2019-05-05 PROCEDURE — 99232 SBSQ HOSP IP/OBS MODERATE 35: CPT | Performed by: INTERNAL MEDICINE

## 2019-05-05 PROCEDURE — 85025 COMPLETE CBC W/AUTO DIFF WBC: CPT | Performed by: HOSPITALIST

## 2019-05-05 RX ADMIN — LEVOTHYROXINE SODIUM 200 MCG: 100 TABLET ORAL at 07:30

## 2019-05-05 RX ADMIN — SODIUM CHLORIDE 100 ML/HR: 9 INJECTION, SOLUTION INTRAVENOUS at 05:19

## 2019-05-05 RX ADMIN — ONDANSETRON 4 MG: 2 INJECTION INTRAMUSCULAR; INTRAVENOUS at 13:20

## 2019-05-05 RX ADMIN — CEFTRIAXONE SODIUM 2 G: 2 INJECTION, SOLUTION INTRAVENOUS at 14:42

## 2019-05-05 RX ADMIN — PREDNISONE 1 MG: 1 TABLET ORAL at 08:15

## 2019-05-05 RX ADMIN — PREDNISONE 1 MG: 1 TABLET ORAL at 17:55

## 2019-05-05 RX ADMIN — SODIUM CHLORIDE 100 ML/HR: 9 INJECTION, SOLUTION INTRAVENOUS at 17:52

## 2019-05-05 RX ADMIN — ONDANSETRON 4 MG: 2 INJECTION INTRAMUSCULAR; INTRAVENOUS at 05:24

## 2019-05-05 NOTE — PROGRESS NOTES
[facility]  Inpatient Progress Note    Reason for Follow Up: Elevated LFTs, hyperbilirubinemia    Subjective     Interval History: Discussed with patient, patient's family.  Complains of nausea, otherwise no GI related complaints.  CT scan reveals intrahepatic ductal dilation, question of stent patency.  Labs relatively static.  Explained these issues to the patient and his wife, advised will have Dr. Evangelista reassess him tomorrow and consider possible stent clearance or replacement.      Current Facility-Administered Medications:   •  acetaminophen (TYLENOL) tablet 650 mg, 650 mg, Oral, Q4H PRN, Baron Desouza MD  •  cefTRIAXone (ROCEPHIN) IVPB 2 g, 2 g, Intravenous, Q24H, Baron Desouza MD, Last Rate: 100 mL/hr at 05/05/19 1442, 2 g at 05/05/19 1442  •  ipratropium-albuterol (DUO-NEB) nebulizer solution 3 mL, 3 mL, Nebulization, Q4H PRN, Baron Desouza MD  •  levothyroxine (SYNTHROID, LEVOTHROID) tablet 200 mcg, 200 mcg, Oral, Q AM, Baron Desouza MD, 200 mcg at 05/05/19 0730  •  ondansetron (ZOFRAN) tablet 4 mg, 4 mg, Oral, Q6H PRN **OR** ondansetron (ZOFRAN) injection 4 mg, 4 mg, Intravenous, Q6H PRN, Baron Desouza MD, 4 mg at 05/05/19 1320  •  predniSONE (DELTASONE) tablet 1 mg, 1 mg, Oral, Daily With Breakfast & Dinner, Baron Desouza MD, 1 mg at 05/05/19 0815  •  [COMPLETED] Insert peripheral IV, , , Once **AND** sodium chloride 0.9 % flush 10 mL, 10 mL, Intravenous, PRN, Manuel Lopez MD  •  sodium chloride 0.9 % flush 3 mL, 3 mL, Intravenous, Q12H, Baron Desouza MD, 3 mL at 05/04/19 1902  •  sodium chloride 0.9 % flush 3-10 mL, 3-10 mL, Intravenous, PRN, Baron Desouza MD  •  sodium chloride 0.9 % infusion, 100 mL/hr, Intravenous, Continuous, Baron Desouza MD, Last Rate: 100 mL/hr at 05/05/19 0519, 100 mL/hr at 05/05/19 0519  Review of Systems:    All systems were reviewed and negative except for:  Gastrointestinal: positive for  See HPI    Objective     Vital  Signs  Temp:  [97.3 °F (36.3 °C)-98.5 °F (36.9 °C)] 97.3 °F (36.3 °C)  Heart Rate:  [66-83] 66  Resp:  [16-18] 16  BP: ()/(44-62) 95/55  Body mass index is 25.25 kg/m².    Intake/Output Summary (Last 24 hours) at 5/5/2019 1629  Last data filed at 5/5/2019 1335  Gross per 24 hour   Intake 1719 ml   Output --   Net 1719 ml     I/O this shift:  In: 360 [P.O.:360]  Out: -      Physical Exam:   General: patient awake, alert and cooperative   Eyes: Normal lids and lashes, no scleral icterus   Neck: supple, normal ROM   Skin: warm and dry, not jaundiced   Cardiovascular: regular rhythm and rate, no murmurs auscultated   Pulm: clear to auscultation bilaterally, regular and unlabored   Abdomen: soft, nontender, nondistended; normal bowel sounds   Rectal: deferred   Extremities: no rash or edema   Psychiatric: Normal mood and behavior; memory intact     Results Review:     I reviewed the patient's new clinical results.    Results from last 7 days   Lab Units 05/05/19  0447 05/04/19  1247   WBC 10*3/mm3 6.12 5.90   HEMOGLOBIN g/dL 10.9* 12.0*   HEMATOCRIT % 33.9* 37.5   PLATELETS 10*3/mm3 265 248     Results from last 7 days   Lab Units 05/05/19  0447 05/04/19  1247   SODIUM mmol/L 134* 135*   POTASSIUM mmol/L 4.7 4.0   CHLORIDE mmol/L 103 98   CO2 mmol/L 22.3 24.1   BUN mg/dL 14 14   CREATININE mg/dL 0.89 1.08   CALCIUM mg/dL 8.2* 9.0   BILIRUBIN mg/dL 14.6* 15.2*   ALK PHOS U/L 642* 690*   ALT (SGPT) U/L 248* 265*   AST (SGOT) U/L 200* 160*   GLUCOSE mg/dL 120* 123*     Results from last 7 days   Lab Units 05/04/19  1247   INR  0.94     Lab Results   Lab Value Date/Time    LIPASE 9 (L) 05/04/2019 1247    LIPASE 20 04/24/2019 0959    LIPASE 16 03/23/2017 1747    LIPASE 20 02/19/2016 0320       Radiology:  CT Abdomen Pelvis With Contrast   Final Result   1. There has been interval increase in intrahepatic bile duct dilatation   since the prior examination. The common bile duct stent is stable in   position. The patency  of the stent, however, cannot be established on   this examination.   2. Stable osseous metastases.   3. Stable adrenal metastases.   4. Severe atheromatous calcification of the abdominal aorta.       Radiation dose reduction techniques were utilized, including automated   exposure control and exposure modulation based on body size.       This report was finalized on 5/4/2019 7:04 PM by Dr. Niko Robison M.D.          XR Chest 2 View   Final Result   Stable chest radiograph when compared to a prior chest CT   dated 04/17/2019. Stable elevation of the right hemidiaphragm with   atelectasis at the base of the right lower lobe is noted. Stable   evidence of sclerotic bony metastases is also noted.       This report was finalized on 5/4/2019 2:53 PM by Dr. Niko Robison M.D.              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 coli sepsis  #4 history of prostate cancer  #5 history of CVA      Recommendation  We will request Dr. Evangelista to see patient and assess in a.m.  Continue current medical regimen  Monitor LFTs      I  discussed the patients findings and my recommendations with patient and family.    Varinder Mendieta MD

## 2019-05-05 NOTE — PLAN OF CARE
Problem: Patient Care Overview  Goal: Plan of Care Review  Outcome: Ongoing (interventions implemented as appropriate)   05/05/19 0648   Coping/Psychosocial   Plan of Care Reviewed With patient   Plan of Care Review   Progress no change   OTHER   Outcome Summary VSS. C/O SOA DURING NIGHT WITH SAT 96% ON RA, APPLIED O2 AT 2L/M N/C AS REQUESTED FOR COMFORT & THEN HE  WENT TO SLEEP. LATER C/O NAUSEA & MEDICATED WITH ZOFRAN WITH RELIEF.       Problem: Fall Risk (Adult)  Goal: Absence of Fall  Outcome: Ongoing (interventions implemented as appropriate)      Problem: Nausea/Vomiting (Adult)  Goal: Symptom Relief  Outcome: Ongoing (interventions implemented as appropriate)      Problem: Liver Failure, Acute/Chronic (Adult)  Goal: Signs and Symptoms of Listed Potential Problems Will be Absent, Minimized or Managed (Liver Failure, Acute/Chronic)  Outcome: Ongoing (interventions implemented as appropriate)

## 2019-05-05 NOTE — PROGRESS NOTES
"DAILY PROGRESS NOTE  [facility]    Patient Identification:  Name: Ismael Ball  Age: 79 y.o.  Sex: male  :  1940  MRN: [id number]         Primary Care Physician: Pierre Olson MD    Subjective:  Interval History:He complains of nausea.    Objective:    Scheduled Meds:  ceftriaxone 2 g Intravenous Q24H   levothyroxine 200 mcg Oral Q AM   predniSONE 1 mg Oral Daily With Breakfast & Dinner   sodium chloride 3 mL Intravenous Q12H     Continuous Infusions:  sodium chloride 100 mL/hr Last Rate: 100 mL/hr (19)       Vital signs in last 24 hours:  Temp:  [97.3 °F (36.3 °C)-98.5 °F (36.9 °C)] 97.3 °F (36.3 °C)  Heart Rate:  [64-83] 78  Resp:  [16-18] 16  BP: ()/(44-68) 95/55    Intake/Output:    Intake/Output Summary (Last 24 hours) at 2019 1413  Last data filed at 2019 1335  Gross per 24 hour   Intake 1769 ml   Output --   Net 1769 ml       Exam:  BP 95/55 (BP Location: Right arm, Patient Position: Sitting)   Pulse 78   Temp 97.3 °F (36.3 °C) (Oral)   Resp 16   Ht 177.8 cm (70\")   Wt 79.8 kg (176 lb)   SpO2 95%   BMI 25.25 kg/m²     General Appearance:    Alert, cooperative, no distress   Head:    Normocephalic, without obvious abnormality, atraumatic   Eyes:    jaundiced   Throat:   Lips, tongue, gums normal   Neck:   Supple, symmetrical, trachea midline, no JVD   Lungs:     Clear to auscultation bilaterally, respirations unlabored   Chest Wall:    No tenderness or deformity    Heart:    Regular rate and rhythm, S1 and S2 normal, no murmur,no  Rub or gallop   Abdomen:     Soft, non-tender, bowel sounds active, no masses, no organomegaly    Extremities:   Extremities normal, atraumatic, no cyanosis or edema   Pulses:      Skin:   Skin is warm and dry,  no rashes or palpable lesions   Neurologic:   no focal deficits noted      Lab Results (last 72 hours)     Procedure Component Value Units Date/Time    Comprehensive Metabolic Panel [[id number]]  (Abnormal) Collected: "  05/05/19 0447    Specimen:  Blood Updated:  05/05/19 0633     Glucose 120 mg/dL      BUN 14 mg/dL      Creatinine 0.89 mg/dL      Sodium 134 mmol/L      Potassium 4.7 mmol/L      Chloride 103 mmol/L      CO2 22.3 mmol/L      Calcium 8.2 mg/dL      Total Protein 5.7 g/dL      Albumin 2.90 g/dL      ALT (SGPT) 248 U/L      AST (SGOT) 200 U/L      Alkaline Phosphatase 642 U/L      Total Bilirubin 14.6 mg/dL      eGFR Non African Amer 82 mL/min/1.73      Globulin 2.8 gm/dL      A/G Ratio 1.0 g/dL      BUN/Creatinine Ratio 15.7     Anion Gap 8.7 mmol/L     Narrative:       GFR Normal >60  Chronic Kidney Disease <60  Kidney Failure <15    CBC Auto Differential [[id number]]  (Abnormal) Collected:  05/05/19 0447    Specimen:  Blood Updated:  05/05/19 0610     WBC 6.12 10*3/mm3      RBC 3.68 10*6/mm3      Hemoglobin 10.9 g/dL      Hematocrit 33.9 %      MCV 92.1 fL      MCH 29.6 pg      MCHC 32.2 g/dL      RDW 18.4 %      RDW-SD 61.9 fl      MPV 10.2 fL      Platelets 265 10*3/mm3      nRBC 0.3 /100 WBC     Manual Differential [[id number]]  (Normal) Collected:  05/05/19 0447    Specimen:  Blood Updated:  05/05/19 0610     Neutrophil % 68.0 %      Lymphocyte % 25.0 %      Monocyte % 5.0 %      Eosinophil % 2.0 %      Neutrophils Absolute 4.16 10*3/mm3      Lymphocytes Absolute 1.53 10*3/mm3      Monocytes Absolute 0.31 10*3/mm3      Eosinophils Absolute 0.12 10*3/mm3      RBC Morphology Normal     WBC Morphology Normal     Platelet Morphology Normal    Hepatitis Panel, Acute [[id number]]  (Normal) Collected:  05/04/19 1345    Specimen:  Blood Updated:  05/04/19 1453     Hepatitis B Surface Ag Non-Reactive     Hep A IgM Non-Reactive     Hep B C IgM Non-Reactive     Hepatitis C Ab Non-Reactive    CBC & Differential [[id number]] Collected:  05/04/19 1247    Specimen:  Blood Updated:  05/04/19 1404    Narrative:       The following orders were created for panel order CBC & Differential.  Procedure                                Abnormality         Status                     ---------                               -----------         ------                     CBC Auto Differential[[id number]]        Abnormal            Final result                 Please view results for these tests on the individual orders.    CBC Auto Differential [[id number]]  (Abnormal) Collected:  05/04/19 1247    Specimen:  Blood Updated:  05/04/19 1404     WBC 5.90 10*3/mm3      RBC 4.04 10*6/mm3      Hemoglobin 12.0 g/dL      Hematocrit 37.5 %      MCV 92.8 fL      MCH 29.7 pg      MCHC 32.0 g/dL      RDW 18.0 %      RDW-SD 61.4 fl      MPV 9.2 fL      Platelets 248 10*3/mm3      nRBC 0.5 /100 WBC     Manual Differential [[id number]]  (Normal) Collected:  05/04/19 1247    Specimen:  Blood Updated:  05/04/19 1404     Neutrophil % 64.0 %      Lymphocyte % 30.0 %      Monocyte % 5.0 %      Eosinophil % 1.0 %      Neutrophils Absolute 3.78 10*3/mm3      Lymphocytes Absolute 1.77 10*3/mm3      Monocytes Absolute 0.30 10*3/mm3      Eosinophils Absolute 0.06 10*3/mm3      RBC Morphology Normal     WBC Morphology Normal     Platelet Morphology Normal    Comprehensive Metabolic Panel [[id number]]  (Abnormal) Collected:  05/04/19 1247    Specimen:  Blood Updated:  05/04/19 1327     Glucose 123 mg/dL      BUN 14 mg/dL      Creatinine 1.08 mg/dL      Sodium 135 mmol/L      Potassium 4.0 mmol/L      Chloride 98 mmol/L      CO2 24.1 mmol/L      Calcium 9.0 mg/dL      Total Protein 6.6 g/dL      Albumin 3.50 g/dL      ALT (SGPT) 265 U/L      AST (SGOT) 160 U/L      Alkaline Phosphatase 690 U/L      Total Bilirubin 15.2 mg/dL      eGFR Non African Amer 66 mL/min/1.73      Globulin 3.1 gm/dL      A/G Ratio 1.1 g/dL      BUN/Creatinine Ratio 13.0     Anion Gap 12.9 mmol/L     Narrative:       GFR Normal >60  Chronic Kidney Disease <60  Kidney Failure <15    Lipase [[id number]]  (Abnormal) Collected:  05/04/19 1247    Specimen:  Blood Updated:  05/04/19 1326     Lipase 9 U/L      Protime-INR [[id number]]  (Normal) Collected:  05/04/19 1247    Specimen:  Blood Updated:  05/04/19 1312     Protime 12.3 Seconds      INR 0.94    aPTT [[id number]]  (Normal) Collected:  05/04/19 1247    Specimen:  Blood Updated:  05/04/19 1312     PTT 25.9 seconds         Data Review:  Results from last 7 days   Lab Units 05/05/19  0447 05/04/19  1247   SODIUM mmol/L 134* 135*   POTASSIUM mmol/L 4.7 4.0   CHLORIDE mmol/L 103 98   CO2 mmol/L 22.3 24.1   BUN mg/dL 14 14   CREATININE mg/dL 0.89 1.08   GLUCOSE mg/dL 120* 123*   CALCIUM mg/dL 8.2* 9.0     Results from last 7 days   Lab Units 05/05/19  0447 05/04/19  1247   WBC 10*3/mm3 6.12 5.90   HEMOGLOBIN g/dL 10.9* 12.0*   HEMATOCRIT % 33.9* 37.5   PLATELETS 10*3/mm3 265 248             Lab Results   Lab Value Date/Time    TROPONINT <0.010 07/30/2018 1053    TROPONINT 0.031 (H) 03/22/2017 1718         Results from last 7 days   Lab Units 05/05/19  0447 05/04/19  1247   ALK PHOS U/L 642* 690*   BILIRUBIN mg/dL 14.6* 15.2*   ALT (SGPT) U/L 248* 265*   AST (SGOT) U/L 200* 160*             No results found for: POCGLU  Results from last 7 days   Lab Units 05/04/19  1247   INR  0.94       Past Medical History:   Diagnosis Date   • Abdominal pain    • Anemia    • Aneurysm of ascending aorta (CMS/HCC)    • Arthritis    • Ascending cholangitis    • Aspiration pneumonia of right lower lobe (CMS/HCC)    • Biliary stricture    • Cancer (CMS/HCC)    • Chills    • Cholangitis    • Chronic adrenal insufficiency (CMS/HCC)    • CVA (cerebral vascular accident) (CMS/HCC)    • Diaphoresis    • Diarrhea    • Diverticulosis of colon    • Dizziness    • E coli infection 2017    blood   • Elevated brain natriuretic peptide (BNP) level    • Elevated cholesterol    • Elevated liver function tests    • Elevated liver function tests 4/24/2019   • Fatigue    • Fever    • Herpes labialis    • History of pneumonia     MARCH 2017   • History of transfusion    • Hyperlipidemia    • Hypertension     • Hypothyroidism    • Metastatic disease (CMS/HCC)    • PONV (postoperative nausea and vomiting)    • Prostate cancer (CMS/HCC)     with bony metastases   • Septic shock (CMS/HCC)    • Shortness of breath    • Vomiting    • Weakness        Assessment:  Active Hospital Problems    Diagnosis  POA   • **Elevated liver function tests [R94.5]  Yes   • Hyperbilirubinemia [E80.6]  Unknown   • Bacteremia due to Escherichia coli [R78.81]  Yes   • Biliary stent obstruction [T85.590A]  Yes   • Malignant neoplasm of prostate (CMS/HCC) [C61]  Yes   • Ascending cholangitis [K83.09]  Yes   • Shortness of breath [R06.02]  Yes   • Biliary stricture [K83.1]  Yes   • Acquired hypothyroidism [E03.9]  Yes   • Essential hypertension [I10]  Yes   • Mixed hyperlipidemia [E78.2]  Yes   • History of CVA (cerebrovascular accident) [Z86.73]  Not Applicable   • History of prostate cancer [Z85.46]  Not Applicable   • 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       Plan:   Continue with antibiotics and ERCP sometime. Follow up lab and continue antibiotics.    Baron Desouza MD  5/5/2019  2:13 PM

## 2019-05-05 NOTE — PLAN OF CARE
Problem: Patient Care Overview  Goal: Plan of Care Review  Outcome: Ongoing (interventions implemented as appropriate)   05/05/19 9172   Coping/Psychosocial   Plan of Care Reviewed With patient   Plan of Care Review   Progress no change   OTHER   Outcome Summary Pt remains very juandiced. C/O some lower quad discomfort .Plan for Dr. Evangelista to see in am and decide on plan of action.     Goal: Individualization and Mutuality  Outcome: Ongoing (interventions implemented as appropriate)    Goal: Discharge Needs Assessment  Outcome: Ongoing (interventions implemented as appropriate)    Goal: Interprofessional Rounds/Family Conf  Outcome: Ongoing (interventions implemented as appropriate)      Problem: Fall Risk (Adult)  Goal: Absence of Fall  Outcome: Ongoing (interventions implemented as appropriate)      Problem: Nausea/Vomiting (Adult)  Goal: Symptom Relief  Outcome: Ongoing (interventions implemented as appropriate)    Goal: Adequate Hydration  Outcome: Ongoing (interventions implemented as appropriate)      Problem: Liver Failure, Acute/Chronic (Adult)  Goal: Signs and Symptoms of Listed Potential Problems Will be Absent, Minimized or Managed (Liver Failure, Acute/Chronic)  Outcome: Ongoing (interventions implemented as appropriate)

## 2019-05-06 ENCOUNTER — APPOINTMENT (OUTPATIENT)
Dept: GENERAL RADIOLOGY | Facility: HOSPITAL | Age: 79
End: 2019-05-06

## 2019-05-06 ENCOUNTER — ANESTHESIA EVENT (OUTPATIENT)
Dept: GASTROENTEROLOGY | Facility: HOSPITAL | Age: 79
End: 2019-05-06

## 2019-05-06 ENCOUNTER — ANESTHESIA (OUTPATIENT)
Dept: GASTROENTEROLOGY | Facility: HOSPITAL | Age: 79
End: 2019-05-06

## 2019-05-06 LAB
ALBUMIN SERPL-MCNC: 2.7 G/DL (ref 3.5–5.2)
ALBUMIN/GLOB SERPL: 1.1 G/DL
ALP SERPL-CCNC: 613 U/L (ref 39–117)
ALT SERPL W P-5'-P-CCNC: 227 U/L (ref 1–41)
ANION GAP SERPL CALCULATED.3IONS-SCNC: 8.6 MMOL/L
AST SERPL-CCNC: 170 U/L (ref 1–40)
BASOPHILS # BLD AUTO: 0.03 10*3/MM3 (ref 0–0.2)
BASOPHILS NFR BLD AUTO: 0.6 % (ref 0–1.5)
BILIRUB SERPL-MCNC: 13.8 MG/DL (ref 0.2–1.2)
BUN BLD-MCNC: 8 MG/DL (ref 8–23)
BUN/CREAT SERPL: 12.5 (ref 7–25)
CALCIUM SPEC-SCNC: 7.6 MG/DL (ref 8.6–10.5)
CHLORIDE SERPL-SCNC: 105 MMOL/L (ref 98–107)
CO2 SERPL-SCNC: 25.4 MMOL/L (ref 22–29)
CREAT BLD-MCNC: 0.64 MG/DL (ref 0.76–1.27)
DEPRECATED RDW RBC AUTO: 64.1 FL (ref 37–54)
EOSINOPHIL # BLD AUTO: 0.17 10*3/MM3 (ref 0–0.4)
EOSINOPHIL NFR BLD AUTO: 3.2 % (ref 0.3–6.2)
ERYTHROCYTE [DISTWIDTH] IN BLOOD BY AUTOMATED COUNT: 18.7 % (ref 12.3–15.4)
GFR SERPL CREATININE-BSD FRML MDRD: 121 ML/MIN/1.73
GLOBULIN UR ELPH-MCNC: 2.5 GM/DL
GLUCOSE BLD-MCNC: 101 MG/DL (ref 65–99)
HCT VFR BLD AUTO: 32.1 % (ref 37.5–51)
HGB BLD-MCNC: 10.2 G/DL (ref 13–17.7)
IMM GRANULOCYTES # BLD AUTO: 0.03 10*3/MM3 (ref 0–0.05)
IMM GRANULOCYTES NFR BLD AUTO: 0.6 % (ref 0–0.5)
LYMPHOCYTES # BLD AUTO: 1.19 10*3/MM3 (ref 0.7–3.1)
LYMPHOCYTES NFR BLD AUTO: 22.5 % (ref 19.6–45.3)
MCH RBC QN AUTO: 29.7 PG (ref 26.6–33)
MCHC RBC AUTO-ENTMCNC: 31.8 G/DL (ref 31.5–35.7)
MCV RBC AUTO: 93.6 FL (ref 79–97)
MONOCYTES # BLD AUTO: 0.26 10*3/MM3 (ref 0.1–0.9)
MONOCYTES NFR BLD AUTO: 4.9 % (ref 5–12)
NEUTROPHILS # BLD AUTO: 3.6 10*3/MM3 (ref 1.7–7)
NEUTROPHILS NFR BLD AUTO: 68.2 % (ref 42.7–76)
NRBC BLD AUTO-RTO: 0.2 /100 WBC (ref 0–0.2)
PLATELET # BLD AUTO: 273 10*3/MM3 (ref 140–450)
PMV BLD AUTO: 10.6 FL (ref 6–12)
POTASSIUM BLD-SCNC: 4.2 MMOL/L (ref 3.5–5.2)
PROT SERPL-MCNC: 5.2 G/DL (ref 6–8.5)
RBC # BLD AUTO: 3.43 10*6/MM3 (ref 4.14–5.8)
SODIUM BLD-SCNC: 139 MMOL/L (ref 136–145)
WBC NRBC COR # BLD: 5.28 10*3/MM3 (ref 3.4–10.8)

## 2019-05-06 PROCEDURE — 80053 COMPREHEN METABOLIC PANEL: CPT | Performed by: HOSPITALIST

## 2019-05-06 PROCEDURE — 63710000001 PREDNISONE PER 5 MG: Performed by: HOSPITALIST

## 2019-05-06 PROCEDURE — C1769 GUIDE WIRE: HCPCS | Performed by: INTERNAL MEDICINE

## 2019-05-06 PROCEDURE — 25010000003 CEFTRIAXONE PER 250 MG: Performed by: HOSPITALIST

## 2019-05-06 PROCEDURE — 25010000002 IOPAMIDOL 61 % SOLUTION: Performed by: INTERNAL MEDICINE

## 2019-05-06 PROCEDURE — 0FC98ZZ EXTIRPATION OF MATTER FROM COMMON BILE DUCT, VIA NATURAL OR ARTIFICIAL OPENING ENDOSCOPIC: ICD-10-PCS | Performed by: INTERNAL MEDICINE

## 2019-05-06 PROCEDURE — 25010000002 PROPOFOL 10 MG/ML EMULSION: Performed by: ANESTHESIOLOGY

## 2019-05-06 PROCEDURE — 85025 COMPLETE CBC W/AUTO DIFF WBC: CPT | Performed by: HOSPITALIST

## 2019-05-06 PROCEDURE — BF101ZZ FLUOROSCOPY OF BILE DUCTS USING LOW OSMOLAR CONTRAST: ICD-10-PCS | Performed by: INTERNAL MEDICINE

## 2019-05-06 PROCEDURE — 43264 ERCP REMOVE DUCT CALCULI: CPT | Performed by: INTERNAL MEDICINE

## 2019-05-06 PROCEDURE — 74328 X-RAY BILE DUCT ENDOSCOPY: CPT

## 2019-05-06 RX ORDER — PROPOFOL 10 MG/ML
VIAL (ML) INTRAVENOUS CONTINUOUS PRN
Status: DISCONTINUED | OUTPATIENT
Start: 2019-05-06 | End: 2019-05-06 | Stop reason: SURG

## 2019-05-06 RX ORDER — SODIUM CHLORIDE 9 MG/ML
1000 INJECTION, SOLUTION INTRAVENOUS CONTINUOUS
Status: DISCONTINUED | OUTPATIENT
Start: 2019-05-06 | End: 2019-05-07 | Stop reason: HOSPADM

## 2019-05-06 RX ORDER — LIDOCAINE HYDROCHLORIDE 10 MG/ML
0.5 INJECTION, SOLUTION INFILTRATION; PERINEURAL ONCE AS NEEDED
Status: DISCONTINUED | OUTPATIENT
Start: 2019-05-06 | End: 2019-05-06 | Stop reason: HOSPADM

## 2019-05-06 RX ORDER — PROPOFOL 10 MG/ML
VIAL (ML) INTRAVENOUS AS NEEDED
Status: DISCONTINUED | OUTPATIENT
Start: 2019-05-06 | End: 2019-05-06 | Stop reason: SURG

## 2019-05-06 RX ORDER — LIDOCAINE HYDROCHLORIDE 20 MG/ML
INJECTION, SOLUTION INFILTRATION; PERINEURAL AS NEEDED
Status: DISCONTINUED | OUTPATIENT
Start: 2019-05-06 | End: 2019-05-06 | Stop reason: SURG

## 2019-05-06 RX ORDER — SODIUM CHLORIDE 0.9 % (FLUSH) 0.9 %
3 SYRINGE (ML) INJECTION AS NEEDED
Status: DISCONTINUED | OUTPATIENT
Start: 2019-05-06 | End: 2019-05-06 | Stop reason: HOSPADM

## 2019-05-06 RX ADMIN — PREDNISONE 1 MG: 1 TABLET ORAL at 17:32

## 2019-05-06 RX ADMIN — PROPOFOL 100 MG: 10 INJECTION, EMULSION INTRAVENOUS at 14:32

## 2019-05-06 RX ADMIN — LEVOTHYROXINE SODIUM 200 MCG: 100 TABLET ORAL at 06:42

## 2019-05-06 RX ADMIN — PROPOFOL 100 MCG/KG/MIN: 10 INJECTION, EMULSION INTRAVENOUS at 14:32

## 2019-05-06 RX ADMIN — CEFTRIAXONE SODIUM 2 G: 2 INJECTION, SOLUTION INTRAVENOUS at 17:30

## 2019-05-06 RX ADMIN — PREDNISONE 1 MG: 1 TABLET ORAL at 07:57

## 2019-05-06 RX ADMIN — SODIUM CHLORIDE 1000 ML: 9 INJECTION, SOLUTION INTRAVENOUS at 20:44

## 2019-05-06 RX ADMIN — SODIUM CHLORIDE 100 ML/HR: 9 INJECTION, SOLUTION INTRAVENOUS at 04:29

## 2019-05-06 RX ADMIN — LIDOCAINE HYDROCHLORIDE 60 MG: 20 INJECTION, SOLUTION INFILTRATION; PERINEURAL at 14:32

## 2019-05-06 RX ADMIN — SODIUM CHLORIDE 1000 ML: 9 INJECTION, SOLUTION INTRAVENOUS at 13:44

## 2019-05-06 RX ADMIN — SODIUM CHLORIDE, PRESERVATIVE FREE 3 ML: 5 INJECTION INTRAVENOUS at 20:45

## 2019-05-06 NOTE — PLAN OF CARE
Problem: Patient Care Overview  Goal: Plan of Care Review  Outcome: Ongoing (interventions implemented as appropriate)      Problem: Fall Risk (Adult)  Goal: Absence of Fall  Outcome: Ongoing (interventions implemented as appropriate)      Problem: Nausea/Vomiting (Adult)  Goal: Symptom Relief  Outcome: Ongoing (interventions implemented as appropriate)      Problem: Liver Failure, Acute/Chronic (Adult)  Goal: Signs and Symptoms of Listed Potential Problems Will be Absent, Minimized or Managed (Liver Failure, Acute/Chronic)  Outcome: Ongoing (interventions implemented as appropriate)

## 2019-05-06 NOTE — PLAN OF CARE
"Problem: Patient Care Overview  Goal: Plan of Care Review  Outcome: Ongoing (interventions implemented as appropriate)   05/06/19 2255   Coping/Psychosocial   Plan of Care Reviewed With patient;spouse;daughter   OTHER   Outcome Summary VSS. ERCP today and opened stent. States feels \"much better\". Taking soup, fluids, pudding for evening meal. IVFs continue. Safety maintained. continue to monitor.     Goal: Individualization and Mutuality  Outcome: Ongoing (interventions implemented as appropriate)    Goal: Discharge Needs Assessment  Outcome: Ongoing (interventions implemented as appropriate)    Goal: Interprofessional Rounds/Family Conf  Outcome: Ongoing (interventions implemented as appropriate)      Problem: Fall Risk (Adult)  Goal: Absence of Fall  Outcome: Ongoing (interventions implemented as appropriate)      Problem: Nausea/Vomiting (Adult)  Goal: Symptom Relief  Outcome: Ongoing (interventions implemented as appropriate)    Goal: Adequate Hydration  Outcome: Ongoing (interventions implemented as appropriate)      Problem: Liver Failure, Acute/Chronic (Adult)  Goal: Signs and Symptoms of Listed Potential Problems Will be Absent, Minimized or Managed (Liver Failure, Acute/Chronic)  Outcome: Ongoing (interventions implemented as appropriate)        "

## 2019-05-06 NOTE — PLAN OF CARE
Problem: Patient Care Overview  Goal: Plan of Care Review  Outcome: Ongoing (interventions implemented as appropriate)   05/06/19 6621   OTHER   Outcome Summary VSS. RESTED WELL OVERNIGHT WITHOUT C/O'S.

## 2019-05-06 NOTE — BRIEF OP NOTE
ENDOSCOPIC RETROGRADE CHOLANGIOPANCREATOGRAPHY  Progress Note    Ismael Ball  5/4/2019 - 5/6/2019    Pre-op Diagnosis:   Biliary stricture [K83.1]       Post-Op Diagnosis Codes:     * Biliary stricture [K83.1]     * Biliary stent obstruction [T85.590A]    Procedure/CPT® Codes:      Procedure(s):  ENDOSCOPIC RETROGRADE CHOLANGIOPANCREATOGRAPHY WITH BALLOON SWEEP    Surgeon(s):  Oscar Evangelista MD    Anesthesia: Monitor Anesthesia Care    Staff:   Radiology Technologist: Varinder Shetty  Endo Technician: Alyssia Alva  Endo Nurse: Echo Tom RN    Estimated Blood Loss: none    Urine Voided: * No values recorded between 5/6/2019  2:25 PM and 5/6/2019  2:51 PM *    Specimens:                None          Drains:      Findings: ERCP revealed stent in place obstructed with bilious material.  Cannulation with wire guide and autotome revealed debris throughout the entire length of stent.  Balloon sweep displaced proximal edge of the stent slightly distally but stent remains patent in good position.  Balloon sweep of entire stent cleared obstruction.  Good bile flow was seen.  Stent left in place and scope removed.    Complications: None      Oscar Evangelista MD     Date: 5/6/2019  Time: 2:52 PM

## 2019-05-06 NOTE — ANESTHESIA PREPROCEDURE EVALUATION
Anesthesia Evaluation     Patient summary reviewed and Nursing notes reviewed   history of anesthetic complications: PONV               Airway   Mallampati: III  Dental      Pulmonary    (+) pneumonia , a smoker Former, shortness of breath,   Cardiovascular     ECG reviewed  Rhythm: regular  Rate: normal    (+) hypertension, past MI , CAD, PVD, hyperlipidemia,       Neuro/Psych  (+) TIA, CVA, dizziness/light headedness, weakness,     GI/Hepatic/Renal/Endo    (+)   liver disease history of elevated LFT, hypothyroidism,     Musculoskeletal     Abdominal    Substance History - negative use     OB/GYN negative ob/gyn ROS         Other   (+) arthritis   history of cancer                    Anesthesia Plan    ASA 3     MAC     intravenous induction   Anesthetic plan, all risks, benefits, and alternatives have been provided, discussed and informed consent has been obtained with: patient.

## 2019-05-06 NOTE — ANESTHESIA POSTPROCEDURE EVALUATION
Patient: Ismael Ball    Procedure Summary     Date:  05/06/19 Room / Location:  [facility] END[facility]COPY 6 /  [facility]    Anesthesia Start:  1431 Anesthesia Stop:  1504    Procedure:  ENDOSCOPIC RETROGRADE CHOLANGIOPANCREATOGRAPHY WITH BALLOON SWEEP (N/A ) Diagnosis:       Biliary stricture      Biliary stent obstruction      (Biliary stricture [K83.1])    Surgeon:  Oscar Evangelista MD Provider:  Nancy Hernandes MD    Anesthesia Type:  MAC ASA Status:  3          Anesthesia Type: MAC  Last vitals  BP   (!) 83/50 (05/06/19 1501)   Temp   36.8 °C (98.2 °F) (05/06/19 1340)   Pulse   62 (05/06/19 1501)   Resp   16 (05/06/19 1501)     SpO2   97 % (05/06/19 1501)     Post Anesthesia Care and Evaluation    Patient location during evaluation: bedside  Patient participation: complete - patient participated  Level of consciousness: awake  Pain management: adequate  Airway patency: patent  Anesthetic complications: No anesthetic complications    Cardiovascular status: acceptable  Respiratory status: acceptable  Hydration status: acceptable

## 2019-05-06 NOTE — PROGRESS NOTES
"DAILY PROGRESS NOTE  [facility]    Patient Identification:  Name: Ismael Ball  Age: 79 y.o.  Sex: male  :  1940  MRN: [id number]         Primary Care Physician: Pierre Olson MD    Subjective:  Interval History:He complains of nausea.    Objective:    Scheduled Meds:    ceftriaxone 2 g Intravenous Q24H   levothyroxine 200 mcg Oral Q AM   predniSONE 1 mg Oral Daily With Breakfast & Dinner   sodium chloride 3 mL Intravenous Q12H     Continuous Infusions:    sodium chloride 100 mL/hr Last Rate: 100 mL/hr (19 0429)       Vital signs in last 24 hours:  Temp:  [97.2 °F (36.2 °C)-98.7 °F (37.1 °C)] 97.2 °F (36.2 °C)  Heart Rate:  [65-78] 65  Resp:  [16-17] 17  BP: ()/(55-91) 97/60    Intake/Output:    Intake/Output Summary (Last 24 hours) at 2019 1221  Last data filed at 2019 0820  Gross per 24 hour   Intake 9 ml   Output --   Net  ml       Exam:  BP 97/60 (BP Location: Left arm, Patient Position: Lying)   Pulse 65   Temp 97.2 °F (36.2 °C) (Oral)   Resp 17   Ht 177.8 cm (70\")   Wt 79.8 kg (176 lb)   SpO2 98%   BMI 25.25 kg/m²     General Appearance:    Alert, cooperative, no distress   Head:    Normocephalic, without obvious abnormality, atraumatic   Eyes:    jaundiced   Throat:   Lips, tongue, gums normal   Neck:   Supple, symmetrical, trachea midline, no JVD   Lungs:     Clear to auscultation bilaterally, respirations unlabored   Chest Wall:    No tenderness or deformity    Heart:    Regular rate and rhythm, S1 and S2 normal, no murmur,no  Rub or gallop   Abdomen:     Soft, non-tender, bowel sounds active, no masses, no organomegaly    Extremities:   Extremities normal, atraumatic, no cyanosis or edema   Pulses:      Skin:   Skin is warm and dry,  no rashes or palpable lesions   Neurologic:   no focal deficits noted      Lab Results (last 72 hours)     Procedure Component Value Units Date/Time    Comprehensive Metabolic Panel [[id number]]  (Abnormal) " Collected:  05/05/19 0447    Specimen:  Blood Updated:  05/05/19 0633     Glucose 120 mg/dL      BUN 14 mg/dL      Creatinine 0.89 mg/dL      Sodium 134 mmol/L      Potassium 4.7 mmol/L      Chloride 103 mmol/L      CO2 22.3 mmol/L      Calcium 8.2 mg/dL      Total Protein 5.7 g/dL      Albumin 2.90 g/dL      ALT (SGPT) 248 U/L      AST (SGOT) 200 U/L      Alkaline Phosphatase 642 U/L      Total Bilirubin 14.6 mg/dL      eGFR Non African Amer 82 mL/min/1.73      Globulin 2.8 gm/dL      A/G Ratio 1.0 g/dL      BUN/Creatinine Ratio 15.7     Anion Gap 8.7 mmol/L     Narrative:       GFR Normal >60  Chronic Kidney Disease <60  Kidney Failure <15    CBC Auto Differential [[id number]]  (Abnormal) Collected:  05/05/19 0447    Specimen:  Blood Updated:  05/05/19 0610     WBC 6.12 10*3/mm3      RBC 3.68 10*6/mm3      Hemoglobin 10.9 g/dL      Hematocrit 33.9 %      MCV 92.1 fL      MCH 29.6 pg      MCHC 32.2 g/dL      RDW 18.4 %      RDW-SD 61.9 fl      MPV 10.2 fL      Platelets 265 10*3/mm3      nRBC 0.3 /100 WBC     Manual Differential [[id number]]  (Normal) Collected:  05/05/19 0447    Specimen:  Blood Updated:  05/05/19 0610     Neutrophil % 68.0 %      Lymphocyte % 25.0 %      Monocyte % 5.0 %      Eosinophil % 2.0 %      Neutrophils Absolute 4.16 10*3/mm3      Lymphocytes Absolute 1.53 10*3/mm3      Monocytes Absolute 0.31 10*3/mm3      Eosinophils Absolute 0.12 10*3/mm3      RBC Morphology Normal     WBC Morphology Normal     Platelet Morphology Normal    Hepatitis Panel, Acute [[id number]]  (Normal) Collected:  05/04/19 1345    Specimen:  Blood Updated:  05/04/19 1453     Hepatitis B Surface Ag Non-Reactive     Hep A IgM Non-Reactive     Hep B C IgM Non-Reactive     Hepatitis C Ab Non-Reactive    CBC & Differential [[id number]] Collected:  05/04/19 1247    Specimen:  Blood Updated:  05/04/19 1404    Narrative:       The following orders were created for panel order CBC & Differential.  Procedure                                Abnormality         Status                     ---------                               -----------         ------                     CBC Auto Differential[[id number]]        Abnormal            Final result                 Please view results for these tests on the individual orders.    CBC Auto Differential [[id number]]  (Abnormal) Collected:  05/04/19 1247    Specimen:  Blood Updated:  05/04/19 1404     WBC 5.90 10*3/mm3      RBC 4.04 10*6/mm3      Hemoglobin 12.0 g/dL      Hematocrit 37.5 %      MCV 92.8 fL      MCH 29.7 pg      MCHC 32.0 g/dL      RDW 18.0 %      RDW-SD 61.4 fl      MPV 9.2 fL      Platelets 248 10*3/mm3      nRBC 0.5 /100 WBC     Manual Differential [[id number]]  (Normal) Collected:  05/04/19 1247    Specimen:  Blood Updated:  05/04/19 1404     Neutrophil % 64.0 %      Lymphocyte % 30.0 %      Monocyte % 5.0 %      Eosinophil % 1.0 %      Neutrophils Absolute 3.78 10*3/mm3      Lymphocytes Absolute 1.77 10*3/mm3      Monocytes Absolute 0.30 10*3/mm3      Eosinophils Absolute 0.06 10*3/mm3      RBC Morphology Normal     WBC Morphology Normal     Platelet Morphology Normal    Comprehensive Metabolic Panel [[id number]]  (Abnormal) Collected:  05/04/19 1247    Specimen:  Blood Updated:  05/04/19 1327     Glucose 123 mg/dL      BUN 14 mg/dL      Creatinine 1.08 mg/dL      Sodium 135 mmol/L      Potassium 4.0 mmol/L      Chloride 98 mmol/L      CO2 24.1 mmol/L      Calcium 9.0 mg/dL      Total Protein 6.6 g/dL      Albumin 3.50 g/dL      ALT (SGPT) 265 U/L      AST (SGOT) 160 U/L      Alkaline Phosphatase 690 U/L      Total Bilirubin 15.2 mg/dL      eGFR Non African Amer 66 mL/min/1.73      Globulin 3.1 gm/dL      A/G Ratio 1.1 g/dL      BUN/Creatinine Ratio 13.0     Anion Gap 12.9 mmol/L     Narrative:       GFR Normal >60  Chronic Kidney Disease <60  Kidney Failure <15    Lipase [[id number]]  (Abnormal) Collected:  05/04/19 1247    Specimen:  Blood Updated:  05/04/19 1326     Lipase 9 U/L      Protime-INR [[id number]]  (Normal) Collected:  05/04/19 1247    Specimen:  Blood Updated:  05/04/19 1312     Protime 12.3 Seconds      INR 0.94    aPTT [[id number]]  (Normal) Collected:  05/04/19 1247    Specimen:  Blood Updated:  05/04/19 1312     PTT 25.9 seconds         Data Review:  Results from last 7 days   Lab Units 05/06/19 0449 05/05/19 0447 05/04/19  1247   SODIUM mmol/L 139 134* 135*   POTASSIUM mmol/L 4.2 4.7 4.0   CHLORIDE mmol/L 105 103 98   CO2 mmol/L 25.4 22.3 24.1   BUN mg/dL 8 14 14   CREATININE mg/dL 0.64* 0.89 1.08   GLUCOSE mg/dL 101* 120* 123*   CALCIUM mg/dL 7.6* 8.2* 9.0     Results from last 7 days   Lab Units 05/06/19 0449 05/05/19 0447 05/04/19  1247   WBC 10*3/mm3 5.28 6.12 5.90   HEMOGLOBIN g/dL 10.2* 10.9* 12.0*   HEMATOCRIT % 32.1* 33.9* 37.5   PLATELETS 10*3/mm3 273 265 248             Lab Results   Lab Value Date/Time    TROPONINT <0.010 07/30/2018 1053    TROPONINT 0.031 (H) 03/22/2017 1718         Results from last 7 days   Lab Units 05/06/19 0449 05/05/19 0447 05/04/19  1247   ALK PHOS U/L 613* 642* 690*   BILIRUBIN mg/dL 13.8* 14.6* 15.2*   ALT (SGPT) U/L 227* 248* 265*   AST (SGOT) U/L 170* 200* 160*             No results found for: POCGLU  Results from last 7 days   Lab Units 05/04/19  1247   INR  0.94       Past Medical History:   Diagnosis Date   • Abdominal pain    • Anemia    • Aneurysm of ascending aorta (CMS/HCC)    • Arthritis    • Ascending cholangitis    • Aspiration pneumonia of right lower lobe (CMS/HCC)    • Biliary stricture    • Cancer (CMS/HCC)    • Chills    • Cholangitis    • Chronic adrenal insufficiency (CMS/HCC)    • CVA (cerebral vascular accident) (CMS/HCC)    • Diaphoresis    • Diarrhea    • Diverticulosis of colon    • Dizziness    • E coli infection 2017    blood   • Elevated brain natriuretic peptide (BNP) level    • Elevated cholesterol    • Elevated liver function tests    • Elevated liver function tests 4/24/2019   • Fatigue    • Fever    •  Herpes labialis    • History of pneumonia     MARCH 2017   • History of transfusion    • Hyperlipidemia    • Hypertension    • Hypothyroidism    • Metastatic disease (CMS/HCC)    • PONV (postoperative nausea and vomiting)    • Prostate cancer (CMS/HCC)     with bony metastases   • Septic shock (CMS/HCC)    • Shortness of breath    • Vomiting    • Weakness        Assessment:  Active Hospital Problems    Diagnosis  POA   • **Elevated liver function tests [R94.5]  Yes   • Hyperbilirubinemia [E80.6]  Unknown   • Bacteremia due to Escherichia coli [R78.81]  Yes   • Biliary stent obstruction [T85.590A]  Yes   • Malignant neoplasm of prostate (CMS/HCC) [C61]  Yes   • Ascending cholangitis [K83.09]  Yes   • Shortness of breath [R06.02]  Yes   • Biliary stricture [K83.1]  Yes   • Acquired hypothyroidism [E03.9]  Yes   • Essential hypertension [I10]  Yes   • Mixed hyperlipidemia [E78.2]  Yes   • History of CVA (cerebrovascular accident) [Z86.73]  Not Applicable   • History of prostate cancer [Z85.46]  Not Applicable   • 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       Plan:   Continue with antibiotics and ERCP today. Follow up lab and continue antibiotics.    Baron Desouza MD  5/6/2019  12:21 PM

## 2019-05-07 VITALS
BODY MASS INDEX: 25.2 KG/M2 | HEART RATE: 63 BPM | RESPIRATION RATE: 17 BRPM | HEIGHT: 70 IN | WEIGHT: 176 LBS | SYSTOLIC BLOOD PRESSURE: 109 MMHG | TEMPERATURE: 97.4 F | DIASTOLIC BLOOD PRESSURE: 67 MMHG | OXYGEN SATURATION: 100 %

## 2019-05-07 LAB
ALBUMIN SERPL-MCNC: 2.7 G/DL (ref 3.5–5.2)
ALBUMIN/GLOB SERPL: 1 G/DL
ALP SERPL-CCNC: 562 U/L (ref 39–117)
ALT SERPL W P-5'-P-CCNC: 180 U/L (ref 1–41)
ANION GAP SERPL CALCULATED.3IONS-SCNC: 7.8 MMOL/L
AST SERPL-CCNC: 94 U/L (ref 1–40)
BASOPHILS # BLD AUTO: 0.04 10*3/MM3 (ref 0–0.2)
BASOPHILS NFR BLD AUTO: 0.7 % (ref 0–1.5)
BILIRUB SERPL-MCNC: 5 MG/DL (ref 0.2–1.2)
BUN BLD-MCNC: 6 MG/DL (ref 8–23)
BUN/CREAT SERPL: 13 (ref 7–25)
CALCIUM SPEC-SCNC: 7.6 MG/DL (ref 8.6–10.5)
CHLORIDE SERPL-SCNC: 100 MMOL/L (ref 98–107)
CO2 SERPL-SCNC: 26.2 MMOL/L (ref 22–29)
CREAT BLD-MCNC: 0.46 MG/DL (ref 0.76–1.27)
DEPRECATED RDW RBC AUTO: 63.8 FL (ref 37–54)
EOSINOPHIL # BLD AUTO: 0.16 10*3/MM3 (ref 0–0.4)
EOSINOPHIL NFR BLD AUTO: 2.9 % (ref 0.3–6.2)
ERYTHROCYTE [DISTWIDTH] IN BLOOD BY AUTOMATED COUNT: 18.6 % (ref 12.3–15.4)
GFR SERPL CREATININE-BSD FRML MDRD: >150 ML/MIN/1.73
GLOBULIN UR ELPH-MCNC: 2.6 GM/DL
GLUCOSE BLD-MCNC: 98 MG/DL (ref 65–99)
HCT VFR BLD AUTO: 30.8 % (ref 37.5–51)
HGB BLD-MCNC: 9.8 G/DL (ref 13–17.7)
IMM GRANULOCYTES # BLD AUTO: 0.04 10*3/MM3 (ref 0–0.05)
IMM GRANULOCYTES NFR BLD AUTO: 0.7 % (ref 0–0.5)
LYMPHOCYTES # BLD AUTO: 1.7 10*3/MM3 (ref 0.7–3.1)
LYMPHOCYTES NFR BLD AUTO: 31.1 % (ref 19.6–45.3)
MCH RBC QN AUTO: 29.8 PG (ref 26.6–33)
MCHC RBC AUTO-ENTMCNC: 31.8 G/DL (ref 31.5–35.7)
MCV RBC AUTO: 93.6 FL (ref 79–97)
MONOCYTES # BLD AUTO: 0.25 10*3/MM3 (ref 0.1–0.9)
MONOCYTES NFR BLD AUTO: 4.6 % (ref 5–12)
NEUTROPHILS # BLD AUTO: 3.28 10*3/MM3 (ref 1.7–7)
NEUTROPHILS NFR BLD AUTO: 60 % (ref 42.7–76)
NRBC BLD AUTO-RTO: 0.2 /100 WBC (ref 0–0.2)
PLATELET # BLD AUTO: 276 10*3/MM3 (ref 140–450)
PMV BLD AUTO: 10.3 FL (ref 6–12)
POTASSIUM BLD-SCNC: 4.1 MMOL/L (ref 3.5–5.2)
PROT SERPL-MCNC: 5.3 G/DL (ref 6–8.5)
RBC # BLD AUTO: 3.29 10*6/MM3 (ref 4.14–5.8)
SODIUM BLD-SCNC: 134 MMOL/L (ref 136–145)
WBC NRBC COR # BLD: 5.47 10*3/MM3 (ref 3.4–10.8)

## 2019-05-07 PROCEDURE — 63710000001 PREDNISONE PER 5 MG: Performed by: HOSPITALIST

## 2019-05-07 PROCEDURE — 85025 COMPLETE CBC W/AUTO DIFF WBC: CPT | Performed by: HOSPITALIST

## 2019-05-07 PROCEDURE — 80053 COMPREHEN METABOLIC PANEL: CPT | Performed by: HOSPITALIST

## 2019-05-07 RX ADMIN — LEVOTHYROXINE SODIUM 200 MCG: 100 TABLET ORAL at 06:40

## 2019-05-07 RX ADMIN — PREDNISONE 1 MG: 1 TABLET ORAL at 10:05

## 2019-05-07 RX ADMIN — SODIUM CHLORIDE, PRESERVATIVE FREE 3 ML: 5 INJECTION INTRAVENOUS at 10:05

## 2019-05-07 NOTE — PROGRESS NOTES
Case Management Discharge Note    Final Note: Home denies needs    Destination      No service has been selected for the patient.      Durable Medical Equipment      No service has been selected for the patient.      Dialysis/Infusion      No service has been selected for the patient.      Home Medical Care      No service has been selected for the patient.      Therapy      No service has been selected for the patient.      Community Resources      No service has been selected for the patient.             Final Discharge Disposition Code: 01 - home or self-care

## 2019-05-07 NOTE — PLAN OF CARE
Problem: Patient Care Overview  Goal: Plan of Care Review  Outcome: Ongoing (interventions implemented as appropriate)   05/07/19 0440   Coping/Psychosocial   Plan of Care Reviewed With patient   Plan of Care Review   Progress improving   OTHER   Outcome Summary Pt had ERCP today. No c/o nausea this shift. IVF continue.        Problem: Nausea/Vomiting (Adult)  Goal: Symptom Relief  Outcome: Ongoing (interventions implemented as appropriate)      Problem: Liver Failure, Acute/Chronic (Adult)  Goal: Signs and Symptoms of Listed Potential Problems Will be Absent, Minimized or Managed (Liver Failure, Acute/Chronic)  Outcome: Ongoing (interventions implemented as appropriate)

## 2019-05-07 NOTE — DISCHARGE SUMMARY
PHYSICIAN DISCHARGE SUMMARY                                                                        [facility]    Patient Identification:  Name: Ismael Ball  Age: 79 y.o.  Sex: male  :  1940  MRN: [id number]  Primary Care Physician: Pierre Olson MD    Admit date: 2019  Discharge date and time:2019  Discharged Condition: good    Discharge Diagnoses:  Active Hospital Problems    Diagnosis  POA   • **Elevated liver function tests [R94.5]  Yes   • Hyperbilirubinemia [E80.6]  Unknown   • Bacteremia due to Escherichia coli [R78.81]  Yes   • Biliary stent obstruction [T85.590A]  Yes   • Malignant neoplasm of prostate (CMS/HCC) [C61]  Yes   • Ascending cholangitis [K83.09]  Yes   • Shortness of breath [R06.02]  Yes   • Biliary stricture [K83.1]  Yes   • Acquired hypothyroidism [E03.9]  Yes   • Essential hypertension [I10]  Yes   • Mixed hyperlipidemia [E78.2]  Yes   • History of CVA (cerebrovascular accident) [Z86.73]  Not Applicable   • History of prostate cancer [Z85.46]  Not Applicable   • 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          PMHX:   Past Medical History:   Diagnosis Date   • Abdominal pain    • Anemia    • Aneurysm of ascending aorta (CMS/HCC)    • Arthritis    • Ascending cholangitis    • Aspiration pneumonia of right lower lobe (CMS/HCC)    • Biliary stricture    • Cancer (CMS/HCC)    • Chills    • Cholangitis    • Chronic adrenal insufficiency (CMS/HCC)    • CVA (cerebral vascular accident) (CMS/HCC)    • Diaphoresis    • Diarrhea    • Diverticulosis of colon    • Dizziness    • E coli infection 2017    blood   • Elevated brain natriuretic peptide (BNP) level    • Elevated cholesterol    • Elevated liver  function tests    • Elevated liver function tests 4/24/2019   • Fatigue    • Fever    • Herpes labialis    • History of pneumonia     MARCH 2017   • History of transfusion    • Hyperlipidemia    • Hypertension    • Hypothyroidism    • Metastatic disease (CMS/HCC)    • PONV (postoperative nausea and vomiting)    • Prostate cancer (CMS/HCC)     with bony metastases   • Septic shock (CMS/HCC)    • Shortness of breath    • Vomiting    • Weakness      PSHX:   Past Surgical History:   Procedure Laterality Date   • BILE DUCT STENT PLACEMENT     • CARDIAC CATHETERIZATION     • COLONOSCOPY  unknown    normal   • ERCP N/A 5/6/2019    Procedure: ENDOSCOPIC RETROGRADE CHOLANGIOPANCREATOGRAPHY WITH BALLOON SWEEP;  Surgeon: Oscar Evangelista MD;  Location: [facility] ENDOSCOPY;  Service: Gastroenterology   • EYE SURGERY  12/05/2015    cataract   • HERNIA REPAIR     • [place] ERCP DX COLLECTION SPECIMEN BRUSHING/WASHING N/A 3/24/2017    Procedure: ENDOSCOPIC RETROGRADE CHOLANGIOPANCREATOGRAPHY, STENT REMOVAL, STENT PLACEMENT, CHOLANGIOGRAM;  Surgeon: Oscar Evangelista MD;  Location: [facility] OR;  Service: Gastroenterology   • NJ ERCP DX COLLECTION SPECIMEN BRUSHING/WASHING N/A 5/1/2017    Procedure: ENDOSCOPIC RETROGRADE CHOLANGIOPANCREATOGRAPHY WITH STENT REMOVAL, SPYGLASS BIOPSIES AND WALLFLEX BILIARY STENT PLACEMENT;  Surgeon: Oscar Evangelista MD;  Location: [facility] ENDOSCOPY;  Service: Gastroenterology   • PROSTATECTOMY     • TOTAL HIP ARTHROPLASTY Right        Hospital Course: Ismael Ball  is a 79-year-old white male with history of anemia, arthritis, recent admission for cholangitis with history of biliary stent, prostate cancer with bone metastases, chronic adrenal insufficiency, previous stroke, hyperlipidemia and hypothyroidism who recently been in the hospital with cholangitis and bacteremia with E. coli had been treated outpatient with Levaquin and subsequently finished antibiotics in the last day or so. He noticed  increasing jaundice over the last couple days and was worse today. He had some nausea but no vomiting. He has had some mild abdominal discomfort but nothing really severe. He noticed that his color was markedly more jaundiced than it had been and he came to the emergency room for further evaluation treatment. He has not had any fever or chills. The patient was started on some IV antibiotics and admitted for further evaluation treatment of jaundice with history of biliary stent and recent admission for cholangitis and E. coli bacteremia.          The patient was admitted to the hospital and seen by GI medicine.  He underwent ERCP and had sweeping of his stent and removal of debris.  His bilirubin and liver tests were improving after the procedure and he was feeling much better and much less jaundiced.  His bilirubin dropped from 15 to about 5.  He felt well enough to go home and he will follow-up with his primary care doctor in 1 week for continuing care.    Consults:     Consults     Date and Time Order Name Status Description    5/5/2019 1410 Inpatient Gastroenterology Consult      5/4/2019 1410 Gastroenterology (on-call MD unless specified) Completed     5/4/2019 1358 [facility] (on-call MD unless specified) Completed     4/25/2019 0956 Inpatient Infectious Diseases Consult Completed     4/24/2019 1538 Inpatient Gastroenterology Consult Completed     4/24/2019 1359 Gastroenterology (on-call MD unless specified) Completed     4/24/2019 1147 [facility] (on-call MD unless specified) Completed         Results from last 7 days   Lab Units 05/07/19  0355   WBC 10*3/mm3 5.47   HEMOGLOBIN g/dL 9.8*   HEMATOCRIT % 30.8*   PLATELETS 10*3/mm3 276     Results from last 7 days   Lab Units 05/07/19  0355   SODIUM mmol/L 134*   POTASSIUM mmol/L 4.1   CHLORIDE mmol/L 100   CO2 mmol/L 26.2   BUN mg/dL 6*   CREATININE mg/dL 0.46*   GLUCOSE mg/dL 98   CALCIUM mg/dL 7.6*     Significant Diagnostic Studies:   Lab Results   Component Value Date     WBC 5.47 05/07/2019    HGB 9.8 (L) 05/07/2019    HCT 30.8 (L) 05/07/2019     05/07/2019     Lab Results   Component Value Date     (L) 05/07/2019    K 4.1 05/07/2019     05/07/2019    CO2 26.2 05/07/2019    BUN 6 (L) 05/07/2019    CREATININE 0.46 (L) 05/07/2019    GLUCOSE 98 05/07/2019     Lab Results   Component Value Date    CALCIUM 7.6 (L) 05/07/2019     Lab Results   Component Value Date    AST 94 (H) 05/07/2019     (H) 05/07/2019    ALKPHOS 562 (H) 05/07/2019     No results found for: APTT, INR  No results found for: COLORU, CLARITYU, SPECGRAV, PHUR, PROTEINUR, GLUCOSEU, KETONESU, BLOODU, NITRITE, LEUKOCYTESUR, BILIRUBINUR, UROBILINOGEN, RBCUA, WBCUA, BACTERIA, UACOMMENT  No results found for: TROPONINT, TROPONINI, BNP  No components found for: HGBA1C;2  No components found for: TSH;2  Imaging Results (all)     Procedure Component Value Units Date/Time    FL ercp biliary duct only [[id number]] Collected:  05/06/19 1508     Updated:  05/06/19 1513    Narrative:       FL ERCP BILIARY DUCT ONLY-     INDICATIONS: Bile duct obstruction, ERCP     TECHNIQUE: FLUOROSCOPIC ASSISTANCE IN THE OPERATING ROOM.     FINDINGS:     7 intraoperative fluoroscopic spot views were obtained and recorded the  PACS for review, revealing biliary stent, ERCP, opacification of central  intrahepatic biliary ducts, placement of a new common biliary duct  stent, partial opacification of duodenum. Please see operative report  for full details.     Fluoroscopy time: 5 minutes, 26 seconds       Impression:          As described.     This report was finalized on 5/6/2019 3:10 PM by Dr. Ryan Kaiser M.D.       CT Abdomen Pelvis With Contrast [[id number]] Collected:  05/04/19 1735     Updated:  05/04/19 1907    Narrative:       EXAMINATION: CT OF THE ABDOMEN AND PELVIS WITH CONTRAST     HISTORY: 79-year-old male with history of jaundice and elevated liver  function tests and bilirubin     TECHNIQUE: Contiguous axial  images were obtained through the abdomen and  pelvis following intravenous administration of nonionic contrast. Oral  contrast was not administered.     COMPARISON: CT of the abdomen and pelvis with contrast, 04/24/2019     FINDINGS: There is consolidation at the base of the right lower lobe  that appear stable. The spleen and pancreas have a normal appearance.  There is a stable 3.1 x 3.0 cm left adrenal mass. A 2.1 x 1.4 cm stable  right adrenal lesion is also noted.     Since the prior examination there has been an interval increase in the  degree of intrahepatic ductal dilatation. A bile duct extending to the  right lobe of the liver measures 1.4 cm in diameter compared to 1.1 cm  previously. A stent within the common bile duct is stable.     Scattered diverticulosis is seen throughout the descending and  rectosigmoid colon. A normal appendix is visualized. There has been no  significant interval change in the osseous metastases of the lumbar  spine and pelvis. Severe atheromatous calcification is seen throughout  the abdominal aorta.       Impression:       1. There has been interval increase in intrahepatic bile duct dilatation  since the prior examination. The common bile duct stent is stable in  position. The patency of the stent, however, cannot be established on  this examination.  2. Stable osseous metastases.  3. Stable adrenal metastases.  4. Severe atheromatous calcification of the abdominal aorta.     Radiation dose reduction techniques were utilized, including automated  exposure control and exposure modulation based on body size.     This report was finalized on 5/4/2019 7:04 PM by Dr. Niko Robison M.D.       XR Chest 2 View [[id number]] Collected:  05/04/19 1352     Updated:  05/04/19 1456    Narrative:       EXAMINATION: 2 VIEWS OF THE CHEST     HISTORY: 79-year-old male with history of shortness of air.     FINDINGS: PA and lateral chest radiographs were obtained. Comparison is  made to a  prior chest CT dated 04/17/2019. There is minimal atelectasis  at the base of the right lung with elevation of the right hemidiaphragm  that appears stable. The lungs are otherwise clear. Areas of sclerosis  in multiple ribs and osseous structures are noted and are consistent  with known sclerotic bony metastases. Mild atheromatous calcification is  seen within the thoracic aorta. The cardiac silhouette is within normal  limits.       Impression:       Stable chest radiograph when compared to a prior chest CT  dated 04/17/2019. Stable elevation of the right hemidiaphragm with  atelectasis at the base of the right lower lobe is noted. Stable  evidence of sclerotic bony metastases is also noted.     This report was finalized on 5/4/2019 2:53 PM by Dr. Niko Robison M.D.           Lab Results (last 7 days)     Procedure Component Value Units Date/Time    Comprehensive Metabolic Panel [[id number]]  (Abnormal) Collected:  05/07/19 0355    Specimen:  Blood from Arm, Left Updated:  05/07/19 0504     Glucose 98 mg/dL      BUN 6 mg/dL      Creatinine 0.46 mg/dL      Sodium 134 mmol/L      Potassium 4.1 mmol/L      Chloride 100 mmol/L      CO2 26.2 mmol/L      Calcium 7.6 mg/dL      Total Protein 5.3 g/dL      Albumin 2.70 g/dL      ALT (SGPT) 180 U/L      AST (SGOT) 94 U/L      Alkaline Phosphatase 562 U/L      Total Bilirubin 5.0 mg/dL      eGFR Non African Amer >150 mL/min/1.73      Globulin 2.6 gm/dL      A/G Ratio 1.0 g/dL      BUN/Creatinine Ratio 13.0     Anion Gap 7.8 mmol/L     Narrative:       GFR Normal >60  Chronic Kidney Disease <60  Kidney Failure <15    CBC & Differential [[id number]] Collected:  05/07/19 0355    Specimen:  Blood Updated:  05/07/19 0439    Narrative:       The following orders were created for panel order CBC & Differential.  Procedure                               Abnormality         Status                     ---------                               -----------         ------                      CBC Auto Differential[[id number]]        Abnormal            Final result                 Please view results for these tests on the individual orders.    CBC Auto Differential [[id number]]  (Abnormal) Collected:  05/07/19 0355    Specimen:  Blood from Arm, Left Updated:  05/07/19 0439     WBC 5.47 10*3/mm3      RBC 3.29 10*6/mm3      Hemoglobin 9.8 g/dL      Hematocrit 30.8 %      MCV 93.6 fL      MCH 29.8 pg      MCHC 31.8 g/dL      RDW 18.6 %      RDW-SD 63.8 fl      MPV 10.3 fL      Platelets 276 10*3/mm3      Neutrophil % 60.0 %      Lymphocyte % 31.1 %      Monocyte % 4.6 %      Eosinophil % 2.9 %      Basophil % 0.7 %      Immature Grans % 0.7 %      Neutrophils, Absolute 3.28 10*3/mm3      Lymphocytes, Absolute 1.70 10*3/mm3      Monocytes, Absolute 0.25 10*3/mm3      Eosinophils, Absolute 0.16 10*3/mm3      Basophils, Absolute 0.04 10*3/mm3      Immature Grans, Absolute 0.04 10*3/mm3      nRBC 0.2 /100 WBC     Comprehensive Metabolic Panel [[id number]]  (Abnormal) Collected:  05/06/19 0449    Specimen:  Blood Updated:  05/06/19 0627     Glucose 101 mg/dL      BUN 8 mg/dL      Creatinine 0.64 mg/dL      Sodium 139 mmol/L      Potassium 4.2 mmol/L      Chloride 105 mmol/L      CO2 25.4 mmol/L      Calcium 7.6 mg/dL      Total Protein 5.2 g/dL      Albumin 2.70 g/dL      ALT (SGPT) 227 U/L      AST (SGOT) 170 U/L      Alkaline Phosphatase 613 U/L      Total Bilirubin 13.8 mg/dL      eGFR Non African Amer 121 mL/min/1.73      Globulin 2.5 gm/dL      A/G Ratio 1.1 g/dL      BUN/Creatinine Ratio 12.5     Anion Gap 8.6 mmol/L     Narrative:       GFR Normal >60  Chronic Kidney Disease <60  Kidney Failure <15    CBC & Differential [[id number]] Collected:  05/06/19 0449    Specimen:  Blood Updated:  05/06/19 0605    Narrative:       The following orders were created for panel order CBC & Differential.  Procedure                               Abnormality         Status                     ---------                                -----------         ------                     CBC Auto Differential[[id number]]        Abnormal            Final result                 Please view results for these tests on the individual orders.    CBC Auto Differential [[id number]]  (Abnormal) Collected:  05/06/19 0449    Specimen:  Blood Updated:  05/06/19 0605     WBC 5.28 10*3/mm3      RBC 3.43 10*6/mm3      Hemoglobin 10.2 g/dL      Hematocrit 32.1 %      MCV 93.6 fL      MCH 29.7 pg      MCHC 31.8 g/dL      RDW 18.7 %      RDW-SD 64.1 fl      MPV 10.6 fL      Platelets 273 10*3/mm3      Neutrophil % 68.2 %      Lymphocyte % 22.5 %      Monocyte % 4.9 %      Eosinophil % 3.2 %      Basophil % 0.6 %      Immature Grans % 0.6 %      Neutrophils, Absolute 3.60 10*3/mm3      Lymphocytes, Absolute 1.19 10*3/mm3      Monocytes, Absolute 0.26 10*3/mm3      Eosinophils, Absolute 0.17 10*3/mm3      Basophils, Absolute 0.03 10*3/mm3      Immature Grans, Absolute 0.03 10*3/mm3      nRBC 0.2 /100 WBC     Comprehensive Metabolic Panel [[id number]]  (Abnormal) Collected:  05/05/19 0447    Specimen:  Blood Updated:  05/05/19 0633     Glucose 120 mg/dL      BUN 14 mg/dL      Creatinine 0.89 mg/dL      Sodium 134 mmol/L      Potassium 4.7 mmol/L      Chloride 103 mmol/L      CO2 22.3 mmol/L      Calcium 8.2 mg/dL      Total Protein 5.7 g/dL      Albumin 2.90 g/dL      ALT (SGPT) 248 U/L      AST (SGOT) 200 U/L      Alkaline Phosphatase 642 U/L      Total Bilirubin 14.6 mg/dL      eGFR Non African Amer 82 mL/min/1.73      Globulin 2.8 gm/dL      A/G Ratio 1.0 g/dL      BUN/Creatinine Ratio 15.7     Anion Gap 8.7 mmol/L     Narrative:       GFR Normal >60  Chronic Kidney Disease <60  Kidney Failure <15    CBC Auto Differential [[id number]]  (Abnormal) Collected:  05/05/19 0447    Specimen:  Blood Updated:  05/05/19 0610     WBC 6.12 10*3/mm3      RBC 3.68 10*6/mm3      Hemoglobin 10.9 g/dL      Hematocrit 33.9 %      MCV 92.1 fL      MCH 29.6 pg      MCHC 32.2 g/dL       RDW 18.4 %      RDW-SD 61.9 fl      MPV 10.2 fL      Platelets 265 10*3/mm3      nRBC 0.3 /100 WBC     Manual Differential [[id number]]  (Normal) Collected:  05/05/19 0447    Specimen:  Blood Updated:  05/05/19 0610     Neutrophil % 68.0 %      Lymphocyte % 25.0 %      Monocyte % 5.0 %      Eosinophil % 2.0 %      Neutrophils Absolute 4.16 10*3/mm3      Lymphocytes Absolute 1.53 10*3/mm3      Monocytes Absolute 0.31 10*3/mm3      Eosinophils Absolute 0.12 10*3/mm3      RBC Morphology Normal     WBC Morphology Normal     Platelet Morphology Normal    Hepatitis Panel, Acute [[id number]]  (Normal) Collected:  05/04/19 1345    Specimen:  Blood Updated:  05/04/19 1453     Hepatitis B Surface Ag Non-Reactive     Hep A IgM Non-Reactive     Hep B C IgM Non-Reactive     Hepatitis C Ab Non-Reactive    CBC & Differential [[id number]] Collected:  05/04/19 1247    Specimen:  Blood Updated:  05/04/19 1404    Narrative:       The following orders were created for panel order CBC & Differential.  Procedure                               Abnormality         Status                     ---------                               -----------         ------                     CBC Auto Differential[[id number]]        Abnormal            Final result                 Please view results for these tests on the individual orders.    CBC Auto Differential [[id number]]  (Abnormal) Collected:  05/04/19 1247    Specimen:  Blood Updated:  05/04/19 1404     WBC 5.90 10*3/mm3      RBC 4.04 10*6/mm3      Hemoglobin 12.0 g/dL      Hematocrit 37.5 %      MCV 92.8 fL      MCH 29.7 pg      MCHC 32.0 g/dL      RDW 18.0 %      RDW-SD 61.4 fl      MPV 9.2 fL      Platelets 248 10*3/mm3      nRBC 0.5 /100 WBC     Manual Differential [[id number]]  (Normal) Collected:  05/04/19 1247    Specimen:  Blood Updated:  05/04/19 1404     Neutrophil % 64.0 %      Lymphocyte % 30.0 %      Monocyte % 5.0 %      Eosinophil % 1.0 %      Neutrophils Absolute 3.78 10*3/mm3       "Lymphocytes Absolute 1.77 10*3/mm3      Monocytes Absolute 0.30 10*3/mm3      Eosinophils Absolute 0.06 10*3/mm3      RBC Morphology Normal     WBC Morphology Normal     Platelet Morphology Normal    Comprehensive Metabolic Panel [[id number]]  (Abnormal) Collected:  05/04/19 1247    Specimen:  Blood Updated:  05/04/19 1327     Glucose 123 mg/dL      BUN 14 mg/dL      Creatinine 1.08 mg/dL      Sodium 135 mmol/L      Potassium 4.0 mmol/L      Chloride 98 mmol/L      CO2 24.1 mmol/L      Calcium 9.0 mg/dL      Total Protein 6.6 g/dL      Albumin 3.50 g/dL      ALT (SGPT) 265 U/L      AST (SGOT) 160 U/L      Alkaline Phosphatase 690 U/L      Total Bilirubin 15.2 mg/dL      eGFR Non African Amer 66 mL/min/1.73      Globulin 3.1 gm/dL      A/G Ratio 1.1 g/dL      BUN/Creatinine Ratio 13.0     Anion Gap 12.9 mmol/L     Narrative:       GFR Normal >60  Chronic Kidney Disease <60  Kidney Failure <15    Lipase [[id number]]  (Abnormal) Collected:  05/04/19 1247    Specimen:  Blood Updated:  05/04/19 1326     Lipase 9 U/L     Protime-INR [[id number]]  (Normal) Collected:  05/04/19 1247    Specimen:  Blood Updated:  05/04/19 1312     Protime 12.3 Seconds      INR 0.94    aPTT [[id number]]  (Normal) Collected:  05/04/19 1247    Specimen:  Blood Updated:  05/04/19 1312     PTT 25.9 seconds         /67 (BP Location: Right arm, Patient Position: Lying)   Pulse 63   Temp 97.4 °F (36.3 °C) (Oral)   Resp 17   Ht 177.8 cm (70\")   Wt 79.8 kg (176 lb)   SpO2 100%   BMI 25.25 kg/m²     Discharge Exam:  General Appearance:    Alert, cooperative, no distress                          Head:    Normocephalic, without obvious abnormality, atraumatic                          Eyes:  Less jaundiced                        Throat:   Lips, tongue, gums normal                          Neck:   Supple, symmetrical, trachea midline, no JVD                        Lungs:     Clear to auscultation bilaterally, respirations unlabored                " Chest Wall:    No tenderness or deformity                        Heart:    Regular rate and rhythm, S1 and S2 normal, no murmur,no  Rub or gallop                  Abdomen:     Soft, non-tender, bowel sounds active, no masses, no organomegaly                  Extremities:   Extremities normal, atraumatic, no cyanosis or edema                             Skin:   Skin is warm and dry,  no rashes or palpable lesions                  Neurologic:   no focal deficits noted     Disposition:  Home    Patient Instructions:      Discharge Medications      Continue These Medications      Instructions Start Date   aspirin 81 MG tablet   81 mg, Oral, Daily      Calcium-Vitamin D3 600-400 MG-UNIT tablet   2 tablets, Oral, Daily      INCRUSE ELLIPTA 62.5 MCG/INH aerosol powder   Generic drug:  Umeclidinium Bromide   1 puff, Inhalation, Every Morning      levothyroxine 200 MCG tablet  Commonly known as:  SYNTHROID, LEVOTHROID   200 mcg, Oral, Daily      predniSONE 1 MG tablet  Commonly known as:  DELTASONE   1 mg, Oral, 2 Times Daily      telmisartan 20 MG tablet  Commonly known as:  MICARDIS   20 mg, Oral, Nightly           Future Appointments   Date Time Provider Department Center   5/14/2019  2:30 PM Oscar Evangelista MD [facility] None     Follow-up Information     Pierre Olson MD Follow up in 1 week(s).    Specialty:  Family Medicine  Contact information:  [id number] [address]  [phone]                 Discharge Order (From admission, onward)    Start     Ordered    05/07/19 1343  Discharge patient  Once     Expected Discharge Date:  05/07/19    Discharge Disposition:  Home or Self Care    Physician of Record for Attribution - Please select from Treatment Team:  YORDAN MCCOY [[id number]]    Review needed by CMO to determine Physician of Record:  No       Question Answer Comment   Physician of Record for Attribution - Please select from Treatment Team YORDAN MCCOY    Review needed by CMO to  determine Physician of Record No        05/07/19 1343          Total time spent discharging patient including evaluation,post hospitalization follow up,  medication and post hospitalization instructions and education total time exceeds 30 minutes.    Signed:  Baron Desouza MD  5/7/2019  1:44 PM

## 2019-05-08 ENCOUNTER — READMISSION MANAGEMENT (OUTPATIENT)
Dept: CALL CENTER | Facility: HOSPITAL | Age: 79
End: 2019-05-08

## 2019-05-08 NOTE — OUTREACH NOTE
Prep Survey      Responses   Facility patient discharged from?  [facility]   Is patient eligible?  Yes   Discharge diagnosis  Elevated LFTs, hyperbilirubinemia, bacteremia d/t E. Coli, biliary stent obstruction, malignant neoplasm of prostate, ascending cholagitis, shortness of breath, biliary stricture, acquired hypothyroidism, essential HTN, mixed HLD, Hx CVA, HX prostate CA, malignant neoplasm metstatic to bone, retroperitoneal lymphadenopathy, elevated PSA, s/p ERCP with removal of debris   Does the patient have one of the following disease processes/diagnoses(primary or secondary)?  Other   Does the patient have Home health ordered?  No   Is there a DME ordered?  No   Comments regarding appointments  See AVS   Prep survey completed?  Yes          Margarette Elmore RN

## 2019-05-09 ENCOUNTER — READMISSION MANAGEMENT (OUTPATIENT)
Dept: CALL CENTER | Facility: HOSPITAL | Age: 79
End: 2019-05-09

## 2019-05-09 NOTE — OUTREACH NOTE
Medical Week 1 Survey      Responses   Facility patient discharged from?  [facility]   Does the patient have one of the following disease processes/diagnoses(primary or secondary)?  Other   Is there a successful TCM telephone encounter documented?  No   Week 1 attempt successful?  Yes   Call start time  1445   Call end time  1449   Discharge diagnosis  Elevated LFTs, hyperbilirubinemia, bacteremia d/t E. 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  Yes   Is the patient having any side effects they believe may be caused by any medication additions or changes?  No   Does the patient have all medications ordered at discharge?  N/A   Is the patient taking all medications as directed (includes completed medication regime)?  Yes   Comments regarding appointments  PCP on May 14   Does the patient have a primary care provider?   Yes   Does the patient have an appointment with their PCP within 7 days of discharge?  Yes   Has the patient kept scheduled appointments due by today?  N/A   Has home health visited the patient within 72 hours of discharge?  N/A   Psychosocial issues?  No   Did the patient receive a copy of their discharge instructions?  Yes   Nursing interventions  Reviewed instructions with patient   What is the patient's perception of their health status since discharge?  Improving   Is the patient/caregiver able to teach back signs and symptoms related to disease process for when to call PCP?  Yes   Is the patient/caregiver able to teach back signs and symptoms related to disease process for when to call 911?  Yes   Is the patient/caregiver able to teach back the hierarchy of who to call/visit for  "symptoms/problems? PCP, Specialist, Home health nurse, Urgent Care, ED, 911  Yes   Additional teach back comments  Pt reports his appetite has returned and states \"I feel much better.\"    Week 1 call completed?  Yes          Pablo Davis RN  "

## 2019-05-14 ENCOUNTER — OFFICE VISIT (OUTPATIENT)
Dept: FAMILY MEDICINE CLINIC | Facility: CLINIC | Age: 79
End: 2019-05-14

## 2019-05-14 VITALS
SYSTOLIC BLOOD PRESSURE: 95 MMHG | TEMPERATURE: 96.9 F | OXYGEN SATURATION: 98 % | RESPIRATION RATE: 18 BRPM | WEIGHT: 175 LBS | BODY MASS INDEX: 25.05 KG/M2 | HEIGHT: 70 IN | DIASTOLIC BLOOD PRESSURE: 64 MMHG | HEART RATE: 70 BPM

## 2019-05-14 DIAGNOSIS — R06.02 SHORTNESS OF BREATH: ICD-10-CM

## 2019-05-14 DIAGNOSIS — Z09 HOSPITAL DISCHARGE FOLLOW-UP: ICD-10-CM

## 2019-05-14 DIAGNOSIS — C79.51 MALIGNANT NEOPLASM METASTATIC TO BONE (HCC): ICD-10-CM

## 2019-05-14 DIAGNOSIS — K83.1 BILE DUCT OBSTRUCTION: Primary | ICD-10-CM

## 2019-05-14 PROCEDURE — 99214 OFFICE O/P EST MOD 30 MIN: CPT | Performed by: NURSE PRACTITIONER

## 2019-05-14 NOTE — PROGRESS NOTES
Subjective   Ismael Ball is a 79 y.o. male.     History of Present Illness   Ismael Ball 79 y.o. male presents today for hosptial follow up.  he was treated BH for  Biliary stent obstruction.  I reviewed all of the labs and diagnostic testing.    Current outpatient and discharge medications have been reconciled for the patient.  Reviewed by: OH Mahan    he does not have a follow up appointment with a specialist:     Hospital note as follows:    Hospital Course: Ismael Ball  is a 79-year-old white male with history of anemia, arthritis, recent admission for cholangitis with history of biliary stent, prostate cancer with bone metastases, chronic adrenal insufficiency, previous stroke, hyperlipidemia and hypothyroidism who recently been in the hospital with cholangitis and bacteremia with E. coli had been treated outpatient with Levaquin and subsequently finished antibiotics in the last day or so. He noticed increasing jaundice over the last couple days and was worse today. He had some nausea but no vomiting. He has had some mild abdominal discomfort but nothing really severe. He noticed that his color was markedly more jaundiced than it had been and he came to the emergency room for further evaluation treatment. He has not had any fever or chills. The patient was started on some IV antibiotics and admitted for further evaluation treatment of jaundice with history of biliary stent and recent admission for cholangitis and E. coli bacteremia.          The patient was admitted to the hospital and seen by GI medicine.  He underwent ERCP and had sweeping of his stent and removal of debris.  His bilirubin and liver tests were improving after the procedure and he was feeling much better and much less jaundiced.  His bilirubin dropped from 15 to about 5.  He felt well enough to go home and he will follow-up with his primary care doctor in 1 week for continuing care.    Patient reports feeling  short of breath for the past few days.  He sees pulmonologist Dr. Reza but has been unable to keep his f/u due to hospital stay.  He was to undergo bronchoscopy which had to be postponed.  He reports having chronic shortness of breath but that it is worse the past few days. States he is unable to walk more than a short distance without feeling out of breath.  He is having to take frequent breaks.  Denies cough, congestion, lower extremity swelling or chest pain. Patient's BP is low today. He has lost 10 lbs in the past few months and reports appetite has decreased.  He is seeing oncologist at [facility] for metastatic prostate cancer.  His last appt was in April.      The following portions of the patient's history were reviewed and updated as appropriate: allergies, current medications, past family history, past medical history, past social history, past surgical history and problem list.    Review of Systems   Constitutional: Positive for fatigue.   Respiratory: Positive for shortness of breath. Negative for cough, chest tightness and wheezing.    Cardiovascular: Negative for chest pain, palpitations and leg swelling.   Skin: Negative for rash.   Neurological: Positive for light-headedness.   Psychiatric/Behavioral: Negative for dysphoric mood and sleep disturbance. The patient is not nervous/anxious.        Objective   Physical Exam   Constitutional: He is oriented to person, place, and time. He appears well-developed and well-nourished.   Cardiovascular: Normal rate and regular rhythm.   Pulmonary/Chest: Effort normal and breath sounds normal.   Neurological: He is oriented to person, place, and time.   Skin: Skin is warm and dry.   Psychiatric: He has a normal mood and affect. His behavior is normal. Judgment and thought content normal.   Nursing note and vitals reviewed.      Assessment/Plan   Ismael was seen today for bile duct blockage.    Diagnoses and all orders for this visit:    Bile duct obstruction  -      Comprehensive metabolic panel  -     CBC & Differential    Malignant neoplasm metastatic to bone (CMS/HCC)    Shortness of breath    Hospital discharge follow-up      Take 1/2 tablet of telmisartan and f/u in 1 week for recheck.     Contacted Dr. Reza's office to get appt scheduled for patient. Was told that patient could not be seen for 3 weeks.  Discussed that sooner appt was necessary.  Was informed by office staff that call would be placed to Dr. Reza to advise and that we would be contacted back. Discussed this with patient and family. Advised him that was advisable to return to ER for acutely worsening shortness of breath but patient declined.  Offered repeat CT chest, but patient opted to wait to hear from Dr. Reza's office. Discussed with patient and family that it is imperative he go to ER if symptoms worsen. Patient and family verbalized understanding.

## 2019-05-16 ENCOUNTER — READMISSION MANAGEMENT (OUTPATIENT)
Dept: CALL CENTER | Facility: HOSPITAL | Age: 79
End: 2019-05-16

## 2019-05-16 NOTE — OUTREACH NOTE
Medical Week 2 Survey      Responses   Facility patient discharged from?  [facility]   Does the patient have one of the following disease processes/diagnoses(primary or secondary)?  Other   Week 2 attempt successful?  Yes   Call start time  1338   Discharge diagnosis  Elevated LFTs, hyperbilirubinemia, bacteremia d/t E. 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  Yes   Is the patient having any side effects they believe may be caused by any medication additions or changes?  Yes   Side effects comments   Has some SOA from Xtandia   Is the patient taking all medications as directed (includes completed medication regime)?  Yes   Has the patient kept scheduled appointments due by today?  Yes   Psychosocial issues?  No   What is the patient's perception of their health status since discharge?  Same   Is the patient/caregiver able to teach back signs and symptoms related to disease process for when to call PCP?  Yes   Is the patient/caregiver able to teach back signs and symptoms related to disease process for when to call 911?  Yes   Is the patient/caregiver able to teach back the hierarchy of who to call/visit for symptoms/problems? PCP, Specialist, Home health nurse, Urgent Care, ED, 911  Yes   Additional teach back comments  Pt states he is doing well--still having some SOA but using flutter valve provided to him and he is f/u with his MD.   Week 2 Call Completed?  Yes          Ibeth Zamora RN

## 2019-05-17 ENCOUNTER — TELEPHONE (OUTPATIENT)
Dept: FAMILY MEDICINE CLINIC | Facility: CLINIC | Age: 79
End: 2019-05-17

## 2019-05-17 NOTE — TELEPHONE ENCOUNTER
----- Message from OH Mahan sent at 5/14/2019  5:27 PM EDT -----  Regarding: hold BP medication   I had discussed with patient and family that his BP was low, but did not remind them before they left that he should cut his BP medication in half and f/u with Dr. Olson in a week.  He declined to go to ER or have CT done as he wanted to wait to hear about appt with Dr. Reza's office first.  When he his is able to follow up here will likely depend on if he is able to be seen by Dr. Reza or if he is advised to go to ER for worsening symptoms.  Either way, please call and remind him to take 1/2 tablet of telmisartan and f/u in a week if possible. Please also f/u with Dr. Reza's office tomorrow to see if they were able to get patient in.

## 2019-05-23 ENCOUNTER — READMISSION MANAGEMENT (OUTPATIENT)
Dept: CALL CENTER | Facility: HOSPITAL | Age: 79
End: 2019-05-23

## 2019-05-23 NOTE — OUTREACH NOTE
Medical Week 3 Survey      Responses   Facility patient discharged from?  [facility]   Does the patient have one of the following disease processes/diagnoses(primary or secondary)?  Other   Week 3 attempt successful?  Yes   Call start time  1209   Call end time  1213   Discharge diagnosis  Elevated LFTs, hyperbilirubinemia, bacteremia d/t E. 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  Yes   List who call center can speak with  Wife, Carrie Lofton reviewed with patient/caregiver?  Yes   Is the patient taking all medications as directed (includes completed medication regime)?  Yes   Has the patient kept scheduled appointments due by today?  Yes   Has home health visited the patient within 72 hours of discharge?  N/A   What is the patient's perception of their health status since discharge?  Same   Additional teach back comments  Pt is scheduled for bronchoscopy 5/29/19   Week 3 Call Completed?  Yes   Wrap up additional comments  Doing well except for SOA which has remained unchanged since hospitalization. Will be having bronchoscopy 5/29/19.           Maye Green RN

## 2019-05-29 ENCOUNTER — ANESTHESIA EVENT (OUTPATIENT)
Dept: GASTROENTEROLOGY | Facility: HOSPITAL | Age: 79
End: 2019-05-29

## 2019-05-29 ENCOUNTER — ANESTHESIA (OUTPATIENT)
Dept: GASTROENTEROLOGY | Facility: HOSPITAL | Age: 79
End: 2019-05-29

## 2019-05-29 ENCOUNTER — HOSPITAL ENCOUNTER (OUTPATIENT)
Facility: HOSPITAL | Age: 79
Setting detail: HOSPITAL OUTPATIENT SURGERY
Discharge: HOME | End: 2019-05-29
Attending: INTERNAL MEDICINE | Admitting: INTERNAL MEDICINE

## 2019-05-29 VITALS
SYSTOLIC BLOOD PRESSURE: 149 MMHG | BODY MASS INDEX: 24.48 KG/M2 | DIASTOLIC BLOOD PRESSURE: 82 MMHG | RESPIRATION RATE: 18 BRPM | TEMPERATURE: 97.6 F | HEART RATE: 64 BPM | WEIGHT: 171 LBS | OXYGEN SATURATION: 98 % | HEIGHT: 70 IN

## 2019-05-29 DIAGNOSIS — J98.11 ATELECTASIS PULMONARY: ICD-10-CM

## 2019-05-29 LAB
APPEARANCE FLD: CLEAR
COLOR FLD: COLORLESS
EOSINOPHIL NFR FLD MANUAL: 1 %
GIE STN SPEC: NORMAL
LYMPHOCYTES NFR FLD MANUAL: 6 %
MONOCYTES NFR FLD: 2 %
MONOS+MACROS NFR FLD: 53 %
NEUTROPHILS NFR FLD MANUAL: 38 %
OTHER CELLS FLUID PER 100/WBCS: 37 /100 WBCS
RBC # FLD AUTO: 192 /MM3
WBC # FLD AUTO: 11 /MM3

## 2019-05-29 PROCEDURE — 87071 CULTURE AEROBIC QUANT OTHER: CPT | Performed by: INTERNAL MEDICINE

## 2019-05-29 PROCEDURE — 25010000002 PROPOFOL 10 MG/ML EMULSION: Performed by: NURSE ANESTHETIST, CERTIFIED REGISTERED

## 2019-05-29 PROCEDURE — 88312 SPECIAL STAINS GROUP 1: CPT | Performed by: INTERNAL MEDICINE

## 2019-05-29 PROCEDURE — 87206 SMEAR FLUORESCENT/ACID STAI: CPT | Performed by: INTERNAL MEDICINE

## 2019-05-29 PROCEDURE — 87116 MYCOBACTERIA CULTURE: CPT | Performed by: INTERNAL MEDICINE

## 2019-05-29 PROCEDURE — 87102 FUNGUS ISOLATION CULTURE: CPT | Performed by: INTERNAL MEDICINE

## 2019-05-29 PROCEDURE — 88112 CYTOPATH CELL ENHANCE TECH: CPT | Performed by: INTERNAL MEDICINE

## 2019-05-29 PROCEDURE — 87070 CULTURE OTHR SPECIMN AEROBIC: CPT | Performed by: INTERNAL MEDICINE

## 2019-05-29 PROCEDURE — 89051 BODY FLUID CELL COUNT: CPT | Performed by: INTERNAL MEDICINE

## 2019-05-29 PROCEDURE — 87205 SMEAR GRAM STAIN: CPT | Performed by: INTERNAL MEDICINE

## 2019-05-29 RX ORDER — PROMETHAZINE HYDROCHLORIDE 25 MG/ML
6.25 INJECTION, SOLUTION INTRAMUSCULAR; INTRAVENOUS
Status: DISCONTINUED | OUTPATIENT
Start: 2019-05-29 | End: 2019-05-29 | Stop reason: HOSPADM

## 2019-05-29 RX ORDER — NALOXONE HCL 0.4 MG/ML
0.2 VIAL (ML) INJECTION AS NEEDED
Status: DISCONTINUED | OUTPATIENT
Start: 2019-05-29 | End: 2019-05-29 | Stop reason: HOSPADM

## 2019-05-29 RX ORDER — ONDANSETRON 2 MG/ML
4 INJECTION INTRAMUSCULAR; INTRAVENOUS ONCE AS NEEDED
Status: DISCONTINUED | OUTPATIENT
Start: 2019-05-29 | End: 2019-05-29 | Stop reason: HOSPADM

## 2019-05-29 RX ORDER — PROPOFOL 10 MG/ML
VIAL (ML) INTRAVENOUS AS NEEDED
Status: DISCONTINUED | OUTPATIENT
Start: 2019-05-29 | End: 2019-05-29 | Stop reason: SURG

## 2019-05-29 RX ORDER — HYDROMORPHONE HCL 110MG/55ML
0.5 PATIENT CONTROLLED ANALGESIA SYRINGE INTRAVENOUS
Status: DISCONTINUED | OUTPATIENT
Start: 2019-05-29 | End: 2019-05-29 | Stop reason: HOSPADM

## 2019-05-29 RX ORDER — PROMETHAZINE HYDROCHLORIDE 25 MG/ML
12.5 INJECTION, SOLUTION INTRAMUSCULAR; INTRAVENOUS ONCE AS NEEDED
Status: DISCONTINUED | OUTPATIENT
Start: 2019-05-29 | End: 2019-05-29 | Stop reason: HOSPADM

## 2019-05-29 RX ORDER — DIPHENHYDRAMINE HCL 25 MG
25 CAPSULE ORAL
Status: DISCONTINUED | OUTPATIENT
Start: 2019-05-29 | End: 2019-05-29 | Stop reason: HOSPADM

## 2019-05-29 RX ORDER — FLUMAZENIL 0.1 MG/ML
0.2 INJECTION INTRAVENOUS AS NEEDED
Status: DISCONTINUED | OUTPATIENT
Start: 2019-05-29 | End: 2019-05-29 | Stop reason: HOSPADM

## 2019-05-29 RX ORDER — FENTANYL CITRATE 50 UG/ML
50 INJECTION, SOLUTION INTRAMUSCULAR; INTRAVENOUS
Status: DISCONTINUED | OUTPATIENT
Start: 2019-05-29 | End: 2019-05-29 | Stop reason: HOSPADM

## 2019-05-29 RX ORDER — LIDOCAINE HYDROCHLORIDE 10 MG/ML
INJECTION, SOLUTION EPIDURAL; INFILTRATION; INTRACAUDAL; PERINEURAL AS NEEDED
Status: DISCONTINUED | OUTPATIENT
Start: 2019-05-29 | End: 2019-05-29 | Stop reason: HOSPADM

## 2019-05-29 RX ORDER — ACETAMINOPHEN 325 MG/1
650 TABLET ORAL ONCE AS NEEDED
Status: DISCONTINUED | OUTPATIENT
Start: 2019-05-29 | End: 2019-05-29 | Stop reason: HOSPADM

## 2019-05-29 RX ORDER — HYDROCODONE BITARTRATE AND ACETAMINOPHEN 7.5; 325 MG/1; MG/1
1 TABLET ORAL ONCE AS NEEDED
Status: DISCONTINUED | OUTPATIENT
Start: 2019-05-29 | End: 2019-05-29 | Stop reason: HOSPADM

## 2019-05-29 RX ORDER — PROMETHAZINE HYDROCHLORIDE 25 MG/1
25 SUPPOSITORY RECTAL ONCE AS NEEDED
Status: DISCONTINUED | OUTPATIENT
Start: 2019-05-29 | End: 2019-05-29 | Stop reason: HOSPADM

## 2019-05-29 RX ORDER — LIDOCAINE HYDROCHLORIDE 20 MG/ML
INJECTION, SOLUTION INFILTRATION; PERINEURAL AS NEEDED
Status: DISCONTINUED | OUTPATIENT
Start: 2019-05-29 | End: 2019-05-29 | Stop reason: SURG

## 2019-05-29 RX ORDER — PROPOFOL 10 MG/ML
VIAL (ML) INTRAVENOUS CONTINUOUS PRN
Status: DISCONTINUED | OUTPATIENT
Start: 2019-05-29 | End: 2019-05-29 | Stop reason: SURG

## 2019-05-29 RX ORDER — EPHEDRINE SULFATE 50 MG/ML
5 INJECTION, SOLUTION INTRAVENOUS ONCE AS NEEDED
Status: DISCONTINUED | OUTPATIENT
Start: 2019-05-29 | End: 2019-05-29 | Stop reason: HOSPADM

## 2019-05-29 RX ORDER — PROMETHAZINE HYDROCHLORIDE 25 MG/1
25 TABLET ORAL ONCE AS NEEDED
Status: DISCONTINUED | OUTPATIENT
Start: 2019-05-29 | End: 2019-05-29 | Stop reason: HOSPADM

## 2019-05-29 RX ORDER — OXYCODONE AND ACETAMINOPHEN 7.5; 325 MG/1; MG/1
1 TABLET ORAL ONCE AS NEEDED
Status: DISCONTINUED | OUTPATIENT
Start: 2019-05-29 | End: 2019-05-29 | Stop reason: HOSPADM

## 2019-05-29 RX ORDER — ACETAMINOPHEN 650 MG/1
650 SUPPOSITORY RECTAL ONCE AS NEEDED
Status: DISCONTINUED | OUTPATIENT
Start: 2019-05-29 | End: 2019-05-29 | Stop reason: HOSPADM

## 2019-05-29 RX ORDER — HYDRALAZINE HYDROCHLORIDE 20 MG/ML
5 INJECTION INTRAMUSCULAR; INTRAVENOUS
Status: DISCONTINUED | OUTPATIENT
Start: 2019-05-29 | End: 2019-05-29 | Stop reason: HOSPADM

## 2019-05-29 RX ORDER — SODIUM CHLORIDE, SODIUM LACTATE, POTASSIUM CHLORIDE, CALCIUM CHLORIDE 600; 310; 30; 20 MG/100ML; MG/100ML; MG/100ML; MG/100ML
1000 INJECTION, SOLUTION INTRAVENOUS CONTINUOUS
Status: DISCONTINUED | OUTPATIENT
Start: 2019-05-29 | End: 2019-05-29 | Stop reason: HOSPADM

## 2019-05-29 RX ADMIN — PROPOFOL 150 MG: 10 INJECTION, EMULSION INTRAVENOUS at 08:06

## 2019-05-29 RX ADMIN — SODIUM CHLORIDE, POTASSIUM CHLORIDE, SODIUM LACTATE AND CALCIUM CHLORIDE 1000 ML: 600; 310; 30; 20 INJECTION, SOLUTION INTRAVENOUS at 07:47

## 2019-05-29 RX ADMIN — PROPOFOL 180 MCG/KG/MIN: 10 INJECTION, EMULSION INTRAVENOUS at 08:06

## 2019-05-29 RX ADMIN — LIDOCAINE HYDROCHLORIDE 40 MG: 20 INJECTION, SOLUTION INFILTRATION; PERINEURAL at 08:06

## 2019-05-29 NOTE — ANESTHESIA PREPROCEDURE EVALUATION
Anesthesia Evaluation     Patient summary reviewed and Nursing notes reviewed   history of anesthetic complications: PONV  NPO Solid Status: > 8 hours  NPO Liquid Status: > 4 hours           Airway   Mallampati: II  Dental      Pulmonary - normal exam   (+) pneumonia resolved , shortness of breath,   Cardiovascular - normal exam    (+) hypertension less than 2 medications, PVD, hyperlipidemia,       Neuro/Psych  (+) TIA, CVA, dizziness/light headedness, weakness,     GI/Hepatic/Renal/Endo    (+)   hypothyroidism,     Musculoskeletal     Abdominal    Substance History      OB/GYN          Other   (+) arthritis   history of cancer                    Anesthesia Plan    ASA 3     MAC     intravenous induction   Anesthetic plan, all risks, benefits, and alternatives have been provided, discussed and informed consent has been obtained with: patient.

## 2019-05-29 NOTE — ANESTHESIA POSTPROCEDURE EVALUATION
"Patient: Ismael Ball    Procedure Summary     Date:  05/29/19 Room / Location:  [facility] /  [facility]    Anesthesia Start:  0801 Anesthesia Stop:  0850    Procedure:  BRONCHOSCOPY (N/A Bronchus) Diagnosis:      Surgeon:  Anjum Reza MD Provider:  Brian Luz MD    Anesthesia Type:  general ASA Status:  3          Anesthesia Type: general  Last vitals  BP   140/74 (05/29/19 0722)   Temp   36.4 °C (97.6 °F) (05/29/19 0722)   Pulse   70 (05/29/19 0722)   Resp   20 (05/29/19 0722)     SpO2   98 % (05/29/19 0722)     Post Anesthesia Care and Evaluation    Patient location during evaluation: bedside  Patient participation: complete - patient participated  Level of consciousness: awake and alert  Pain management: adequate  Airway patency: patent  Anesthetic complications: No anesthetic complications    Cardiovascular status: acceptable  Respiratory status: acceptable  Hydration status: acceptable    Comments: /74 (BP Location: Left arm, Patient Position: Lying)   Pulse 70   Temp 36.4 °C (97.6 °F) (Oral)   Resp 20   Ht 177.8 cm (70\")   Wt 77.6 kg (171 lb)   SpO2 98%   BMI 24.54 kg/m²       "

## 2019-05-30 LAB
CYTO UR: NORMAL
LAB AP CASE REPORT: NORMAL
PATH REPORT.FINAL DX SPEC: NORMAL
PATH REPORT.GROSS SPEC: NORMAL

## 2019-05-31 LAB
BACTERIA SPEC AEROBE CULT: NO GROWTH
BACTERIA SPEC RESP CULT: NO GROWTH
GRAM STN SPEC: NORMAL

## 2019-06-03 ENCOUNTER — READMISSION MANAGEMENT (OUTPATIENT)
Dept: CALL CENTER | Facility: HOSPITAL | Age: 79
End: 2019-06-03

## 2019-06-03 NOTE — OUTREACH NOTE
Medical Week 4 Survey      Responses   Facility patient discharged from?  [facility]   Does the patient have one of the following disease processes/diagnoses(primary or secondary)?  Other   Week 4 attempt successful?  Yes   Call start time  1523   Call end time  1533   Discharge diagnosis  Elevated LFTs, hyperbilirubinemia, bacteremia d/t E. 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  Yes   List who call center can speak with  Wife, Carrie Lofton reviewed with patient/caregiver?  Yes   Is the patient having any side effects they believe may be caused by any medication additions or changes?  No   Is the patient taking all medications as directed (includes completed medication regime)?  Yes   Has the patient kept scheduled appointments due by today?  Yes   Is the patient still receiving Home Health Services?  Yes   Psychosocial issues?  No   What is the patient's perception of their health status since discharge?  Improving   Is the patient/caregiver able to teach back signs and symptoms related to disease process for when to call PCP?  Yes   Is the patient/caregiver able to teach back signs and symptoms related to disease process for when to call 911?  Yes   Is the patient/caregiver able to teach back the hierarchy of who to call/visit for symptoms/problems? PCP, Specialist, Home health nurse, Urgent Care, ED, 911  Yes   Week 4 Call Completed?  Yes          Dory Singh RN

## 2019-06-04 NOTE — H&P
Patient Care Team:  Pierre Olson MD as PCP - General (Family Medicine)  Master Corcoran MD as Consulting Physician (Medical Oncology)  Oscar Evangelista MD as Consulting Physician (Gastroenterology)      Subjective     Patient is a 79 y.o. male.  Seen in office 4/2319 there is no change to my office H&P patient is here for bronchoscopy evaluate persistent right lower lobe segmental collapse/atelectasis seen on CT and several chest x-rays no acute respiratory complaints at this time      Review of Systems:  He reports no change from his H&P initially in my office      History  Past Medical History:   Diagnosis Date   • Abdominal pain    • Anemia    • Aneurysm of ascending aorta (CMS/HCC)    • Arthritis    • Ascending cholangitis    • Aspiration pneumonia of right lower lobe (CMS/HCC)    • Biliary stricture    • Cancer (CMS/HCC)    • Chills    • Cholangitis    • Chronic adrenal insufficiency (CMS/HCC)    • CVA (cerebral vascular accident) (CMS/HCC)    • Diaphoresis    • Diarrhea    • Diverticulosis of colon    • Dizziness    • E coli infection 2017    blood   • Elevated brain natriuretic peptide (BNP) level    • Elevated cholesterol    • Elevated liver function tests    • Elevated liver function tests 4/24/2019   • Fatigue    • Fever    • Herpes labialis    • History of pneumonia     MARCH 2017   • History of transfusion    • Hyperlipidemia    • Hypertension    • Hypothyroidism    • Metastatic disease (CMS/HCC)    • PONV (postoperative nausea and vomiting)    • Prostate cancer (CMS/HCC)     with bony metastases   • Septic shock (CMS/HCC)    • Shortness of breath    • Vomiting    • Weakness      Past Surgical History:   Procedure Laterality Date   • BILE DUCT STENT PLACEMENT     • BRONCHOSCOPY N/A 5/29/2019    Procedure: BRONCHOSCOPY;  Surgeon: Anjum Reza MD;  Location: [facility] ENDOSCOPY;  Service: Pulmonary   • CARDIAC CATHETERIZATION     • CATARACT EXTRACTION, BILATERAL Bilateral    •  COLONOSCOPY  unknown    normal   • ERCP N/A 5/6/2019    Procedure: ENDOSCOPIC RETROGRADE CHOLANGIOPANCREATOGRAPHY WITH BALLOON SWEEP;  Surgeon: Oscar Evangelista MD;  Location: [facility] ENDOSCOPY;  Service: Gastroenterology   • EYE SURGERY  12/05/2015    cataract   • HERNIA REPAIR     • [place] ERCP DX COLLECTION SPECIMEN BRUSHING/WASHING N/A 3/24/2017    Procedure: ENDOSCOPIC RETROGRADE CHOLANGIOPANCREATOGRAPHY, STENT REMOVAL, STENT PLACEMENT, CHOLANGIOGRAM;  Surgeon: Oscar Evangelista MD;  Location: [facility] OR;  Service: Gastroenterology   • NH ERCP DX COLLECTION SPECIMEN BRUSHING/WASHING N/A 5/1/2017    Procedure: ENDOSCOPIC RETROGRADE CHOLANGIOPANCREATOGRAPHY WITH STENT REMOVAL, SPYGLASS BIOPSIES AND WALLFLEX BILIARY STENT PLACEMENT;  Surgeon: Oscar Evangelista MD;  Location: [facility] ENDOSCOPY;  Service: Gastroenterology   • PROSTATECTOMY     • TOTAL HIP ARTHROPLASTY Right      Social History     Socioeconomic History   • Marital status:      Spouse name: Not on file   • Number of children: Not on file   • Years of education: Not on file   • Highest education level: Not on file   Tobacco Use   • Smoking status: Former Smoker     Types: Cigars   • Smokeless tobacco: Never Used   • Tobacco comment: cigar quit 40 yrs ago   Substance and Sexual Activity   • Alcohol use: Yes     Comment: A GLASS OF WINE ONCE A MONTH    • Drug use: Defer   • Sexual activity: Defer     Family History   Problem Relation Age of Onset   • Arthritis Mother    • Hypertension Mother    • Arthritis Father    • Cancer Father    • Heart disease Father    • Heart attack Father    • Heart disease Sister          Allergies:  Penicillins and Statins    Medications:  Prior to Admission medications    Medication Sig Start Date End Date Taking? Authorizing Provider   Enzalutamide (XTANDI PO) Take  by mouth 2 (Two) Times a Day.   Yes Provider, MD Jena   levothyroxine (SYNTHROID, LEVOTHROID) 200 MCG tablet Take 1 tablet by mouth Daily  "for 180 days. 2/27/19 8/26/19 Yes Pierre Olson MD   predniSONE (DELTASONE) 1 MG tablet Take 1 mg by mouth 2 (Two) Times a Day.   Yes ProviderJena MD   telmisartan (MICARDIS) 20 MG tablet Take 1 tablet by mouth Every Night for 180 days. 2/27/19 8/26/19 Yes Pierre Olson MD   Umeclidinium Bromide (INCRUSE ELLIPTA) 62.5 MCG/INH aerosol powder  Inhale 1 puff Every Morning.   Yes Jena Bajwa MD   aspirin 81 MG tablet Take 1 tablet by mouth Daily for 30 days. 8/15/18 5/4/19  Pierre Olson MD   Calcium Carb-Cholecalciferol (CALCIUM-VITAMIN D3) 600-400 MG-UNIT tablet Take 2 tablets by mouth Daily.    ProviderJena MD         No current facility-administered medications for this encounter.     Objective     Vital Signs  Vital Sign Min/Max for last 24 hours  No Data Recorded   No Data Recorded   No Data Recorded   No Data Recorded   No Data Recorded   No Data Recorded   No Data Recorded     No intake or output data in the 24 hours ending 06/04/19 1936  No intake/output data recorded.  Last Weight and Admission Weight        05/29/19 0722   Weight: 77.6 kg (171 lb)     Flowsheet Rows      First Filed Value   Admission Height  177.8 cm (70\") Documented at 05/29/2019 0722   Admission Weight  77.6 kg (171 lb) Documented at 05/29/2019 0722          Body mass index is 24.54 kg/m².           Physical Exam:  Well-developed white here for bronchoscopy  Conjunctiva are clear  Oral mucous membranes are little dry no erythema or exudate  Neck trachea midline no jugular venous distention no adenopathy  Chest clear no wheezes rales or rhonchi nonlabored symmetric expansion  Cardiac: Regular rate rhythm no murmur  Abdomen: Soft nontender no palpable organomegaly or masses  Extremities no clubbing cyanosis no edema       Labs:      Estimated Creatinine Clearance: 82.2 mL/min (A) (by C-G formula based on SCr of 0.46 mg/dL (L)).                                  Microbiology Results (last 10 days)     Procedure " Component Value - Date/Time    Fungus Culture - Lavage, Lung, Right Lower Lobe [[id number]] Collected:  05/29/19 0841    Lab Status:  Preliminary result Specimen:  Lavage from Lung, Right Lower Lobe Updated:  06/03/19 0933     Fungus Culture No fungus isolated at less than 1 week    Fungus Smear - Lavage, Lung, Right Lower Lobe [[id number]] Collected:  05/29/19 0841    Lab Status:  Final result Specimen:  Lavage from Lung, Right Lower Lobe Updated:  05/29/19 1116     Fungal Stain No fungal elements seen    BAL Culture, Quantitative - Lavage, Lung, Right Lower Lobe [[id number]] Collected:  05/29/19 0841    Lab Status:  Final result Specimen:  Lavage from Lung, Right Lower Lobe Updated:  05/31/19 0621     BAL Culture No growth     Gram Stain Rare (1+) WBCs seen      No organisms seen    Fungus Culture - Brushing, Lung, Right Lower Lobe [[id number]] Collected:  05/29/19 0836    Lab Status:  Preliminary result Specimen:  Brushing from Lung, Right Lower Lobe Updated:  06/03/19 0933     Fungus Culture No fungus isolated at less than 1 week    AFB Culture - Brushing, Lung, Right Lower Lobe [[id number]] Collected:  05/29/19 0836    Lab Status:  Preliminary result Specimen:  Brushing from Lung, Right Lower Lobe Updated:  06/03/19 0933     AFB Culture No AFB isolated at less than 1 week     AFB Stain No acid fast bacilli seen on concentrated smear    Respiratory Culture - Brushing, Lung, Right Lower Lobe [[id number]] Collected:  05/29/19 0836    Lab Status:  Final result Specimen:  Brushing from Lung, Right Lower Lobe Updated:  05/31/19 0622     Respiratory Culture No growth     Gram Stain No WBCs or organisms seen            Diagnostics:  Fl Ercp Biliary Duct Only    Result Date: 5/6/2019  FL ERCP BILIARY DUCT ONLY-  INDICATIONS: Bile duct obstruction, ERCP  TECHNIQUE: FLUOROSCOPIC ASSISTANCE IN THE OPERATING ROOM.  FINDINGS:  7 intraoperative fluoroscopic spot views were obtained and recorded the PACS for review, revealing  biliary stent, ERCP, opacification of central intrahepatic biliary ducts, placement of a new common biliary duct stent, partial opacification of duodenum. Please see operative report for full details.  Fluoroscopy time: 5 minutes, 26 seconds       As described.  This report was finalized on 5/6/2019 3:10 PM by Dr. Ryan Kaiser M.D.      Results for orders placed during the hospital encounter of 06/15/17   Adult Transthoracic Echo Complete    Narrative · Left ventricular systolic function is normal. Calculated EF = 60.3%.   Estimated EF was in agreement with the calculated EF. Estimated EF = 60%.   Normal left ventricular cavity size noted. All left ventricular wall   segments contract normally. Left ventricular wall thickness is consistent   with mild concentric hypertrophy. Left ventricular diastolic dysfunction   is noted (grade I) consistent with impaired relaxation.  · There is mild aortic annular calcification. The valve appears   trileaflet. The aortic valve is abnormal in structure.  · Moderate mitral annular calcification is present.  · Physiologic tricuspid valve regurgitation is present. Insufficient TR   velocity profile to estimate the right ventricular systolic pressure.  · The aortic arch was not well visualized. The aortic arch is not well   seen. Appears to be at least moderately dilated. Patient will be contacted   to return for additional imaging.  · Addendum: Additional suprasternal notch imaging did not clarify aortic   arch size any further. Consider alternative imaging modality              Assessment/Plan     1. Right lower lobe atelectasis/collapse persistent plan bronchoscopy      Anjum Reza MD  06/04/19  7:36 PM    Time:

## 2019-06-26 LAB
FUNGUS WND CULT: NORMAL
FUNGUS WND CULT: NORMAL

## 2019-07-01 ENCOUNTER — OFFICE VISIT (OUTPATIENT)
Dept: FAMILY MEDICINE CLINIC | Facility: CLINIC | Age: 79
End: 2019-07-01

## 2019-07-01 VITALS
HEIGHT: 70 IN | OXYGEN SATURATION: 97 % | HEART RATE: 73 BPM | SYSTOLIC BLOOD PRESSURE: 93 MMHG | WEIGHT: 173 LBS | DIASTOLIC BLOOD PRESSURE: 54 MMHG | BODY MASS INDEX: 24.77 KG/M2

## 2019-07-01 DIAGNOSIS — Z09 HOSPITAL DISCHARGE FOLLOW-UP: Primary | ICD-10-CM

## 2019-07-01 DIAGNOSIS — E03.9 ACQUIRED HYPOTHYROIDISM: ICD-10-CM

## 2019-07-01 DIAGNOSIS — I10 ESSENTIAL HYPERTENSION: ICD-10-CM

## 2019-07-01 PROBLEM — T85.590A BILIARY STENT OBSTRUCTION: Status: RESOLVED | Noted: 2019-04-24 | Resolved: 2019-07-01

## 2019-07-01 PROBLEM — K83.09 ASCENDING CHOLANGITIS: Status: RESOLVED | Noted: 2017-03-22 | Resolved: 2019-07-01

## 2019-07-01 PROBLEM — K83.09 ACUTE CHOLANGITIS: Status: RESOLVED | Noted: 2019-04-25 | Resolved: 2019-07-01

## 2019-07-01 PROBLEM — R78.81 BACTEREMIA DUE TO ESCHERICHIA COLI: Status: RESOLVED | Noted: 2019-04-25 | Resolved: 2019-07-01

## 2019-07-01 PROBLEM — K83.1 BILIARY STRICTURE: Status: RESOLVED | Noted: 2017-02-11 | Resolved: 2019-07-01

## 2019-07-01 PROBLEM — B96.20 BACTEREMIA DUE TO ESCHERICHIA COLI: Status: RESOLVED | Noted: 2019-04-25 | Resolved: 2019-07-01

## 2019-07-01 PROCEDURE — 99214 OFFICE O/P EST MOD 30 MIN: CPT | Performed by: FAMILY MEDICINE

## 2019-07-01 RX ORDER — TELMISARTAN 20 MG/1
10 TABLET ORAL NIGHTLY
Qty: 45 TABLET | Refills: 1
Start: 2019-07-01 | End: 2020-03-05

## 2019-07-01 NOTE — PROGRESS NOTES
Rooming Tab(CC,VS,Pt Hx,Fall Screen)  Chief Complaint   Patient presents with   • Hypertension     hospital follow up   • Hyperlipidemia       Subjective   Patient presents the office today to recheck blood pressure.  He is blood pressure is almost too well controlled on 20 mg of telmisartan.  We will decrease his dose to 10 mg daily and have follow-up in August.  Patient has had several admissions to the hospital.  Initially he was jaundiced and had thoughts that it may have been due to E. coli infection.  He was treated but then went home and once again became jaundiced.  He came back into the hospital on May 4 and there was actually sludge and blockage for his biliary stent which was cleaned up.  Since that procedure his jaundice went away completely and he has felt much better.  He then developed a collapsed lung on the right which was corrected by bronchoscopy on May 29 by Dr. Reza.  Since that procedure he has had better breathing and exercise tolerance.  He was able to complete a round of golf just this morning.  He still does have some shortness of breath and has been using over-the-counter products to help with this but will check these products with his pulmonologist before using further.  I have reviewed and updated his medications, medical history and problem list during today's office visit.     Patient Care Team:  Pierre Olson MD as PCP - General (Family Medicine)  Master Corcoran MD as Consulting Physician (Medical Oncology)  Oscar Evangelista MD as Consulting Physician (Gastroenterology)    Problem List Tab  Medications Tab  Synopsis Tab  Chart Review Tab  Care Everywhere Tab  Immunizations Tab  Patient History Tab    Social History     Tobacco Use   • Smoking status: Former Smoker     Types: Cigars   • Smokeless tobacco: Never Used   • Tobacco comment: cigar quit 40 yrs ago   Substance Use Topics   • Alcohol use: Yes     Comment: A GLASS OF WINE ONCE A MONTH        Review of Systems  "  Constitutional: Positive for fatigue.   Respiratory: Positive for cough and shortness of breath.    Skin: Negative for color change.       Objective     Rooming Tab(CC,VS,Pt Hx,Fall Screen)  BP 93/54   Pulse 73   Ht 177.8 cm (70\")   Wt 78.5 kg (173 lb)   SpO2 97%   BMI 24.82 kg/m²     Body mass index is 24.82 kg/m².    Physical Exam   Constitutional: He is oriented to person, place, and time. He appears well-developed. No distress.   Eyes: Conjunctivae and lids are normal.   Neck: Carotid bruit is not present.   Cardiovascular: Normal rate, regular rhythm and normal heart sounds.   Pulmonary/Chest: Effort normal. He has decreased breath sounds in the right lower field.   Neurological: He is alert and oriented to person, place, and time.   Skin: Skin is warm and dry.   Psychiatric: He has a normal mood and affect. His behavior is normal.   Vitals reviewed.       Statin Decision Aid  Data Reviewed:               Lab Results   Component Value Date    GLU 91 06/05/2019    BUN 19 06/05/2019    CREATININE 0.7 06/05/2019    CREATININE 0.7 04/10/2019    EGFRIFNONA >150 05/07/2019     06/05/2019    K 4.6 06/05/2019     06/05/2019    CALCIUM 8.9 06/05/2019    ALBUMIN 3.6 06/05/2019    BILITOT 1.1 06/05/2019    ALKPHOS 106 06/05/2019    AST 21 06/05/2019    ALT 25 06/05/2019    WBC 4.55 06/05/2019    RBC 4.41 (L) 06/05/2019    RBC 4.38 (L) 04/10/2019    HCT 41.0 06/05/2019    MCV 93.0 06/05/2019    MCH 29.0 06/05/2019    INR 0.94 05/04/2019      Assessment/Plan   Order Review Tab  Health Maintenance Tab  Patient Plan/Order Tab  Diagnoses and all orders for this visit:    1. Hospital discharge follow-up (Primary)  Comments:  Overall, patient doing much better.  Continue follow-up with pulmonology as ordered.    2. Essential hypertension  Assessment & Plan:  Hypertension is Almost 2 well-controlled.  We will change therapy to telmisartan 10 mg with 1 month follow-up..    Blood pressure will be reassessed in 1 " month.    Orders:  -     telmisartan (MICARDIS) 20 MG tablet; Take 0.5 tablets by mouth Every Night for 180 days.  Dispense: 45 tablet; Refill: 1    3. Acquired hypothyroidism  Assessment & Plan:  Continue same thyroid medication.        Wrapup Tab  Return in about 1 month (around 8/1/2019) for Recheck.

## 2019-07-01 NOTE — ASSESSMENT & PLAN NOTE
Hypertension is Almost 2 well-controlled.  We will change therapy to telmisartan 10 mg with 1 month follow-up..    Blood pressure will be reassessed in 1 month.

## 2019-07-10 LAB
MYCOBACTERIUM SPEC CULT: NORMAL
NIGHT BLUE STAIN TISS: NORMAL

## 2019-08-07 ENCOUNTER — OFFICE VISIT (OUTPATIENT)
Dept: FAMILY MEDICINE CLINIC | Facility: CLINIC | Age: 79
End: 2019-08-07

## 2019-08-07 VITALS
DIASTOLIC BLOOD PRESSURE: 66 MMHG | HEART RATE: 71 BPM | BODY MASS INDEX: 24.2 KG/M2 | WEIGHT: 169 LBS | RESPIRATION RATE: 16 BRPM | HEIGHT: 70 IN | SYSTOLIC BLOOD PRESSURE: 111 MMHG | OXYGEN SATURATION: 97 %

## 2019-08-07 DIAGNOSIS — I10 ESSENTIAL HYPERTENSION: Primary | ICD-10-CM

## 2019-08-07 DIAGNOSIS — E03.9 ACQUIRED HYPOTHYROIDISM: ICD-10-CM

## 2019-08-07 PROCEDURE — 99213 OFFICE O/P EST LOW 20 MIN: CPT | Performed by: FAMILY MEDICINE

## 2019-08-07 RX ORDER — BISACODYL 5 MG/1
5 TABLET, DELAYED RELEASE ORAL DAILY PRN
COMMUNITY

## 2019-08-07 RX ORDER — LEVOTHYROXINE SODIUM 0.2 MG/1
200 TABLET ORAL DAILY
Qty: 90 TABLET | Refills: 1 | Status: SHIPPED | OUTPATIENT
Start: 2019-08-07 | End: 2020-03-05 | Stop reason: SDUPTHER

## 2019-08-07 NOTE — PROGRESS NOTES
"Chief Complaint:   Subjective    Rooming Tab(CC,VS,Pt Hx,Fall Screen)   Chief Complaint   Patient presents with   • Hypertension     1 mo follow up    • Hyperlipidemia         History of Present Illness   Ismael Ball is a 79 y.o. male who presents to the office today to recheck blood pressure.  It is controlled.  No medication side effects at 10 mg dose.  Thyroid is stable.    I have reviewed and updated his medications, medical history and problem list during today's office visit.     Problem List Tab  Medications Tab  Synopsis Tab  Chart Review Tab  Care Everywhere Tab  Immunizations Tab  Patient History Tab  Social History     Tobacco Use   • Smoking status: Former Smoker     Types: Cigars   • Smokeless tobacco: Never Used   • Tobacco comment: cigar quit 40 yrs ago   Substance Use Topics   • Alcohol use: Yes     Comment: A GLASS OF WINE ONCE A MONTH        Review of Systems   Constitutional: Negative for fatigue.   Cardiovascular: Negative for chest pain.         Physical Examination:   Objective   Rooming Tab(CC,VS,Pt Hx,Fall Screen)  /66   Pulse 71   Resp 16   Ht 177.8 cm (70\")   Wt 76.7 kg (169 lb)   SpO2 97%   BMI 24.25 kg/m²     Body mass index is 24.25 kg/m².    Physical Exam   Constitutional: He is oriented to person, place, and time. No distress.   Cardiovascular: Normal rate and normal heart sounds.   Pulmonary/Chest: Effort normal and breath sounds normal.   Neurological: He is alert and oriented to person, place, and time.   Skin: He is not diaphoretic.   Psychiatric: He has a normal mood and affect. His speech is normal. He is attentive.   Vitals reviewed.       Data Reviewed:              Lab Results   Component Value Date     (H) 07/17/2019    BUN 16 07/17/2019    CREATININE 0.8 07/17/2019     07/17/2019    K 4.3 07/17/2019     07/17/2019    CALCIUM 9.8 07/17/2019    ALBUMIN 3.4 (L) 07/17/2019    BILITOT 0.7 07/17/2019    ALKPHOS 105 07/17/2019    AST 23 07/17/2019 "    ALT 17 07/17/2019    WBC 4.41 (L) 07/17/2019    RBC 4.26 (L) 07/17/2019    HCT 38.1 (L) 07/17/2019    MCV 89.4 07/17/2019    MCH 29.6 07/17/2019          Assessment/Plan:   Assessment/Plan   Order Review Tab  Health Maintenance Tab  Patient Plan/Order Tab  Diagnoses and all orders for this visit:    1. Essential hypertension (Primary)  Assessment & Plan:  Hypertension is improving with treatment.  Continue current treatment regimen.  Blood pressure will be reassessed at the next regular appointment.      2. Acquired hypothyroidism  Assessment & Plan:  The current medical regimen is effective;  continue present plan and medications.      Orders:  -     levothyroxine (SYNTHROID, LEVOTHROID) 200 MCG tablet; Take 1 tablet by mouth Daily for 180 days.  Dispense: 90 tablet; Refill: 1     Follow up:   Wrapup Tab  Return in about 6 months (around 2/7/2020) for Medicare Wellness and regular visit, 30 minutes.

## 2019-09-04 ENCOUNTER — TELEPHONE (OUTPATIENT)
Dept: GASTROENTEROLOGY | Facility: CLINIC | Age: 79
End: 2019-09-04

## 2019-09-04 NOTE — TELEPHONE ENCOUNTER
----- Message from Melody Stephen sent at 9/4/2019  3:34 PM EDT -----  Regarding: Dr Mooney consult   Contact: [phone]  Dr Mooney wants to talk with Dr Evangelista regarding mutual patient please call  at [phone]

## 2019-09-16 ENCOUNTER — TELEPHONE (OUTPATIENT)
Dept: GASTROENTEROLOGY | Facility: CLINIC | Age: 79
End: 2019-09-16

## 2019-09-16 NOTE — TELEPHONE ENCOUNTER
No CT is been done at [facility] since March 2019 and no labs have been done since July.  No indication for repeat stent.  Not certain where the study was done but care everywhere shows none.  Patient should get LFTs done and follow-up in the office.

## 2019-09-16 NOTE — TELEPHONE ENCOUNTER
Vita from Dr. Trevino's office([facility]) called and would like the patient to have his stent replaced based on recent scan that was done at [facility]. Asked she fax over the most recent office visit and the scan which the decision was based upon. She states she will fax them over now.

## 2019-09-17 NOTE — TELEPHONE ENCOUNTER
Called placed to Evelin. She states she is unable to get the CT to upload. She states she will have a disc made and send it to the office via [employer].

## 2019-09-17 NOTE — TELEPHONE ENCOUNTER
Called [facility]) and spoke with Evelin [email or web address] in the CT department. She states she will need to upload the CT scan to Care Everywhere and it will take about 10 minutes.

## 2019-09-20 ENCOUNTER — TELEPHONE (OUTPATIENT)
Dept: GASTROENTEROLOGY | Facility: CLINIC | Age: 79
End: 2019-09-20

## 2019-09-20 DIAGNOSIS — K83.1 BILE DUCT OBSTRUCTION: ICD-10-CM

## 2019-09-20 DIAGNOSIS — R19.7 DIARRHEA, UNSPECIFIED TYPE: Primary | ICD-10-CM

## 2019-09-20 NOTE — TELEPHONE ENCOUNTER
Returned patient's phone call. Advised Dr. Evangelista(per verbal order) recommends he repeat his ERCP for replacing his stent. Patient is agreeable to the plan. Order placed for ERCP

## 2019-09-20 NOTE — TELEPHONE ENCOUNTER
----- Message from Melody Stephen sent at 9/20/2019 11:19 AM EDT -----  Regarding: call back/stent replacemnet/ CT  Contact: [phone]  Pt is calling for status of what he needs to do.

## 2019-09-30 ENCOUNTER — APPOINTMENT (OUTPATIENT)
Dept: GENERAL RADIOLOGY | Facility: HOSPITAL | Age: 79
End: 2019-09-30

## 2019-09-30 ENCOUNTER — ANESTHESIA (OUTPATIENT)
Dept: GASTROENTEROLOGY | Facility: HOSPITAL | Age: 79
End: 2019-09-30

## 2019-09-30 ENCOUNTER — ANESTHESIA EVENT (OUTPATIENT)
Dept: GASTROENTEROLOGY | Facility: HOSPITAL | Age: 79
End: 2019-09-30

## 2019-09-30 ENCOUNTER — HOSPITAL ENCOUNTER (OUTPATIENT)
Facility: HOSPITAL | Age: 79
Setting detail: HOSPITAL OUTPATIENT SURGERY
Discharge: HOME | End: 2019-09-30
Attending: INTERNAL MEDICINE | Admitting: INTERNAL MEDICINE

## 2019-09-30 VITALS
WEIGHT: 157.44 LBS | DIASTOLIC BLOOD PRESSURE: 73 MMHG | SYSTOLIC BLOOD PRESSURE: 142 MMHG | BODY MASS INDEX: 22.54 KG/M2 | RESPIRATION RATE: 18 BRPM | OXYGEN SATURATION: 100 % | HEIGHT: 70 IN | TEMPERATURE: 97.2 F | HEART RATE: 60 BPM

## 2019-09-30 DIAGNOSIS — K83.1 BILE DUCT OBSTRUCTION: ICD-10-CM

## 2019-09-30 PROCEDURE — S0260 H&P FOR SURGERY: HCPCS | Performed by: INTERNAL MEDICINE

## 2019-09-30 PROCEDURE — 25010000002 PROPOFOL 10 MG/ML EMULSION: Performed by: ANESTHESIOLOGY

## 2019-09-30 PROCEDURE — 43264 ERCP REMOVE DUCT CALCULI: CPT | Performed by: INTERNAL MEDICINE

## 2019-09-30 PROCEDURE — C1874 STENT, COATED/COV W/DEL SYS: HCPCS | Performed by: INTERNAL MEDICINE

## 2019-09-30 PROCEDURE — 74328 X-RAY BILE DUCT ENDOSCOPY: CPT

## 2019-09-30 PROCEDURE — C1894 INTRO/SHEATH, NON-LASER: HCPCS | Performed by: INTERNAL MEDICINE

## 2019-09-30 PROCEDURE — 43276 ERCP STENT EXCHANGE W/DILATE: CPT | Performed by: INTERNAL MEDICINE

## 2019-09-30 PROCEDURE — C1769 GUIDE WIRE: HCPCS | Performed by: INTERNAL MEDICINE

## 2019-09-30 DEVICE — STENT SYSTEM RMV
Type: IMPLANTABLE DEVICE | Site: BILE DUCT | Status: FUNCTIONAL
Brand: WALLFLEX BILIARY

## 2019-09-30 RX ORDER — SODIUM CHLORIDE 0.9 % (FLUSH) 0.9 %
3 SYRINGE (ML) INJECTION EVERY 12 HOURS SCHEDULED
Status: DISCONTINUED | OUTPATIENT
Start: 2019-09-30 | End: 2019-09-30 | Stop reason: HOSPADM

## 2019-09-30 RX ORDER — PROPOFOL 10 MG/ML
VIAL (ML) INTRAVENOUS CONTINUOUS PRN
Status: DISCONTINUED | OUTPATIENT
Start: 2019-09-30 | End: 2019-09-30 | Stop reason: SURG

## 2019-09-30 RX ORDER — PROPOFOL 10 MG/ML
VIAL (ML) INTRAVENOUS AS NEEDED
Status: DISCONTINUED | OUTPATIENT
Start: 2019-09-30 | End: 2019-09-30 | Stop reason: SURG

## 2019-09-30 RX ORDER — SODIUM CHLORIDE 0.9 % (FLUSH) 0.9 %
10 SYRINGE (ML) INJECTION AS NEEDED
Status: DISCONTINUED | OUTPATIENT
Start: 2019-09-30 | End: 2019-09-30 | Stop reason: HOSPADM

## 2019-09-30 RX ORDER — SODIUM CHLORIDE, SODIUM LACTATE, POTASSIUM CHLORIDE, CALCIUM CHLORIDE 600; 310; 30; 20 MG/100ML; MG/100ML; MG/100ML; MG/100ML
30 INJECTION, SOLUTION INTRAVENOUS CONTINUOUS PRN
Status: DISCONTINUED | OUTPATIENT
Start: 2019-09-30 | End: 2019-09-30 | Stop reason: HOSPADM

## 2019-09-30 RX ORDER — LIDOCAINE HYDROCHLORIDE 20 MG/ML
INJECTION, SOLUTION INFILTRATION; PERINEURAL AS NEEDED
Status: DISCONTINUED | OUTPATIENT
Start: 2019-09-30 | End: 2019-09-30 | Stop reason: SURG

## 2019-09-30 RX ADMIN — SODIUM CHLORIDE, POTASSIUM CHLORIDE, SODIUM LACTATE AND CALCIUM CHLORIDE 30 ML/HR: 600; 310; 30; 20 INJECTION, SOLUTION INTRAVENOUS at 11:28

## 2019-09-30 RX ADMIN — PROPOFOL 100 MCG/KG/MIN: 10 INJECTION, EMULSION INTRAVENOUS at 12:26

## 2019-09-30 RX ADMIN — LIDOCAINE HYDROCHLORIDE 60 MG: 20 INJECTION, SOLUTION INFILTRATION; PERINEURAL at 12:26

## 2019-09-30 RX ADMIN — PROPOFOL 100 MG: 10 INJECTION, EMULSION INTRAVENOUS at 12:26

## 2019-09-30 NOTE — ANESTHESIA POSTPROCEDURE EVALUATION
"Patient: Ismael Ball    Procedure Summary     Date:  09/30/19 [facility] / [facility]    Anesthesia Start:  1223 Anesthesia Stop:  1258    Procedure:  ENDOSCOPIC RETROGRADE CHOLANGIOPANCREATOGRAPHY WITH  BILIARY STENT REPLACEMENT (10MM X60MM) (N/A ) Diagnosis:       Bile duct obstruction      (Bile duct obstruction [K83.1])    Surgeon:  Oscar Evangelista MD Provider:  Brian Fermin MD    Anesthesia Type:  MAC ASA Status:  3          Anesthesia Type: MAC  Last vitals  BP   142/73 (09/30/19 1316)   Temp   36.2 °C (97.2 °F) (09/30/19 1256)   Pulse   60 (09/30/19 1316)   Resp   18 (09/30/19 1316)     SpO2   100 % (09/30/19 1316)     Post Anesthesia Care and Evaluation    Patient location during evaluation: PACU  Patient participation: complete - patient participated  Level of consciousness: awake  Pain score: 0  Pain management: adequate  Airway patency: patent  Anesthetic complications: No anesthetic complications  PONV Status: none  Cardiovascular status: acceptable  Respiratory status: acceptable  Hydration status: acceptable    Comments: /73 (BP Location: Left arm, Patient Position: Lying)   Pulse 60   Temp 36.2 °C (97.2 °F) (Oral)   Resp 18   Ht 177.8 cm (70\")   Wt 71.4 kg (157 lb 7 oz)   SpO2 100%   BMI 22.59 kg/m²       "

## 2019-09-30 NOTE — ANESTHESIA PREPROCEDURE EVALUATION
Anesthesia Evaluation     Patient summary reviewed and Nursing notes reviewed   history of anesthetic complications: PONV               Airway   Mallampati: I  TM distance: >3 FB  Neck ROM: full  No difficulty expected  Dental - normal exam     Pulmonary - normal exam   (+) pneumonia , a smoker Former, shortness of breath,   Cardiovascular - normal exam    ECG reviewed    (+) hypertension, hyperlipidemia,       Neuro/Psych  (+) CVA, dizziness/light headedness, weakness,     GI/Hepatic/Renal/Endo    (+)   hypothyroidism,     Musculoskeletal     Abdominal  - normal exam    Bowel sounds: normal.   Substance History - negative use     OB/GYN negative ob/gyn ROS         Other   (+) arthritis   history of cancer                    Anesthesia Plan    ASA 3     MAC     Anesthetic plan, all risks, benefits, and alternatives have been provided, discussed and informed consent has been obtained with: patient.

## 2019-09-30 NOTE — ANESTHESIA POSTPROCEDURE EVALUATION
"Patient: Ismael Ball    Procedure Summary     Date:  09/30/19 [facility]    Anesthesia Start:  1223 Anesthesia Stop:  1258    Procedure:  ENDOSCOPIC RETROGRADE CHOLANGIOPANCREATOGRAPHY WITH  BILIARY STENT REPLACEMENT (10MM X60MM) (N/A ) Diagnosis:       Bile duct obstruction      (Bile duct obstruction [K83.1])    Surgeon:  Oscar Evangelista MD Provider:  Brian Fermin MD    Anesthesia Type:  MAC ASA Status:  3          Anesthesia Type: MAC  Last vitals  BP   142/73 (09/30/19 1316)   Temp   36.2 °C (97.2 °F) (09/30/19 1256)   Pulse   60 (09/30/19 1316)   Resp   18 (09/30/19 1316)     SpO2   100 % (09/30/19 1316)     Post Anesthesia Care and Evaluation    Patient location during evaluation: PACU  Patient participation: complete - patient participated  Level of consciousness: awake  Pain score: 0  Pain management: adequate  Airway patency: patent  Anesthetic complications: No anesthetic complications  PONV Status: none  Cardiovascular status: acceptable  Respiratory status: acceptable  Hydration status: acceptable    Comments: /73 (BP Location: Left arm, Patient Position: Lying)   Pulse 60   Temp 36.2 °C (97.2 °F) (Oral)   Resp 18   Ht 177.8 cm (70\")   Wt 71.4 kg (157 lb 7 oz)   SpO2 100%   BMI 22.59 kg/m²       "

## 2019-10-02 DIAGNOSIS — I10 ESSENTIAL HYPERTENSION: ICD-10-CM

## 2019-10-02 RX ORDER — TELMISARTAN 20 MG/1
TABLET ORAL
Qty: 90 TABLET | Refills: 1 | Status: SHIPPED | OUTPATIENT
Start: 2019-10-02 | End: 2020-03-05

## 2019-11-11 ENCOUNTER — TRANSCRIBE ORDERS (OUTPATIENT)
Dept: ADMINISTRATIVE | Facility: HOSPITAL | Age: 79
End: 2019-11-11

## 2019-11-11 DIAGNOSIS — R06.02 SOB (SHORTNESS OF BREATH): Primary | ICD-10-CM

## 2019-11-26 ENCOUNTER — OFFICE VISIT (OUTPATIENT)
Dept: GASTROENTEROLOGY | Facility: CLINIC | Age: 79
End: 2019-11-26

## 2019-11-26 VITALS
SYSTOLIC BLOOD PRESSURE: 102 MMHG | DIASTOLIC BLOOD PRESSURE: 68 MMHG | TEMPERATURE: 97.4 F | BODY MASS INDEX: 22.9 KG/M2 | WEIGHT: 160 LBS | HEIGHT: 70 IN

## 2019-11-26 DIAGNOSIS — K83.1 BILE DUCT OBSTRUCTION: Primary | ICD-10-CM

## 2019-11-26 PROCEDURE — 99212 OFFICE O/P EST SF 10 MIN: CPT | Performed by: INTERNAL MEDICINE

## 2019-11-26 RX ORDER — ASPIRIN 81 MG/1
81 TABLET ORAL DAILY
COMMUNITY
End: 2020-03-05 | Stop reason: SDUPTHER

## 2019-11-26 NOTE — PROGRESS NOTES
Chief Complaint   Patient presents with   • ERCP follow up       Ismael Ball is a  79 y.o. male here for a follow up visit for bile duct obstruction.    HPI     Patient 79-year-old male status post stenting noted with obstruction of the stent related to biliary debris.  Patient's stent was removed the duct was swept clear and a new stent was reinserted.  Patient currently denies complaints but did have an exacerbation in his PSA levels.  Patient back on chemo for prostate cancer reports doing well.  Patient had repeat labs in November 22 which showed normal AST ALT and bilirubin.  Minimal alkaline phosphatase elevation was noted.    Past Medical History:   Diagnosis Date   • Abdominal pain    • Acute cholangitis 4/25/2019   • Anemia    • Aneurysm of ascending aorta (CMS/HCC)    • Arthritis    • Ascending cholangitis    • Aspiration pneumonia of right lower lobe (CMS/HCC)    • Bacteremia due to Escherichia coli 4/25/2019   • Biliary stent obstruction 4/24/2019   • Biliary stricture    • Cancer (CMS/HCC)    • Chills    • Cholangitis    • Chronic adrenal insufficiency (CMS/HCC)    • CVA (cerebral vascular accident) (CMS/HCC)    • Diaphoresis    • Diarrhea    • Diverticulosis of colon    • Dizziness    • E coli infection 2017    blood   • Elevated brain natriuretic peptide (BNP) level    • Elevated cholesterol    • Elevated liver function tests    • Elevated liver function tests 4/24/2019   • Fatigue    • Fever    • Herpes labialis    • History of pneumonia     MARCH 2017   • History of transfusion    • Hyperlipidemia    • Hypertension    • Hypothyroidism    • Metastatic disease (CMS/HCC)    • PONV (postoperative nausea and vomiting)    • Prostate cancer (CMS/HCC)     with bony metastases   • Septic shock (CMS/HCC)    • Shortness of breath    • Vomiting    • Weakness          Current Outpatient Medications:   •  aspirin 81 MG EC tablet, Take 81 mg by mouth Daily., Disp: , Rfl:   •  Calcium Carb-Cholecalciferol  (CALCIUM-VITAMIN D3) 600-400 MG-UNIT tablet, Take 2 tablets by mouth Daily., Disp: , Rfl:   •  levothyroxine (SYNTHROID, LEVOTHROID) 200 MCG tablet, Take 1 tablet by mouth Daily for 180 days., Disp: 90 tablet, Rfl: 1  •  telmisartan (MICARDIS) 20 MG tablet, Take 0.5 tablets by mouth Every Night for 180 days., Disp: 45 tablet, Rfl: 1  •  aspirin 81 MG tablet, Take 1 tablet by mouth Daily for 30 days., Disp: 30 tablet, Rfl: 0  •  bisacodyl (DULCOLAX) 5 MG EC tablet, Take 5 mg by mouth Daily As Needed for Constipation., Disp: , Rfl:   •  Enzalutamide (XTANDI PO), Take  by mouth 2 (Two) Times a Day., Disp: , Rfl:   •  Glucosamine-Chondroitin-Ca-D3 (TRIPLE FLEX 50+ PO), Take  by mouth., Disp: , Rfl:   •  telmisartan (MICARDIS) 20 MG tablet, TAKE 1 TABLET BY MOUTH EVERY NIGHT, Disp: 90 tablet, Rfl: 1    Allergies   Allergen Reactions   • Penicillins      Unknown, many years ago per pt when he was a child; Tolerated cephalosporins (cefepime) during 3/2017 admission.    • Statins Myalgia       Social History     Socioeconomic History   • Marital status:      Spouse name: Not on file   • Number of children: Not on file   • Years of education: Not on file   • Highest education level: Not on file   Tobacco Use   • Smoking status: Former Smoker     Types: Cigars   • Smokeless tobacco: Never Used   • Tobacco comment: cigar quit 40 yrs ago   Substance and Sexual Activity   • Alcohol use: Yes     Comment: A GLASS OF WINE ONCE A MONTH    • Drug use: Defer   • Sexual activity: Defer       Family History   Problem Relation Age of Onset   • Arthritis Mother    • Hypertension Mother    • Arthritis Father    • Cancer Father    • Heart disease Father    • Heart attack Father    • Heart disease Sister        Review of Systems   Constitutional: Negative.    Respiratory: Negative.    Cardiovascular: Negative.    Gastrointestinal: Negative.    Musculoskeletal: Negative.    Skin: Negative.    Hematological: Negative.        Vitals:     11/26/19 1315   BP: 102/68   Temp: 97.4 °F (36.3 °C)       Physical Exam   Constitutional: He is oriented to person, place, and time. He appears well-developed and well-nourished.   HENT:   Head: Normocephalic and atraumatic.   Eyes: Pupils are equal, round, and reactive to light. No scleral icterus.   Cardiovascular: Normal rate, regular rhythm and normal heart sounds.   Pulmonary/Chest: Effort normal and breath sounds normal.   Abdominal: Soft. Bowel sounds are normal. He exhibits no distension and no mass. There is no tenderness. No hernia.   Neurological: He is alert and oriented to person, place, and time.   Skin: Skin is warm and dry. No rash noted.   Psychiatric: He has a normal mood and affect. His behavior is normal.   Vitals reviewed.      No visits with results within 2 Month(s) from this visit.   Latest known visit with results is:   Admission on 05/29/2019, Discharged on 05/29/2019   Component Date Value Ref Range Status   • Fungus Culture 05/29/2019 No fungus isolated at 4 weeks   Final   • AFB Culture 05/29/2019 No AFB isolated at 6 weeks   Final   • AFB Stain 05/29/2019 No acid fast bacilli seen on concentrated smear   Final   • Respiratory Culture 05/29/2019 No growth   Final   • Gram Stain 05/29/2019 No WBCs or organisms seen   Final   • Case Report 05/29/2019    Final                    Value:Non-gynecologic Cytology                          Case: [id number]                                  Authorizing Provider:  Anjum Reza MD    Collected:           05/29/2019 08:39 AM          Ordering Location:     [facility]  Received:            05/29/2019 12:17 PM                                 [facility]                                                                  Pathologist:           Fercho Guerrero MD                                                     Specimens:   1) - Lung, Right Lower Lobe, Brushing                                                               2)  - Lung, Right Lower Lobe, BAL                                                          • Final Diagnosis 05/29/2019    Final                    Value:This result contains rich text formatting which cannot be displayed here.   • Gross Description 05/29/2019    Final                    Value:This result contains rich text formatting which cannot be displayed here.   • Microscopic Description 05/29/2019    Final                    Value:This result contains rich text formatting which cannot be displayed here.   • Fungus Culture 05/29/2019 No fungus isolated at 4 weeks   Final   • Fungal Stain 05/29/2019 No fungal elements seen   Final   • BAL Culture 05/29/2019 No growth   Final   • Gram Stain 05/29/2019 Rare (1+) WBCs seen   Final   • Gram Stain 05/29/2019 No organisms seen   Final   • Color, Fluid 05/29/2019 Colorless   Final   • Appearance, Fluid 05/29/2019 Clear  Clear Final   • WBC, Fluid 05/29/2019 11  /mm3 Final   • RBC, Fluid 05/29/2019 192  /mm3 Final   • Neutrophils, Fluid 05/29/2019 38  % Final   • Lymphocytes, Fluid 05/29/2019 6  % Final   • Monocytes, Fluid 05/29/2019 2  % Final   • Eosinophils, Fluid 05/29/2019 1  % Final   • Mononuclear, Fluid 05/29/2019 53  % Final   • Other Cells/100 WBCs, Fluid 05/29/2019 37  /100 WBCs Final       Ismael was seen today for ercp follow up.    Diagnoses and all orders for this visit:    Bile duct obstruction      Patient status post stent change feeling well without complaint.  Patient was noted recently to have a significant bump in his PSA value and is being treated again for prostate cancer exacerbation but doing well.  Repeat labs last week revealed LFTs were normal with minimal elevation in alkaline phosphatase.  We will continue to monitor LFTs, repeat again in 2 to 3 weeks if everything stable we will plan on repeating again in January.

## 2020-01-14 ENCOUNTER — OFFICE VISIT (OUTPATIENT)
Dept: GASTROENTEROLOGY | Facility: CLINIC | Age: 80
End: 2020-01-14

## 2020-01-14 VITALS
BODY MASS INDEX: 23.19 KG/M2 | WEIGHT: 162 LBS | DIASTOLIC BLOOD PRESSURE: 62 MMHG | TEMPERATURE: 97.4 F | SYSTOLIC BLOOD PRESSURE: 118 MMHG | HEIGHT: 70 IN

## 2020-01-14 DIAGNOSIS — K83.1 BILE DUCT OBSTRUCTION: Primary | ICD-10-CM

## 2020-01-14 PROCEDURE — 99212 OFFICE O/P EST SF 10 MIN: CPT | Performed by: INTERNAL MEDICINE

## 2020-01-14 RX ORDER — ONDANSETRON 4 MG/1
TABLET, ORALLY DISINTEGRATING ORAL
COMMUNITY
Start: 2019-12-19

## 2020-01-14 RX ORDER — CYCLOPHOSPHAMIDE 50 MG/1
250 CAPSULE ORAL
COMMUNITY
Start: 2019-10-21

## 2020-01-14 NOTE — PROGRESS NOTES
Chief Complaint   Patient presents with   • Bile duct obstruction       Ismael Ball is a  79 y.o. male here for a follow up visit for obstructive jaundice.    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  Patient for currently feeling well with no complaints.  Patient reports stools are normal with normal-appearing urine no evidence of recurrent jaundice apparent.  Patient feeling improved with good appetite.    Past Medical History:   Diagnosis Date   • Abdominal pain    • Acute cholangitis 4/25/2019   • Anemia    • Aneurysm of ascending aorta (CMS/HCC)    • Arthritis    • Ascending cholangitis    • Aspiration pneumonia of right lower lobe (CMS/HCC)    • Bacteremia due to Escherichia coli 4/25/2019   • Biliary stent obstruction 4/24/2019   • Biliary stricture    • Cancer (CMS/HCC)    • Chills    • Cholangitis    • Chronic adrenal insufficiency (CMS/HCC)    • CVA (cerebral vascular accident) (CMS/HCC)    • Diaphoresis    • Diarrhea    • Diverticulosis of colon    • Dizziness    • E coli infection 2017    blood   • Elevated brain natriuretic peptide (BNP) level    • Elevated cholesterol    • Elevated liver function tests    • Elevated liver function tests 4/24/2019   • Fatigue    • Fever    • Herpes labialis    • History of pneumonia     MARCH 2017   • History of transfusion    • Hyperlipidemia    • Hypertension    • Hypothyroidism    • Metastatic disease (CMS/HCC)    • PONV (postoperative nausea and vomiting)    • Prostate cancer (CMS/HCC)     with bony metastases   • Septic shock (CMS/HCC)    • Shortness of breath    • Vomiting    • Weakness          Current Outpatient Medications:   •  aspirin 81 MG EC tablet, Take 81 mg by mouth Daily., Disp: , Rfl:   •  bisacodyl (DULCOLAX) 5 MG EC tablet, Take 5 mg by mouth Daily As Needed for Constipation., Disp: , Rfl:   •  Calcium Carb-Cholecalciferol (CALCIUM-VITAMIN D3)  600-400 MG-UNIT tablet, Take 2 tablets by mouth Daily., Disp: , Rfl:   •  cyclophosphamide 50 MG capsule, Take 250 mg by mouth Daily., Disp: , Rfl:   •  levothyroxine (SYNTHROID, LEVOTHROID) 200 MCG tablet, Take 1 tablet by mouth Daily for 180 days., Disp: 90 tablet, Rfl: 1  •  ondansetron ODT (ZOFRAN-ODT) 4 MG disintegrating tablet, , Disp: , Rfl:   •  telmisartan (MICARDIS) 20 MG tablet, TAKE 1 TABLET BY MOUTH EVERY NIGHT (Patient taking differently: Take 0.5 tablets every evening), Disp: 90 tablet, Rfl: 1  •  aspirin 81 MG tablet, Take 1 tablet by mouth Daily for 30 days., Disp: 30 tablet, Rfl: 0  •  Enzalutamide (XTANDI PO), Take  by mouth 2 (Two) Times a Day., Disp: , Rfl:   •  telmisartan (MICARDIS) 20 MG tablet, Take 0.5 tablets by mouth Every Night for 180 days., Disp: 45 tablet, Rfl: 1    Allergies   Allergen Reactions   • Penicillins Other (See Comments)     Unknown, many years ago per pt when he was a child; Tolerated cephalosporins (cefepime) during 3/2017 admission.    • Statins Myalgia       Social History     Socioeconomic History   • Marital status:      Spouse name: Not on file   • Number of children: Not on file   • Years of education: Not on file   • Highest education level: Not on file   Tobacco Use   • Smoking status: Former Smoker     Types: Cigars   • Smokeless tobacco: Never Used   • Tobacco comment: cigar quit 40 yrs ago   Substance and Sexual Activity   • Alcohol use: Yes     Comment: A GLASS OF WINE ONCE A MONTH    • Drug use: Defer   • Sexual activity: Defer       Family History   Problem Relation Age of Onset   • Arthritis Mother    • Hypertension Mother    • Arthritis Father    • Cancer Father    • Heart disease Father    • Heart attack Father    • Heart disease Sister        Review of Systems   Constitutional: Negative.    HENT: Negative.    Eyes: Negative.    Respiratory: Negative.    Cardiovascular: Negative.    Skin: Negative.    Hematological: Negative.        Vitals:     01/14/20 1405   BP: 118/62   Temp: 97.4 °F (36.3 °C)       Physical Exam   Constitutional: He is oriented to person, place, and time. He appears well-developed and well-nourished.   HENT:   Head: Normocephalic and atraumatic.   Eyes: Pupils are equal, round, and reactive to light. No scleral icterus.   Cardiovascular: Normal rate, regular rhythm and normal heart sounds. Exam reveals no gallop and no friction rub.   No murmur heard.  Pulmonary/Chest: Effort normal.   Abdominal: Soft. Bowel sounds are normal. He exhibits no distension and no mass. There is no tenderness. No hernia.   Neurological: He is alert and oriented to person, place, and time.   Skin: Skin is warm and dry. No rash noted.   Psychiatric: He has a normal mood and affect. His behavior is normal.   Vitals reviewed.      No visits with results within 2 Month(s) from this visit.   Latest known visit with results is:   Admission on 05/29/2019, Discharged on 05/29/2019   Component Date Value Ref Range Status   • Fungus Culture 05/29/2019 No fungus isolated at 4 weeks   Final   • AFB Culture 05/29/2019 No AFB isolated at 6 weeks   Final   • AFB Stain 05/29/2019 No acid fast bacilli seen on concentrated smear   Final   • Respiratory Culture 05/29/2019 No growth   Final   • Gram Stain 05/29/2019 No WBCs or organisms seen   Final   • Case Report 05/29/2019    Final                    Value:Non-gynecologic Cytology                          Case: [id number]                                  Authorizing Provider:  Anjum Reza MD    Collected:           05/29/2019 08:39 AM          Ordering Location:     [facility]  Received:            05/29/2019 12:17 PM                                 [facility]                                                                  Pathologist:           Fercho Guerrero MD                                                     Specimens:   1) - Lung, Right Lower Lobe, Brushing                                                                2) - Lung, Right Lower Lobe, BAL                                                          • Final Diagnosis 05/29/2019    Final                    Value:This result contains rich text formatting which cannot be displayed here.   • Gross Description 05/29/2019    Final                    Value:This result contains rich text formatting which cannot be displayed here.   • Microscopic Description 05/29/2019    Final                    Value:This result contains rich text formatting which cannot be displayed here.   • Fungus Culture 05/29/2019 No fungus isolated at 4 weeks   Final   • Fungal Stain 05/29/2019 No fungal elements seen   Final   • BAL Culture 05/29/2019 No growth   Final   • Gram Stain 05/29/2019 Rare (1+) WBCs seen   Final   • Gram Stain 05/29/2019 No organisms seen   Final   • Color, Fluid 05/29/2019 Colorless   Final   • Appearance, Fluid 05/29/2019 Clear  Clear Final   • WBC, Fluid 05/29/2019 11  /mm3 Final   • RBC, Fluid 05/29/2019 192  /mm3 Final   • Neutrophils, Fluid 05/29/2019 38  % Final   • Lymphocytes, Fluid 05/29/2019 6  % Final   • Monocytes, Fluid 05/29/2019 2  % Final   • Eosinophils, Fluid 05/29/2019 1  % Final   • Mononuclear, Fluid 05/29/2019 53  % Final   • Other Cells/100 WBCs, Fluid 05/29/2019 37  /100 WBCs Final       Ismael was seen today for bile duct obstruction.    Diagnoses and all orders for this visit:    Bile duct obstruction      79-year-old male with history of prostate cancer found with obstructive jaundice.  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  Recurrent stent obstruction required stent change now asymptomatic.  Patient with no jaundice labs performed yesterday showed normal LFTs.  Patient feeling well with actually decreasing PSA levels.  We will continue to monitor LFTs and if jaundice or change in labs occur to suggest recurrent obstruction will repeat ERCP to clear the stent.  We will continue to  follow clinically.

## 2020-03-05 ENCOUNTER — OFFICE VISIT (OUTPATIENT)
Dept: FAMILY MEDICINE CLINIC | Facility: CLINIC | Age: 80
End: 2020-03-05

## 2020-03-05 VITALS
BODY MASS INDEX: 22.48 KG/M2 | SYSTOLIC BLOOD PRESSURE: 104 MMHG | TEMPERATURE: 97.9 F | OXYGEN SATURATION: 96 % | HEART RATE: 68 BPM | RESPIRATION RATE: 16 BRPM | WEIGHT: 157 LBS | HEIGHT: 70 IN | DIASTOLIC BLOOD PRESSURE: 62 MMHG

## 2020-03-05 DIAGNOSIS — Z86.73 HISTORY OF CVA (CEREBROVASCULAR ACCIDENT): ICD-10-CM

## 2020-03-05 DIAGNOSIS — I10 ESSENTIAL HYPERTENSION: ICD-10-CM

## 2020-03-05 DIAGNOSIS — E78.2 MIXED HYPERLIPIDEMIA: ICD-10-CM

## 2020-03-05 DIAGNOSIS — Z00.00 MEDICARE ANNUAL WELLNESS VISIT, SUBSEQUENT: Primary | ICD-10-CM

## 2020-03-05 DIAGNOSIS — E03.9 ACQUIRED HYPOTHYROIDISM: ICD-10-CM

## 2020-03-05 PROCEDURE — 99214 OFFICE O/P EST MOD 30 MIN: CPT | Performed by: FAMILY MEDICINE

## 2020-03-05 PROCEDURE — 96160 PT-FOCUSED HLTH RISK ASSMT: CPT | Performed by: FAMILY MEDICINE

## 2020-03-05 PROCEDURE — G0439 PPPS, SUBSEQ VISIT: HCPCS | Performed by: FAMILY MEDICINE

## 2020-03-05 RX ORDER — LEVOTHYROXINE SODIUM 0.2 MG/1
200 TABLET ORAL DAILY
Qty: 90 TABLET | Refills: 3 | Status: SHIPPED | OUTPATIENT
Start: 2020-03-05 | End: 2021-05-13 | Stop reason: SDUPTHER

## 2020-03-05 RX ORDER — CYCLOPHOSPHAMIDE 50 MG/1
200 CAPSULE ORAL DAILY
COMMUNITY
Start: 2020-02-27 | End: 2020-03-05 | Stop reason: SDUPTHER

## 2020-03-05 NOTE — PATIENT INSTRUCTIONS
Check on insurance coverage and cost for Shingrix (newest shingles vaccine) and get the immunization at your local pharmacy. It is more effective than the old Zostavax vaccine and is recommended even if you have had the Zostavax vaccine in the past. For more information, please look at the website below:    Https://www.cdc.gov/vaccines/vpd/shingles/public/shingrix/index.html    Please get me copy of Adacel Tdap (tetanus shot with whooping cough vaccine) from the pharmacy so we can update your records.      Fall Prevention in the Home, Adult  Falls can cause injuries and can affect people from all age groups. There are many simple things that you can do to make your home safe and to help prevent falls. Ask for help when making these changes, if needed.  What actions can I take to prevent falls?  General instructions  · Use good lighting in all rooms. Replace any light bulbs that burn out.  · Turn on lights if it is dark. Use night-lights.  · Place frequently used items in easy-to-reach places. Lower the shelves around your home if necessary.  · Set up furniture so that there are clear paths around it. Avoid moving your furniture around.  · Remove throw rugs and other tripping hazards from the floor.  · Avoid walking on wet floors.  · Fix any uneven floor surfaces.  · Add color or contrast paint or tape to grab bars and handrails in your home. Place contrasting color strips on the first and last steps of stairways.  · When you use a stepladder, make sure that it is completely opened and that the sides are firmly locked. Have someone hold the ladder while you are using it. Do not climb a closed stepladder.  · Be aware of any and all pets.  What can I do in the bathroom?         · Keep the floor dry. Immediately clean up any water that spills onto the floor.  · Remove soap buildup in the tub or shower on a regular basis.  · Use non-skid mats or decals on the floor of the tub or shower.  · Attach bath mats securely with  double-sided, non-slip rug tape.  · If you need to sit down while you are in the shower, use a plastic, non-slip stool.  · Install grab bars by the toilet and in the tub and shower. Do not use towel bars as grab bars.  What can I do in the bedroom?  · Make sure that a bedside light is easy to reach.  · Do not use oversized bedding that drapes onto the floor.  · Have a firm chair that has side arms to use for getting dressed.  What can I do in the kitchen?  · Clean up any spills right away.  · If you need to reach for something above you, use a sturdy step stool that has a grab bar.  · Keep electrical cables out of the way.  · Do not use floor polish or wax that makes floors slippery. If you must use wax, make sure that it is non-skid floor wax.  What can I do in the stairways?  · Do not leave any items on the stairs.  · Make sure that you have a light switch at the top of the stairs and the bottom of the stairs. Have them installed if you do not have them.  · Make sure that there are handrails on both sides of the stairs. Fix handrails that are broken or loose. Make sure that handrails are as long as the stairways.  · Install non-slip stair treads on all stairs in your home.  · Avoid having throw rugs at the top or bottom of stairways, or secure the rugs with carpet tape to prevent them from moving.  · Choose a carpet design that does not hide the edge of steps on the stairway.  · Check any carpeting to make sure that it is firmly attached to the stairs. Fix any carpet that is loose or worn.  What can I do on the outside of my home?  · Use bright outdoor lighting.  · Regularly repair the edges of walkways and driveways and fix any cracks.  · Remove high doorway thresholds.  · Trim any shrubbery on the main path into your home.  · Regularly check that handrails are securely fastened and in good repair. Both sides of any steps should have handrails.  · Install guardrails along the edges of any raised decks or  porches.  · Clear walkways of debris and clutter, including tools and rocks.  · Have leaves, snow, and ice cleared regularly.  · Use sand or salt on walkways during winter months.  · In the garage, clean up any spills right away, including grease or oil spills.  What other actions can I take?  · Wear closed-toe shoes that fit well and support your feet. Wear shoes that have rubber soles or low heels.  · Use mobility aids as needed, such as canes, walkers, scooters, and crutches.  · Review your medicines with your health care provider. Some medicines can cause dizziness or changes in blood pressure, which increase your risk of falling.  Talk with your health care provider about other ways that you can decrease your risk of falls. This may include working with a physical therapist or  to improve your strength, balance, and endurance.  Where to find more information  · Centers for Disease Control and Prevention, [place]: https://www.cdc.gov  · National [place] on Aging: https://rz1rkcv.cony.nih.gov  Contact a health care provider if:  · You are afraid of falling at home.  · You feel weak, drowsy, or dizzy at home.  · You fall at home.  Summary  · There are many simple things that you can do to make your home safe and to help prevent falls.  · Ways to make your home safe include removing tripping hazards and installing grab bars in the bathroom.  · Ask for help when making these changes in your home.  This information is not intended to replace advice given to you by your health care provider. Make sure you discuss any questions you have with your health care provider.  Document Released: 12/08/2003 Document Revised: 08/02/2018 Document Reviewed: 08/02/2018  [employer] Interactive Patient Education © 2020 [employer]      Advance Directive    Advance directives are legal documents that let you make choices ahead of time about your health care and medical treatment in case you become unable to communicate for  yourself. Advance directives are a way for you to communicate your wishes to family, friends, and health care providers. This can help convey your decisions about end-of-life care if you become unable to communicate.  Discussing and writing advance directives should happen over time rather than all at once. Advance directives can be changed depending on your situation and what you want, even after you have signed the advance directives.  If you do not have an advance directive, some states assign family decision makers to act on your behalf based on how closely you are related to them. Each state has its own laws regarding advance directives. You may want to check with your health care provider, , or state representative about the laws in your state. There are different types of advance directives, such as:  · Medical power of .  · Living will.  · Do not resuscitate (DNR) or do not attempt resuscitation (DNAR) order.  Health care proxy and medical power of   A health care proxy, also called a health care agent, is a person who is appointed to make medical decisions for you in cases in which you are unable to make the decisions yourself. Generally, people choose someone they know well and trust to represent their preferences. Make sure to ask this person for an agreement to act as your proxy. A proxy may have to exercise judgment in the event of a medical decision for which your wishes are not known.  A medical power of  is a legal document that names your health care proxy. Depending on the laws in your state, after the document is written, it may also need to be:  · Signed.  · Notarized.  · Dated.  · Copied.  · Witnessed.  · Incorporated into your medical record.  You may also want to appoint someone to manage your financial affairs in a situation in which you are unable to do so. This is called a durable power of  for finances. It is a separate legal document from the  durable power of  for health care. You may choose the same person or someone different from your health care proxy to act as your agent in financial matters.  If you do not appoint a proxy, or if there is a concern that the proxy is not acting in your best interests, a court-appointed guardian may be designated to act on your behalf.  Living will  A living will is a set of instructions documenting your wishes about medical care when you cannot express them yourself. Health care providers should keep a copy of your living will in your medical record. You may want to give a copy to family members or friends. To alert caregivers in case of an emergency, you can place a card in your wallet to let them know that you have a living will and where they can find it. A living will is used if you become:  · Terminally ill.  · Incapacitated.  · Unable to communicate or make decisions.  Items to consider in your living will include:  · The use or non-use of life-sustaining equipment, such as dialysis machines and breathing machines (ventilators).  · A DNR or DNAR order, which is the instruction not to use cardiopulmonary resuscitation (CPR) if breathing or heartbeat stops.  · The use or non-use of tube feeding.  · Withholding of food and fluids.  · Comfort (palliative) care when the goal becomes comfort rather than a cure.  · Organ and tissue donation.  A living will does not give instructions for distributing your money and property if you should pass away. It is recommended that you seek the advice of a  when writing a will. Decisions about taxes, beneficiaries, and asset distribution will be legally binding. This process can relieve your family and friends of any concerns surrounding disputes or questions that may come up about the distribution of your assets.  DNR or DNAR  A DNR or DNAR order is a request not to have CPR in the event that your heart stops beating or you stop breathing. If a DNR or DNAR order  has not been made and shared, a health care provider will try to help any patient whose heart has stopped or who has stopped breathing. If you plan to have surgery, talk with your health care provider about how your DNR or DNAR order will be followed if problems occur.  Summary  · Advance directives are the legal documents that allow you to make choices ahead of time about your health care and medical treatment in case you become unable to communicate for yourself.  · The process of discussing and writing advance directives should happen over time. You can change the advance directives, even after you have signed them.  · Advance directives include DNR or DNAR orders, living rich, and designating an agent as your medical power of .  This information is not intended to replace advice given to you by your health care provider. Make sure you discuss any questions you have with your health care provider.  Document Released: 03/26/2009 Document Revised: 11/06/2017 Document Reviewed: 11/06/2017  [employer] Interactive Patient Education © 2020 [employer]      Exercising to Stay Healthy  To become healthy and stay healthy, it is recommended that you do moderate-intensity and vigorous-intensity exercise. You can tell that you are exercising at a moderate intensity if your heart starts beating faster and you start breathing faster but can still hold a conversation. You can tell that you are exercising at a vigorous intensity if you are breathing much harder and faster and cannot hold a conversation while exercising.  Exercising regularly is important. It has many health benefits, such as:  · Improving overall fitness, flexibility, and endurance.  · Increasing bone density.  · Helping with weight control.  · Decreasing body fat.  · Increasing muscle strength.  · Reducing stress and tension.  · Improving overall health.  How often should I exercise?  Choose an activity that you enjoy, and set realistic goals. Your  health care provider can help you make an activity plan that works for you.  Exercise regularly as told by your health care provider. This may include:  · Doing strength training two times a week, such as:  ? Lifting weights.  ? Using resistance bands.  ? Push-ups.  ? Sit-ups.  ? Yoga.  · Doing a certain intensity of exercise for a given amount of time. Choose from these options:  ? A total of 150 minutes of moderate-intensity exercise every week.  ? A total of 75 minutes of vigorous-intensity exercise every week.  ? A mix of moderate-intensity and vigorous-intensity exercise every week.  Children, pregnant women, people who have not exercised regularly, people who are overweight, and older adults may need to talk with a health care provider about what activities are safe to do. If you have a medical condition, be sure to talk with your health care provider before you start a new exercise program.  What are some exercise ideas?  Moderate-intensity exercise ideas include:  · Walking 1 mile (1.6 km) in about 15 minutes.  · Biking.  · Hiking.  · Golfing.  · Dancing.  · Water aerobics.  Vigorous-intensity exercise ideas include:  · Walking 4.5 miles (7.2 km) or more in about 1 hour.  · Jogging or running 5 miles (8 km) in about 1 hour.  · Biking 10 miles (16.1 km) or more in about 1 hour.  · Lap swimming.  · Roller-skating or in-line skating.  · Cross-country skiing.  · Vigorous competitive sports, such as football, basketball, and soccer.  · Jumping rope.  · Aerobic dancing.  What are some everyday activities that can help me to get exercise?  · Yard work, such as:  ? Pushing a .  ? Raking and bagging leaves.  · Washing your car.  · Pushing a stroller.  · Shoveling snow.  · Gardening.  · Washing windows or floors.  How can I be more active in my day-to-day activities?  · Use stairs instead of an elevator.  · Take a walk during your lunch break.  · If you drive, park your car farther away from your work or  school.  · If you take public transportation, get off one stop early and walk the rest of the way.  · Stand up or walk around during all of your indoor phone calls.  · Get up, stretch, and walk around every 30 minutes throughout the day.  · Enjoy exercise with a friend. Support to continue exercising will help you keep a regular routine of activity.  What guidelines can I follow while exercising?  · Before you start a new exercise program, talk with your health care provider.  · Do not exercise so much that you hurt yourself, feel dizzy, or get very short of breath.  · Wear comfortable clothes and wear shoes with good support.  · Drink plenty of water while you exercise to prevent dehydration or heat stroke.  · Work out until your breathing and your heartbeat get faster.  Where to find more information  · [place] Department of Health and Human Services: www.hhs.gov  · Centers for Disease Control and Prevention (CDC): www.cdc.gov  Summary  · Exercising regularly is important. It will improve your overall fitness, flexibility, and endurance.  · Regular exercise also will improve your overall health. It can help you control your weight, reduce stress, and improve your bone density.  · Do not exercise so much that you hurt yourself, feel dizzy, or get very short of breath.  · Before you start a new exercise program, talk with your health care provider.  This information is not intended to replace advice given to you by your health care provider. Make sure you discuss any questions you have with your health care provider.  Document Released: 01/20/2012 Document Revised: 11/08/2018 Document Reviewed: 11/08/2018  [employer] Interactive Patient Education © 2020 [employer]

## 2020-03-05 NOTE — ASSESSMENT & PLAN NOTE
Hypertension is unchanged.  Patient to stop telmisartan with 1 month follow-up to recheck blood pressure.  Blood pressure will be reassessed at the next regular appointment.

## 2020-03-05 NOTE — PROGRESS NOTES
The ABCs of the Annual Wellness Visit  Subsequent Medicare Wellness Visit    Chief Complaint   Patient presents with   • Medicare Wellness-subsequent       Subjective   History of Present Illness:  Ismael Ball is a 79 y.o. male who presents for a Subsequent Medicare Wellness Visit.  He is also here for medication refill.  Blood pressures have been low despite taking half telmisartan every other day.  We will discontinue medicine with short-term follow-up.  Thyroid is controlled.  Labs for thyroid and lipid panel have been given to patient to obtain on Monday with his other labs from specialist.    HEALTH RISK ASSESSMENT    Recent Hospitalizations:  Recently treated at the following:  [facility] in September for bile duct obstruction.    Current Medical Providers:  Patient Care Team:  Pierre Olson MD as PCP - General (Family Medicine)  Master Corcoran MD as Consulting Physician (Medical Oncology)  Oscar Evangelista MD as Consulting Physician (Gastroenterology)    Smoking Status:  Social History     Tobacco Use   Smoking Status Former Smoker   • Types: Cigars   Smokeless Tobacco Never Used   Tobacco Comment    cigar quit 40 yrs ago       Alcohol Consumption:  Social History     Substance and Sexual Activity   Alcohol Use Yes    Comment: A GLASS OF WINE ONCE A MONTH        Depression Screen:   PHQ-2/PHQ-9 Depression Screening 3/5/2020   Little interest or pleasure in doing things 0   Feeling down, depressed, or hopeless 0   Total Score 0       Fall Risk Screen:  STEADI Fall Risk Assessment was completed, and patient is at MODERATE risk for falls. Assessment completed on:3/5/2020    Health Habits and Functional and Cognitive Screening:  Functional & Cognitive Status 3/5/2020   Do you have difficulty preparing food and eating? No   Do you have difficulty bathing yourself, getting dressed or grooming yourself? No   Do you have difficulty using the toilet? No   Do you have difficulty moving  around from place to place? No   Do you have trouble with steps or getting out of a bed or a chair? No   Current Diet Well Balanced Diet   Dental Exam Not up to date   Eye Exam Up to date   Exercise (times per week) 6 times per week   Current Exercise Activities Include Walking   Do you need help using the phone?  No   Are you deaf or do you have serious difficulty hearing?  No   Do you need help with transportation? No   Do you need help shopping? No   Do you need help preparing meals?  No   Do you need help with housework?  No   Do you need help with laundry? No   Do you need help taking your medications? No   Do you need help managing money? No   Do you ever drive or ride in a car without wearing a seat belt? No   Have you felt unusual stress, anger or loneliness in the last month? No   Who do you live with? Spouse   If you need help, do you have trouble finding someone available to you? No   Have you been bothered in the last four weeks by sexual problems? No   Do you have difficulty concentrating, remembering or making decisions? Yes         Does the patient have evidence of cognitive impairment? No    Asprin use counseling:Taking ASA appropriately as indicated    Age-appropriate Screening Schedule:  Refer to the list below for future screening recommendations based on patient's age, sex and/or medical conditions. Orders for these recommended tests are listed in the plan section. The patient has been provided with a written plan.    Health Maintenance   Topic Date Due   • TDAP/TD VACCINES (1 - Tdap) 04/29/1951   • ZOSTER VACCINE (1 of 2) 04/29/1990   • LIPID PANEL  02/22/2020   • INFLUENZA VACCINE  Completed          The following portions of the patient's history were reviewed and updated as appropriate: allergies, current medications, past family history, past medical history, past social history, past surgical history and problem list.    Outpatient Medications Prior to Visit   Medication Sig Dispense Refill    • bisacodyl (DULCOLAX) 5 MG EC tablet Take 5 mg by mouth Daily As Needed for Constipation.     • Calcium Carb-Cholecalciferol (CALCIUM-VITAMIN D3) 600-400 MG-UNIT tablet Take 2 tablets by mouth Daily.     • cyclophosphamide 50 MG capsule Take 250 mg by mouth 4 (Four) Times a Week.     • ondansetron ODT (ZOFRAN-ODT) 4 MG disintegrating tablet      • aspirin 81 MG EC tablet Take 81 mg by mouth Daily.     • cyclophosphamide 50 MG capsule Take 200 mg by mouth Daily.     • Enzalutamide (XTANDI PO) Take  by mouth 2 (Two) Times a Day.     • levothyroxine (SYNTHROID, LEVOTHROID) 200 MCG tablet Take 1 tablet by mouth Daily for 180 days. 90 tablet 1   • telmisartan (MICARDIS) 20 MG tablet Take 0.5 tablets by mouth Every Night for 180 days. 45 tablet 1   • aspirin 81 MG tablet Take 1 tablet by mouth Daily for 30 days. 30 tablet 0   • telmisartan (MICARDIS) 20 MG tablet TAKE 1 TABLET BY MOUTH EVERY NIGHT (Patient taking differently: Take 0.5 tablets every evening) 90 tablet 1     No facility-administered medications prior to visit.        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 Patient does not have an advance directive, information provided.    Review of Systems   Constitutional: Negative for fatigue.   Cardiovascular: Negative for chest pain.  "      Compared to one year ago, the patient feels his physical health is worse.  Due to recurrence of cancer diagnosis.  Compared to one year ago, the patient feels his mental health is the same.    Reviewed chart for potential of high risk medication in the elderly: yes  Reviewed chart for potential of harmful drug interactions in the elderly:yes    Objective         Vitals:    03/05/20 1348   BP: 104/62   Pulse: 68   Resp: 16   Temp: 97.9 °F (36.6 °C)   TempSrc: Oral   SpO2: 96%   Weight: 71.2 kg (157 lb)   Height: 177.8 cm (70\")   PainSc: 0-No pain       Body mass index is 22.53 kg/m².  Discussed the patient's BMI with him. The BMI is in the acceptable range.    Physical Exam   Constitutional: He is cooperative. No distress.   Eyes: Conjunctivae and lids are normal.   Neck: Carotid bruit is not present. No tracheal deviation present.   Cardiovascular: Normal rate, regular rhythm and normal heart sounds.   No murmur heard.  Pulmonary/Chest: Effort normal and breath sounds normal.   Neurological: He is alert. He is not disoriented.   Skin: Skin is warm and dry.   Psychiatric: He has a normal mood and affect. His speech is normal and behavior is normal.   Vitals reviewed.      Lab Results   Component Value Date     (H) 02/24/2020        Assessment/Plan   Medicare Risks and Personalized Health Plan  CMS Preventative Services Quick Reference  Advance Directive Discussion  Cardiovascular risk  Fall Risk  Immunizations Discussed/Encouraged (specific immunizations; Shingrix )    The above risks/problems have been discussed with the patient.  Pertinent information has been shared with the patient in the After Visit Summary.  Follow up plans and orders are seen below in the Assessment/Plan Section.    Diagnoses and all orders for this visit:    1. Medicare annual wellness visit, subsequent (Primary)  Comments:  Information on advanced directive, fall prevention, immunizations and exercise for health shared in after " visit summary.    2. Acquired hypothyroidism  Assessment & Plan:  Continue same therapy.    Orders:  -     levothyroxine (SYNTHROID, LEVOTHROID) 200 MCG tablet; Take 1 tablet by mouth Daily.  Dispense: 90 tablet; Refill: 3  -     TSH+Free T4; Future    3. Essential hypertension  Assessment & Plan:  Hypertension is unchanged.  Patient to stop telmisartan with 1 month follow-up to recheck blood pressure.  Blood pressure will be reassessed at the next regular appointment.      4. History of CVA (cerebrovascular accident)  Assessment & Plan:  The current medical regimen is effective;  continue present plan and medications.        5. Mixed hyperlipidemia  Assessment & Plan:  Lipid abnormalities are unchanged.  Nutritional counseling was provided.  Lipids will be reassessed in 1 year.    Orders:  -     Lipid Panel w/ Chol/HDL Ratio; Future    Follow Up:  Return in about 1 month (around 4/5/2020) for BP Check.     An After Visit Summary and PPPS were given to the patient.

## 2020-03-09 ENCOUNTER — RESULTS ENCOUNTER (OUTPATIENT)
Dept: FAMILY MEDICINE CLINIC | Facility: CLINIC | Age: 80
End: 2020-03-09

## 2020-03-09 DIAGNOSIS — E03.9 ACQUIRED HYPOTHYROIDISM: ICD-10-CM

## 2020-03-09 DIAGNOSIS — E78.2 MIXED HYPERLIPIDEMIA: ICD-10-CM

## 2020-03-10 DIAGNOSIS — R79.9 ABNORMAL BLOOD CHEMISTRY: Primary | ICD-10-CM

## 2020-03-10 DIAGNOSIS — E03.9 ACQUIRED HYPOTHYROIDISM: ICD-10-CM

## 2020-03-10 DIAGNOSIS — I10 ESSENTIAL HYPERTENSION: ICD-10-CM

## 2020-03-10 DIAGNOSIS — E78.2 MIXED HYPERLIPIDEMIA: ICD-10-CM

## 2020-07-14 ENCOUNTER — OFFICE VISIT (OUTPATIENT)
Dept: GASTROENTEROLOGY | Facility: CLINIC | Age: 80
End: 2020-07-14

## 2020-07-14 VITALS — BODY MASS INDEX: 21.19 KG/M2 | TEMPERATURE: 98.6 F | HEIGHT: 70 IN | WEIGHT: 148 LBS

## 2020-07-14 DIAGNOSIS — K83.1 BILE DUCT OBSTRUCTION: Primary | ICD-10-CM

## 2020-07-14 PROCEDURE — 99212 OFFICE O/P EST SF 10 MIN: CPT | Performed by: INTERNAL MEDICINE

## 2020-07-14 NOTE — PROGRESS NOTES
Chief Complaint   Patient presents with   • Follow-up       Ismael Ball is a  80 y.o. male here for a follow up visit for recurrent stone disease.    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  Patient denies any GI complaints tolerating diet well.  Patient with no change in urine or stool color.  Patient status post recent labs that he brought in that were normal.    Past Medical History:   Diagnosis Date   • Abdominal pain    • Acute cholangitis 4/25/2019   • Anemia    • Aneurysm of ascending aorta (CMS/HCC)    • Arthritis    • Ascending cholangitis    • Aspiration pneumonia of right lower lobe (CMS/HCC)    • Bacteremia due to Escherichia coli 4/25/2019   • Biliary stent obstruction 4/24/2019   • Biliary stricture    • Cancer (CMS/HCC)    • Chills    • Cholangitis    • Chronic adrenal insufficiency (CMS/HCC)    • CVA (cerebral vascular accident) (CMS/HCC)    • Diaphoresis    • Diarrhea    • Diverticulosis of colon    • Dizziness    • E coli infection 2017    blood   • Elevated brain natriuretic peptide (BNP) level    • Elevated cholesterol    • Elevated liver function tests    • Elevated liver function tests 4/24/2019   • Fatigue    • Fever    • Herpes labialis    • History of pneumonia     MARCH 2017   • History of transfusion    • Hyperlipidemia    • Hypertension    • Hypothyroidism    • Metastatic disease (CMS/HCC)    • PONV (postoperative nausea and vomiting)    • Prostate cancer (CMS/HCC)     with bony metastases   • Septic shock (CMS/HCC)    • Shortness of breath    • Vomiting    • Weakness          Current Outpatient Medications:   •  Aspirin Buf,CaCarb-MgCarb-MgO, 81 MG tablet, Take 81 mg by mouth Daily., Disp: , Rfl:   •  bisacodyl (DULCOLAX) 5 MG EC tablet, Take 5 mg by mouth Daily As Needed for Constipation., Disp: , Rfl:   •  Calcium Carb-Cholecalciferol (CALCIUM-VITAMIN D3) 600-400 MG-UNIT tablet, Take 2 tablets by  mouth Daily., Disp: , Rfl:   •  levothyroxine (SYNTHROID, LEVOTHROID) 200 MCG tablet, Take 1 tablet by mouth Daily., Disp: 90 tablet, Rfl: 3  •  aspirin 81 MG tablet, Take 1 tablet by mouth Daily for 30 days., Disp: 30 tablet, Rfl: 0  •  cyclophosphamide 50 MG capsule, Take 250 mg by mouth 4 (Four) Times a Week., Disp: , Rfl:   •  ondansetron ODT (ZOFRAN-ODT) 4 MG disintegrating tablet, , Disp: , Rfl:     Allergies   Allergen Reactions   • Hydromorphone GI Intolerance     VOMITING   • Penicillins Other (See Comments)     Unknown, many years ago per pt when he was a child; Tolerated cephalosporins (cefepime) during 3/2017 admission.    • Statins Myalgia       Social History     Socioeconomic History   • Marital status:      Spouse name: Not on file   • Number of children: Not on file   • Years of education: Not on file   • Highest education level: Not on file   Tobacco Use   • Smoking status: Former Smoker     Types: Cigars   • Smokeless tobacco: Never Used   • Tobacco comment: cigar quit 40 yrs ago   Substance and Sexual Activity   • Alcohol use: Yes     Comment: A GLASS OF WINE ONCE A MONTH    • Drug use: Never   • Sexual activity: Defer       Family History   Problem Relation Age of Onset   • Arthritis Mother    • Hypertension Mother    • Arthritis Father    • Cancer Father    • Heart disease Father    • Heart attack Father    • Heart disease Sister        Review of Systems   Constitutional: Negative.    Respiratory: Negative.    Cardiovascular: Negative.    Gastrointestinal: Negative.    Skin: Negative.    Hematological: Negative.        Vitals:    07/14/20 1255   Temp: 98.6 °F (37 °C)       Physical Exam   Constitutional: He is oriented to person, place, and time. He appears well-developed and well-nourished.   HENT:   Head: Normocephalic and atraumatic.   Eyes: Pupils are equal, round, and reactive to light. No scleral icterus.   Cardiovascular: Normal rate, regular rhythm and normal heart sounds.    Pulmonary/Chest: Effort normal and breath sounds normal. No respiratory distress.   Abdominal: Soft. Bowel sounds are normal. He exhibits no distension and no mass. There is no tenderness. No hernia.   Neurological: He is alert and oriented to person, place, and time.   Skin: Skin is warm and dry. No rash noted.   Psychiatric: He has a normal mood and affect. His behavior is normal.   Vitals reviewed.      No visits with results within 2 Month(s) from this visit.   Latest known visit with results is:   Admission on 05/29/2019, Discharged on 05/29/2019   Component Date Value Ref Range Status   • Fungus Culture 05/29/2019 No fungus isolated at 4 weeks   Final   • AFB Culture 05/29/2019 No AFB isolated at 6 weeks   Final   • AFB Stain 05/29/2019 No acid fast bacilli seen on concentrated smear   Final   • Respiratory Culture 05/29/2019 No growth   Final   • Gram Stain 05/29/2019 No WBCs or organisms seen   Final   • Case Report 05/29/2019    Final                    Value:Non-gynecologic Cytology                          Case: [id number]                                  Authorizing Provider:  Anjum Reza MD    Collected:           05/29/2019 08:39 AM          Ordering Location:     [facility]  Received:            05/29/2019 12:17 PM                                 [facility]                                                                  Pathologist:           Fercho Guerrero MD                                                     Specimens:   1) - Lung, Right Lower Lobe, Brushing                                                               2) - Lung, Right Lower Lobe, BAL                                                          • Final Diagnosis 05/29/2019    Final                    Value:This result contains rich text formatting which cannot be displayed here.   • Gross Description 05/29/2019    Final                    Value:This result contains rich text formatting which cannot be  displayed here.   • Microscopic Description 05/29/2019    Final                    Value:This result contains rich text formatting which cannot be displayed here.   • Fungus Culture 05/29/2019 No fungus isolated at 4 weeks   Final   • Fungal Stain 05/29/2019 No fungal elements seen   Final   • BAL Culture 05/29/2019 No growth   Final   • Gram Stain 05/29/2019 Rare (1+) WBCs seen   Final   • Gram Stain 05/29/2019 No organisms seen   Final   • Color, Fluid 05/29/2019 Colorless   Final   • Appearance, Fluid 05/29/2019 Clear  Clear Final   • WBC, Fluid 05/29/2019 11  /mm3 Final   • RBC, Fluid 05/29/2019 192  /mm3 Final   • Neutrophils, Fluid 05/29/2019 38  % Final   • Lymphocytes, Fluid 05/29/2019 6  % Final   • Monocytes, Fluid 05/29/2019 2  % Final   • Eosinophils, Fluid 05/29/2019 1  % Final   • Mononuclear, Fluid 05/29/2019 53  % Final   • Other Cells/100 WBCs, Fluid 05/29/2019 37  /100 WBCs Final       Ismael was seen today for follow-up.    Diagnoses and all orders for this visit:    Bile duct obstruction      Patient 80-year-old male with history of ampullary stenosis with recurrent stone disease reports now doing exceptionally well.  Patient with no complaints normal-appearing stool and urine.  Patient with labs 4 weeks ago that were completely normal and comes to LFTs.  Patient tolerating diet with no pain we will continue to follow every 6 months but would recommend again repeating labs every 6 months and if LFTs begin to rise to refer back urgently.

## 2020-07-31 ENCOUNTER — OFFICE VISIT (OUTPATIENT)
Dept: FAMILY MEDICINE CLINIC | Facility: CLINIC | Age: 80
End: 2020-07-31

## 2020-07-31 VITALS
SYSTOLIC BLOOD PRESSURE: 100 MMHG | WEIGHT: 148 LBS | DIASTOLIC BLOOD PRESSURE: 60 MMHG | BODY MASS INDEX: 21.19 KG/M2 | RESPIRATION RATE: 16 BRPM | TEMPERATURE: 97.3 F | HEIGHT: 70 IN | HEART RATE: 67 BPM | OXYGEN SATURATION: 98 %

## 2020-07-31 DIAGNOSIS — M26.69 TMJ INFLAMMATION: Primary | ICD-10-CM

## 2020-07-31 PROCEDURE — 99213 OFFICE O/P EST LOW 20 MIN: CPT | Performed by: NURSE PRACTITIONER

## 2020-07-31 RX ORDER — PREDNISONE 20 MG/1
20 TABLET ORAL 2 TIMES DAILY
Qty: 14 TABLET | Refills: 0 | Status: SHIPPED | OUTPATIENT
Start: 2020-07-31

## 2020-07-31 NOTE — PROGRESS NOTES
Subjective   Ismael Ball is a 80 y.o. male.     History of Present Illness   Patient presents to office for right ear/and preauricular pain.  Reports symptoms a few days ago.  States he does not know of any injury to his eardrum but was cleaning his ears 2 days prior.  Denies drainage. Denies fullness or decreased hearing.  Denies hx of TMJ or grinding his teeth. Report tenderness in preauricular area.   The following portions of the patient's history were reviewed and updated as appropriate: allergies, current medications, past family history, past medical history, past social history, past surgical history and problem list.    Review of Systems   Constitutional: Negative for chills, fatigue and fever.   HENT: Positive for ear pain. Negative for ear discharge, sinus pressure and sinus pain.    Respiratory: Negative for cough and shortness of breath.    Cardiovascular: Negative for chest pain and palpitations.   Skin: Negative for rash.   Psychiatric/Behavioral: Negative for dysphoric mood and sleep disturbance. The patient is not nervous/anxious.        Objective   Physical Exam   Constitutional: He is oriented to person, place, and time. He appears well-developed and well-nourished.   HENT:   Head:       Right Ear: Tympanic membrane, external ear and ear canal normal.   Left Ear: Tympanic membrane, external ear and ear canal normal.   Tenderness and swelling to right preauricular area.  No lymphadenopathy appreciated.    Pulmonary/Chest: Effort normal.   Neurological: He is alert and oriented to person, place, and time.   Psychiatric: He has a normal mood and affect. His behavior is normal. Judgment and thought content normal.   Nursing note and vitals reviewed.      Assessment/Plan   Ismael was seen today for jaw pain and earache.    Diagnoses and all orders for this visit:    TMJ inflammation  -     predniSONE (DELTASONE) 20 MG tablet; Take 1 tablet by mouth 2 (Two) Times a Day.

## 2020-10-18 ENCOUNTER — HOSPITAL ENCOUNTER (EMERGENCY)
Facility: HOSPITAL | Age: 80
Discharge: HOME | End: 2020-10-18
Attending: EMERGENCY MEDICINE | Admitting: EMERGENCY MEDICINE

## 2020-10-18 ENCOUNTER — APPOINTMENT (OUTPATIENT)
Dept: GENERAL RADIOLOGY | Facility: HOSPITAL | Age: 80
End: 2020-10-18

## 2020-10-18 VITALS
DIASTOLIC BLOOD PRESSURE: 63 MMHG | OXYGEN SATURATION: 98 % | HEIGHT: 70 IN | WEIGHT: 148 LBS | SYSTOLIC BLOOD PRESSURE: 120 MMHG | HEART RATE: 74 BPM | BODY MASS INDEX: 21.19 KG/M2 | RESPIRATION RATE: 18 BRPM | TEMPERATURE: 98.2 F

## 2020-10-18 DIAGNOSIS — B34.9 VIRAL SYNDROME: Primary | ICD-10-CM

## 2020-10-18 DIAGNOSIS — M79.10 MYALGIA: ICD-10-CM

## 2020-10-18 DIAGNOSIS — Z20.822 SUSPECTED COVID-19 VIRUS INFECTION: ICD-10-CM

## 2020-10-18 LAB
ALBUMIN SERPL-MCNC: 3.4 G/DL (ref 3.5–5.2)
ALBUMIN/GLOB SERPL: 1.1 G/DL
ALP SERPL-CCNC: 106 U/L (ref 39–117)
ALT SERPL W P-5'-P-CCNC: 12 U/L (ref 1–41)
ANION GAP SERPL CALCULATED.3IONS-SCNC: 7.8 MMOL/L (ref 5–15)
AST SERPL-CCNC: 12 U/L (ref 1–40)
BASOPHILS # BLD AUTO: 0.02 10*3/MM3 (ref 0–0.2)
BASOPHILS NFR BLD AUTO: 0.4 % (ref 0–1.5)
BILIRUB SERPL-MCNC: 0.8 MG/DL (ref 0–1.2)
BUN SERPL-MCNC: 20 MG/DL (ref 8–23)
BUN/CREAT SERPL: 30.3 (ref 7–25)
CALCIUM SPEC-SCNC: 9.3 MG/DL (ref 8.6–10.5)
CHLORIDE SERPL-SCNC: 104 MMOL/L (ref 98–107)
CO2 SERPL-SCNC: 28.2 MMOL/L (ref 22–29)
CREAT SERPL-MCNC: 0.66 MG/DL (ref 0.76–1.27)
DEPRECATED RDW RBC AUTO: 47.9 FL (ref 37–54)
EOSINOPHIL # BLD AUTO: 0.16 10*3/MM3 (ref 0–0.4)
EOSINOPHIL NFR BLD AUTO: 3.2 % (ref 0.3–6.2)
ERYTHROCYTE [DISTWIDTH] IN BLOOD BY AUTOMATED COUNT: 14.5 % (ref 12.3–15.4)
GFR SERPL CREATININE-BSD FRML MDRD: 116 ML/MIN/1.73
GLOBULIN UR ELPH-MCNC: 3 GM/DL
GLUCOSE SERPL-MCNC: 121 MG/DL (ref 65–99)
HCT VFR BLD AUTO: 33.4 % (ref 37.5–51)
HGB BLD-MCNC: 11.1 G/DL (ref 13–17.7)
IMM GRANULOCYTES # BLD AUTO: 0.02 10*3/MM3 (ref 0–0.05)
IMM GRANULOCYTES NFR BLD AUTO: 0.4 % (ref 0–0.5)
LYMPHOCYTES # BLD AUTO: 1.57 10*3/MM3 (ref 0.7–3.1)
LYMPHOCYTES NFR BLD AUTO: 31.3 % (ref 19.6–45.3)
MCH RBC QN AUTO: 30.2 PG (ref 26.6–33)
MCHC RBC AUTO-ENTMCNC: 33.2 G/DL (ref 31.5–35.7)
MCV RBC AUTO: 90.8 FL (ref 79–97)
MONOCYTES # BLD AUTO: 0.47 10*3/MM3 (ref 0.1–0.9)
MONOCYTES NFR BLD AUTO: 9.4 % (ref 5–12)
NEUTROPHILS NFR BLD AUTO: 2.78 10*3/MM3 (ref 1.7–7)
NEUTROPHILS NFR BLD AUTO: 55.3 % (ref 42.7–76)
NRBC BLD AUTO-RTO: 0.2 /100 WBC (ref 0–0.2)
NT-PROBNP SERPL-MCNC: 177.9 PG/ML (ref 0–1800)
PLATELET # BLD AUTO: 164 10*3/MM3 (ref 140–450)
PMV BLD AUTO: 8.7 FL (ref 6–12)
POTASSIUM SERPL-SCNC: 3.9 MMOL/L (ref 3.5–5.2)
PROT SERPL-MCNC: 6.4 G/DL (ref 6–8.5)
RBC # BLD AUTO: 3.68 10*6/MM3 (ref 4.14–5.8)
SODIUM SERPL-SCNC: 140 MMOL/L (ref 136–145)
TROPONIN T SERPL-MCNC: <0.01 NG/ML (ref 0–0.03)
WBC # BLD AUTO: 5.02 10*3/MM3 (ref 3.4–10.8)

## 2020-10-18 PROCEDURE — U0004 COV-19 TEST NON-CDC HGH THRU: HCPCS | Performed by: EMERGENCY MEDICINE

## 2020-10-18 PROCEDURE — 85025 COMPLETE CBC W/AUTO DIFF WBC: CPT | Performed by: EMERGENCY MEDICINE

## 2020-10-18 PROCEDURE — 83880 ASSAY OF NATRIURETIC PEPTIDE: CPT | Performed by: EMERGENCY MEDICINE

## 2020-10-18 PROCEDURE — 99284 EMERGENCY DEPT VISIT MOD MDM: CPT

## 2020-10-18 PROCEDURE — 71045 X-RAY EXAM CHEST 1 VIEW: CPT

## 2020-10-18 PROCEDURE — 80053 COMPREHEN METABOLIC PANEL: CPT | Performed by: EMERGENCY MEDICINE

## 2020-10-18 PROCEDURE — 93010 ELECTROCARDIOGRAM REPORT: CPT | Performed by: INTERNAL MEDICINE

## 2020-10-18 PROCEDURE — 93005 ELECTROCARDIOGRAM TRACING: CPT | Performed by: EMERGENCY MEDICINE

## 2020-10-18 PROCEDURE — 84484 ASSAY OF TROPONIN QUANT: CPT | Performed by: EMERGENCY MEDICINE

## 2020-10-18 RX ORDER — SODIUM CHLORIDE 0.9 % (FLUSH) 0.9 %
10 SYRINGE (ML) INJECTION AS NEEDED
Status: DISCONTINUED | OUTPATIENT
Start: 2020-10-18 | End: 2020-10-18 | Stop reason: HOSPADM

## 2020-10-18 NOTE — ED PROVIDER NOTES
EMERGENCY DEPARTMENT ENCOUNTER    Room Number:  35/35  Date of encounter:  10/18/2020  PCP: Pierre Olson MD  Historian: Patient      HPI:  Chief Complaint: Myalgias  A complete HPI/ROS/PMH/PSH/SH/FH are unobtainable due to: None    Context: Ismael Ball is a 80 y.o. male who presents to the ED c/o myalgias.  States he has had it for 2 weeks.  He has had increasingly worsening dyspnea which she has chronically.  He also feels that he has chest congestion for the past 2 days.  He is concerned that he is COVID-19.  No fever.  No vomiting.  No sore throat.  No diarrhea, constipation, abdominal pain, urinary symptoms.  He has a history of prostate cancer, hypertension, dyslipidemia.      PAST MEDICAL HISTORY  Active Ambulatory Problems     Diagnosis Date Noted   • Acquired hypothyroidism 02/18/2016   • Essential hypertension 02/18/2016   • Mixed hyperlipidemia 02/18/2016   • History of CVA (cerebrovascular accident) 02/18/2016   • History of prostate cancer 02/18/2016   • Shortness of breath 02/22/2017   • Temporary cerebral vascular dysfunction 07/10/2013   • Retroperitoneal lymphadenopathy 05/15/2013   • Malignant neoplasm of prostate (CMS/HCC) 04/18/2017   • Malignant neoplasm metastatic to bone (CMS/HCC) 05/15/2013   • Elevated prostate specific antigen (PSA) 05/15/2013   • Elevated brain natriuretic peptide (BNP) level 04/20/2017   • Elevated liver function tests 04/24/2019   • Hyperbilirubinemia 05/04/2019   • Bile duct obstruction 09/20/2019   • Family history of malignant neoplasm of prostate 05/15/2013     Resolved Ambulatory Problems     Diagnosis Date Noted   • Biliary stricture 02/11/2017   • Ascending cholangitis 03/22/2017   • E. coli sepsis (CMS/HCC) 03/22/2017   • Biliary stent obstruction 04/24/2019   • Bacteremia due to Escherichia coli 04/25/2019   • Acute cholangitis 04/25/2019     Past Medical History:   Diagnosis Date   • Abdominal pain    • Anemia    • Aneurysm of ascending aorta (CMS/HCC)     • Arthritis    • Aspiration pneumonia of right lower lobe (CMS/HCC)    • Cancer (CMS/HCC)    • Chills    • Cholangitis    • Chronic adrenal insufficiency (CMS/HCC)    • CVA (cerebral vascular accident) (CMS/HCC)    • Diaphoresis    • Diarrhea    • Diverticulosis of colon    • Dizziness    • E coli infection 2017   • Elevated cholesterol    • Fatigue    • Fever    • Herpes labialis    • History of pneumonia    • History of transfusion    • Hyperlipidemia    • Hypertension    • Hypothyroidism    • Metastatic disease (CMS/HCC)    • PONV (postoperative nausea and vomiting)    • Prostate cancer (CMS/HCC)    • Septic shock (CMS/HCC)    • Vomiting    • Weakness          PAST SURGICAL HISTORY  Past Surgical History:   Procedure Laterality Date   • BILE DUCT STENT PLACEMENT     • BRONCHOSCOPY N/A 5/29/2019    Procedure: BRONCHOSCOPY;  Surgeon: Anjum Reza MD;  Location: [facility] ENDOSCOPY;  Service: Pulmonary   • CARDIAC CATHETERIZATION     • CATARACT EXTRACTION, BILATERAL Bilateral    • COLONOSCOPY  unknown    normal   • ERCP N/A 5/6/2019    Procedure: ENDOSCOPIC RETROGRADE CHOLANGIOPANCREATOGRAPHY WITH BALLOON SWEEP;  Surgeon: Oscar Evangelista MD;  Location: [facility] ENDOSCOPY;  Service: Gastroenterology   • ERCP N/A 9/30/2019    Procedure: ENDOSCOPIC RETROGRADE CHOLANGIOPANCREATOGRAPHY WITH  BILIARY STENT REPLACEMENT (10MM X60MM);  Surgeon: Oscar Evangelista MD;  Location: [facility] ENDOSCOPY;  Service: Gastroenterology   • EYE SURGERY  12/05/2015    cataract   • HERNIA REPAIR     • MD ERCP DX COLLECTION SPECIMEN BRUSHING/WASHING N/A 3/24/2017    Procedure: ENDOSCOPIC RETROGRADE CHOLANGIOPANCREATOGRAPHY, STENT REMOVAL, STENT PLACEMENT, CHOLANGIOGRAM;  Surgeon: Oscar Evangelista MD;  Location: [facility];  Service: Gastroenterology   • MD ERCP DX COLLECTION SPECIMEN BRUSHING/WASHING N/A 5/1/2017    Procedure: ENDOSCOPIC RETROGRADE CHOLANGIOPANCREATOGRAPHY WITH STENT REMOVAL, SPYGLASS BIOPSIES AND WALLFLEX  BILIARY STENT PLACEMENT;  Surgeon: Oscar Evangelista MD;  Location: [facility] ENDOSCOPY;  Service: Gastroenterology   • PROSTATECTOMY     • TOTAL HIP ARTHROPLASTY Right          FAMILY HISTORY  Family History   Problem Relation Age of Onset   • Arthritis Mother    • Hypertension Mother    • Arthritis Father    • Cancer Father    • Heart disease Father    • Heart attack Father    • Heart disease Sister          SOCIAL HISTORY  Social History     Socioeconomic History   • Marital status:      Spouse name: Not on file   • Number of children: Not on file   • Years of education: Not on file   • Highest education level: Not on file   Tobacco Use   • Smoking status: Former Smoker     Types: Cigars   • Smokeless tobacco: Never Used   • Tobacco comment: cigar quit 40 yrs ago   Substance and Sexual Activity   • Alcohol use: Yes     Comment: A GLASS OF WINE ONCE A MONTH    • Drug use: Never   • Sexual activity: Defer         ALLERGIES  Hydromorphone, Penicillins, and Statins        REVIEW OF SYSTEMS  Review of Systems     All systems reviewed and negative except for those discussed in HPI.       PHYSICAL EXAM    I have reviewed the triage vital signs and nursing notes.    ED Triage Vitals [10/18/20 0848]   Temp Heart Rate Resp BP SpO2   98.2 °F (36.8 °C) 89 18 -- 96 %      Temp src Heart Rate Source Patient Position BP Location FiO2 (%)   Tympanic -- -- -- --       Physical Exam  GENERAL: not distressed  HENT: nares patent  EYES: no scleral icterus  CV: regular rhythm, regular rate  RESPIRATORY: normal effort, clear to auscultation bilaterally  ABDOMEN: soft, nontender  MUSCULOSKELETAL: no deformity  NEURO: alert, moves all extremities, follows commands  SKIN: warm, dry        LAB RESULTS  Recent Results (from the past 24 hour(s))   Comprehensive Metabolic Panel    Collection Time: 10/18/20  9:33 AM    Specimen: Arm, Right; Blood   Result Value Ref Range    Glucose 121 (H) 65 - 99 mg/dL    BUN 20 8 - 23 mg/dL     Creatinine 0.66 (L) 0.76 - 1.27 mg/dL    Sodium 140 136 - 145 mmol/L    Potassium 3.9 3.5 - 5.2 mmol/L    Chloride 104 98 - 107 mmol/L    CO2 28.2 22.0 - 29.0 mmol/L    Calcium 9.3 8.6 - 10.5 mg/dL    Total Protein 6.4 6.0 - 8.5 g/dL    Albumin 3.40 (L) 3.50 - 5.20 g/dL    ALT (SGPT) 12 1 - 41 U/L    AST (SGOT) 12 1 - 40 U/L    Alkaline Phosphatase 106 39 - 117 U/L    Total Bilirubin 0.8 0.0 - 1.2 mg/dL    eGFR Non African Amer 116 >60 mL/min/1.73    Globulin 3.0 gm/dL    A/G Ratio 1.1 g/dL    BUN/Creatinine Ratio 30.3 (H) 7.0 - 25.0    Anion Gap 7.8 5.0 - 15.0 mmol/L   BNP    Collection Time: 10/18/20  9:33 AM    Specimen: Arm, Right; Blood   Result Value Ref Range    proBNP 177.9 0.0-1,800.0 pg/mL   Troponin    Collection Time: 10/18/20  9:33 AM    Specimen: Arm, Right; Blood   Result Value Ref Range    Troponin T <0.010 0.000 - 0.030 ng/mL   CBC Auto Differential    Collection Time: 10/18/20  9:33 AM    Specimen: Arm, Right; Blood   Result Value Ref Range    WBC 5.02 3.40 - 10.80 10*3/mm3    RBC 3.68 (L) 4.14 - 5.80 10*6/mm3    Hemoglobin 11.1 (L) 13.0 - 17.7 g/dL    Hematocrit 33.4 (L) 37.5 - 51.0 %    MCV 90.8 79.0 - 97.0 fL    MCH 30.2 26.6 - 33.0 pg    MCHC 33.2 31.5 - 35.7 g/dL    RDW 14.5 12.3 - 15.4 %    RDW-SD 47.9 37.0 - 54.0 fl    MPV 8.7 6.0 - 12.0 fL    Platelets 164 140 - 450 10*3/mm3    Neutrophil % 55.3 42.7 - 76.0 %    Lymphocyte % 31.3 19.6 - 45.3 %    Monocyte % 9.4 5.0 - 12.0 %    Eosinophil % 3.2 0.3 - 6.2 %    Basophil % 0.4 0.0 - 1.5 %    Immature Grans % 0.4 0.0 - 0.5 %    Neutrophils, Absolute 2.78 1.70 - 7.00 10*3/mm3    Lymphocytes, Absolute 1.57 0.70 - 3.10 10*3/mm3    Monocytes, Absolute 0.47 0.10 - 0.90 10*3/mm3    Eosinophils, Absolute 0.16 0.00 - 0.40 10*3/mm3    Basophils, Absolute 0.02 0.00 - 0.20 10*3/mm3    Immature Grans, Absolute 0.02 0.00 - 0.05 10*3/mm3    nRBC 0.2 0.0 - 0.2 /100 WBC       Ordered the above labs and independently reviewed the results.        RADIOLOGY  Xr  Chest 1 View    Result Date: 10/18/2020  ONE VIEW PORTABLE CHEST  HISTORY: Cough and congestion. Possible Covid 19 pneumonia.  There is slight elevation of the right hemidiaphragm unchanged from 05/04/2019. The lungs are grossly clear and unchanged. The heart remains mildly enlarged.  This report was finalized on 10/18/2020 10:38 AM by Dr. Rudy Fairbanks M.D.        I ordered the above noted radiological studies. Reviewed by me and discussed with radiologist.  See dictation for official radiology interpretation.      PROCEDURES    Procedures      MEDICATIONS GIVEN IN ER    Medications   sodium chloride 0.9 % flush 10 mL (has no administration in time range)         PROGRESS, DATA ANALYSIS, CONSULTS, AND MEDICAL DECISION MAKING    All labs have been independently reviewed by me.  All radiology studies have been reviewed by me and discussed with radiologist dictating the report.   EKG's independently viewed and interpreted by me.  Discussion below represents my analysis of pertinent findings related to patient's condition, differential diagnosis, treatment plan and final disposition.    Differential diagnosis includes pneumonia, COVID-19, intra-abdominal infection, UTI.  He has no urinary symptoms.  He is clinically well-appearing.  He is satting 96% on room air.  He is here because he is concerned about COVID-19.  We will swab him for such as well as get a chest x-ray to evaluate for pneumonia.    ED Course as of Oct 18 1111   Sun Oct 18, 2020   0938 EKG interpreted by myself.  Time 9:34 AM.  Sinus rhythm.  Heart rate 74.  Normal intervals and axis.  Low voltage in the precordial and limb leads.  No acute ST normality.  Delayed R wave progression.    [TD]   0938 On medical chart review, old EKG obtained from 5/4/2019.  Sinus rhythm.  Heart rate 70.  Left atrial normality.  No significant change.    [TD]   1050 Troponin T: <0.010 [TD]   1051 WBC: 5.02 [TD]   1051 Creatinine(!): 0.66 [TD]   1051 Chest x-ray interpreted  by myself.  There is no evidence of pneumonia.  Right hemidiaphragm elevation.    [TD]      ED Course User Index  [TD] Avinash Nobles II, MD       Updated the patient on results.  We will follow-up his COVID-19 results.    PPE: Both the patient and I wore a surgical mask throughout the entire patient encounter. I wore protective goggles.     AS OF 11:11 EDT VITALS:    BP - 120/63  HR - 74  TEMP - 98.2 °F (36.8 °C) (Tympanic)  O2 SATS - 98%        DIAGNOSIS  Final diagnoses:   Viral syndrome   Myalgia   Suspected COVID-19 virus infection         DISPOSITION  DISCHARGE    FOLLOW-UP  [facility] Emergency Department  [address] [facility] [address] [phone]  Go to   Immediately if your symptoms worsen particularly if your oxygen level reaches 92% or less         Medication List      No changes were made to your prescriptions during this visit.                  Avinash Nobles II, MD  10/18/20 1111

## 2020-10-18 NOTE — ED NOTES
Patient was placed in face mask in first look.  Patient was wearing a face mask throughout our encounter.  I wore gown, mask, face shield throughout the encounter.  Hand hygiene was performed before and after patient encounter.  Isolation maintained.     Varinder Gomez RN  10/18/20 [id number]

## 2020-10-18 NOTE — ED TRIAGE NOTES
Pt to ER via PV. Pt states body aches, SOA, and chest congestion x1 week ago.     Patient in mask. This RN in appropriate PPE - including mask, goggles, and gloves during all of patient care.

## 2020-10-19 LAB — SARS-COV-2 RNA RESP QL NAA+PROBE: NOT DETECTED

## 2020-11-10 ENCOUNTER — OFFICE VISIT (OUTPATIENT)
Dept: GASTROENTEROLOGY | Facility: CLINIC | Age: 80
End: 2020-11-10

## 2020-11-10 VITALS — BODY MASS INDEX: 20.92 KG/M2 | TEMPERATURE: 98.3 F | WEIGHT: 145.8 LBS

## 2020-11-10 DIAGNOSIS — K83.1 BILE DUCT OBSTRUCTION: Primary | ICD-10-CM

## 2020-11-10 DIAGNOSIS — K59.00 CONSTIPATION, UNSPECIFIED CONSTIPATION TYPE: ICD-10-CM

## 2020-11-10 PROCEDURE — 99214 OFFICE O/P EST MOD 30 MIN: CPT | Performed by: INTERNAL MEDICINE

## 2020-11-10 NOTE — PROGRESS NOTES
Chief Complaint   Patient presents with   • Change in bowels   • Anorexia       Ismael Ball is a  80 y.o. male here for a follow up visit for constipation with decreased appetite.    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  Patient was well until 3 weeks ago when he presented to the ER with aches and pains low-grade fevers.  Labs were normal and patient discharged home.  Patient reports progressive constipation is led to decreased appetite with ongoing body aches here for further recommendations.  Patient reports no change in urine or stool color.  Concern for possible stent obstruction.    Past Medical History:   Diagnosis Date   • Abdominal pain    • Acute cholangitis 4/25/2019   • Anemia    • Aneurysm of ascending aorta (CMS/HCC)    • Arthritis    • Ascending cholangitis    • Aspiration pneumonia of right lower lobe (CMS/HCC)    • Bacteremia due to Escherichia coli 4/25/2019   • Biliary stent obstruction 4/24/2019   • Biliary stricture    • Cancer (CMS/HCC)    • Chills    • Cholangitis    • Chronic adrenal insufficiency (CMS/HCC)    • CVA (cerebral vascular accident) (CMS/HCC)    • Diaphoresis    • Diarrhea    • Diverticulosis of colon    • Dizziness    • E coli infection 2017    blood   • Elevated brain natriuretic peptide (BNP) level    • Elevated cholesterol    • Elevated liver function tests    • Elevated liver function tests 4/24/2019   • Fatigue    • Fever    • Herpes labialis    • History of pneumonia     MARCH 2017   • History of transfusion    • Hyperlipidemia    • Hypertension    • Hypothyroidism    • Metastatic disease (CMS/HCC)    • PONV (postoperative nausea and vomiting)    • Prostate cancer (CMS/HCC)     with bony metastases   • Septic shock (CMS/HCC)    • Shortness of breath    • Vomiting    • Weakness          Current Outpatient Medications:   •  Aspirin Buf,CaCarb-MgCarb-MgO, 81 MG tablet, Take 81 mg by mouth Every  Other Day., Disp: , Rfl:   •  bisacodyl (DULCOLAX) 5 MG EC tablet, Take 5 mg by mouth Daily As Needed for Constipation., Disp: , Rfl:   •  Calcium Carb-Cholecalciferol (CALCIUM-VITAMIN D3) 600-400 MG-UNIT tablet, Take 2 tablets by mouth Daily., Disp: , Rfl:   •  levothyroxine (SYNTHROID, LEVOTHROID) 200 MCG tablet, Take 1 tablet by mouth Daily., Disp: 90 tablet, Rfl: 3  •  Loratadine-Pseudoephedrine (CLARITIN-D 24 HOUR PO), Take  by mouth As Needed., Disp: , Rfl:   •  ondansetron ODT (ZOFRAN-ODT) 4 MG disintegrating tablet, , Disp: , Rfl:   •  aspirin 81 MG tablet, Take 1 tablet by mouth Daily for 30 days., Disp: 30 tablet, Rfl: 0  •  cyclophosphamide 50 MG capsule, Take 250 mg by mouth 4 (Four) Times a Week., Disp: , Rfl:   •  linaclotide (Linzess) 145 MCG capsule capsule, Take 1 capsule by mouth Every Morning Before Breakfast., Disp: 20 capsule, Rfl: 0  •  predniSONE (DELTASONE) 20 MG tablet, Take 1 tablet by mouth 2 (Two) Times a Day., Disp: 14 tablet, Rfl: 0    Allergies   Allergen Reactions   • Hydromorphone GI Intolerance     VOMITING   • Penicillins Other (See Comments)     Unknown, many years ago per pt when he was a child; Tolerated cephalosporins (cefepime) during 3/2017 admission.    • Statins Myalgia       Social History     Socioeconomic History   • Marital status:      Spouse name: Not on file   • Number of children: Not on file   • Years of education: Not on file   • Highest education level: Not on file   Tobacco Use   • Smoking status: Former Smoker     Types: Cigars   • Smokeless tobacco: Never Used   • Tobacco comment: cigar quit 40 yrs ago   Substance and Sexual Activity   • Alcohol use: Yes     Comment: A GLASS OF WINE ONCE A MONTH    • Drug use: Never   • Sexual activity: Defer       Family History   Problem Relation Age of Onset   • Arthritis Mother    • Hypertension Mother    • Arthritis Father    • Cancer Father    • Heart disease Father    • Heart attack Father    • Heart disease  Sister        Review of Systems   Constitutional: Positive for appetite change, fatigue and fever. Negative for activity change, chills, diaphoresis and unexpected weight change.   Respiratory: Negative.    Cardiovascular: Negative.    Gastrointestinal: Positive for constipation. Negative for abdominal distention, abdominal pain, anal bleeding, blood in stool, diarrhea, nausea and rectal pain.   Musculoskeletal: Negative.    Skin: Negative.    Hematological: Negative.        Vitals:    11/10/20 1316   Temp: 98.3 °F (36.8 °C)       Physical Exam  Vitals signs reviewed.   Constitutional:       Appearance: He is well-developed.   HENT:      Head: Normocephalic and atraumatic.   Eyes:      General: No scleral icterus.     Pupils: Pupils are equal, round, and reactive to light.   Cardiovascular:      Rate and Rhythm: Normal rate and regular rhythm.      Heart sounds: Normal heart sounds.   Pulmonary:      Effort: Pulmonary effort is normal. No respiratory distress.      Breath sounds: Normal breath sounds.   Abdominal:      General: Bowel sounds are normal. There is no distension.      Palpations: Abdomen is soft. There is no mass.      Tenderness: There is no abdominal tenderness.      Hernia: No hernia is present.   Skin:     General: Skin is warm and dry.      Coloration: Skin is not jaundiced.   Neurological:      General: No focal deficit present.      Mental Status: He is alert and oriented to person, place, and time.      Cranial Nerves: No cranial nerve deficit.   Psychiatric:         Behavior: Behavior normal.         Thought Content: Thought content normal.         Judgment: Judgment normal.         Admission on 10/18/2020, Discharged on 10/18/2020   Component Date Value Ref Range Status   • Glucose 10/18/2020 121* 65 - 99 mg/dL Final   • BUN 10/18/2020 20  8 - 23 mg/dL Final   • Creatinine 10/18/2020 0.66* 0.76 - 1.27 mg/dL Final   • Sodium 10/18/2020 140  136 - 145 mmol/L Final   • Potassium 10/18/2020 3.9   3.5 - 5.2 mmol/L Final   • Chloride 10/18/2020 104  98 - 107 mmol/L Final   • CO2 10/18/2020 28.2  22.0 - 29.0 mmol/L Final   • Calcium 10/18/2020 9.3  8.6 - 10.5 mg/dL Final   • Total Protein 10/18/2020 6.4  6.0 - 8.5 g/dL Final   • Albumin 10/18/2020 3.40* 3.50 - 5.20 g/dL Final   • ALT (SGPT) 10/18/2020 12  1 - 41 U/L Final   • AST (SGOT) 10/18/2020 12  1 - 40 U/L Final   • Alkaline Phosphatase 10/18/2020 106  39 - 117 U/L Final   • Total Bilirubin 10/18/2020 0.8  0.0 - 1.2 mg/dL Final   • eGFR Non  Amer 10/18/2020 116  >60 mL/min/1.73 Final   • Globulin 10/18/2020 3.0  gm/dL Final   • A/G Ratio 10/18/2020 1.1  g/dL Final   • BUN/Creatinine Ratio 10/18/2020 30.3* 7.0 - 25.0 Final   • Anion Gap 10/18/2020 7.8  5.0 - 15.0 mmol/L Final   • proBNP 10/18/2020 177.9  0.0-1,800.0 pg/mL Final   • Troponin T 10/18/2020 <0.010  0.000 - 0.030 ng/mL Final   • WBC 10/18/2020 5.02  3.40 - 10.80 10*3/mm3 Final   • RBC 10/18/2020 3.68* 4.14 - 5.80 10*6/mm3 Final   • Hemoglobin 10/18/2020 11.1* 13.0 - 17.7 g/dL Final   • Hematocrit 10/18/2020 33.4* 37.5 - 51.0 % Final   • MCV 10/18/2020 90.8  79.0 - 97.0 fL Final   • MCH 10/18/2020 30.2  26.6 - 33.0 pg Final   • MCHC 10/18/2020 33.2  31.5 - 35.7 g/dL Final   • RDW 10/18/2020 14.5  12.3 - 15.4 % Final   • RDW-SD 10/18/2020 47.9  37.0 - 54.0 fl Final   • MPV 10/18/2020 8.7  6.0 - 12.0 fL Final   • Platelets 10/18/2020 164  140 - 450 10*3/mm3 Final   • Neutrophil % 10/18/2020 55.3  42.7 - 76.0 % Final   • Lymphocyte % 10/18/2020 31.3  19.6 - 45.3 % Final   • Monocyte % 10/18/2020 9.4  5.0 - 12.0 % Final   • Eosinophil % 10/18/2020 3.2  0.3 - 6.2 % Final   • Basophil % 10/18/2020 0.4  0.0 - 1.5 % Final   • Immature Grans % 10/18/2020 0.4  0.0 - 0.5 % Final   • Neutrophils, Absolute 10/18/2020 2.78  1.70 - 7.00 10*3/mm3 Final   • Lymphocytes, Absolute 10/18/2020 1.57  0.70 - 3.10 10*3/mm3 Final   • Monocytes, Absolute 10/18/2020 0.47  0.10 - 0.90 10*3/mm3 Final   • Eosinophils,  Absolute 10/18/2020 0.16  0.00 - 0.40 10*3/mm3 Final   • Basophils, Absolute 10/18/2020 0.02  0.00 - 0.20 10*3/mm3 Final   • Immature Grans, Absolute 10/18/2020 0.02  0.00 - 0.05 10*3/mm3 Final   • nRBC 10/18/2020 0.2  0.0 - 0.2 /100 WBC Final   • SARS-CoV-2 PCR 10/18/2020 Not Detected  Not Detected Final       Diagnoses and all orders for this visit:    1. Bile duct obstruction (Primary)  -     CBC (No Diff)  -     Comprehensive Metabolic Panel  -     Thyroid Panel With TSH    2. Constipation, unspecified constipation type  -     CBC (No Diff)  -     Comprehensive Metabolic Panel  -     Thyroid Panel With TSH    Other orders  -     linaclotide (Linzess) 145 MCG capsule capsule; Take 1 capsule by mouth Every Morning Before Breakfast.  Dispense: 20 capsule; Refill: 0      Patient 80-year-old male with history of ampullary stenosis status post biliary stenting had been doing well until about 3 weeks ago noted weakness with body aches and low-grade fevers.  Patient seen in ER with normal work-up and discharged home.  Since that time patient has been complaining of progressive constipation with anorexia and fatigue.  Patient still having body aches weakness with hard stools.  At this point would recommend trial of Linzess to help his bowel movements and we will recheck his labs to confirm liver function is adequate and no sign of stent obstruct recurrent stent obstruction.

## 2020-11-11 LAB
ALBUMIN SERPL-MCNC: 3.3 G/DL (ref 3.5–5.2)
ALBUMIN/GLOB SERPL: 1.3 G/DL
ALP SERPL-CCNC: 102 U/L (ref 39–117)
ALT SERPL-CCNC: 13 U/L (ref 1–41)
AST SERPL-CCNC: 11 U/L (ref 1–40)
BILIRUB SERPL-MCNC: 0.5 MG/DL (ref 0–1.2)
BUN SERPL-MCNC: 19 MG/DL (ref 8–23)
BUN/CREAT SERPL: 28.4 (ref 7–25)
CALCIUM SERPL-MCNC: 8.6 MG/DL (ref 8.6–10.5)
CHLORIDE SERPL-SCNC: 101 MMOL/L (ref 98–107)
CO2 SERPL-SCNC: 27.1 MMOL/L (ref 22–29)
CREAT SERPL-MCNC: 0.67 MG/DL (ref 0.76–1.27)
ERYTHROCYTE [DISTWIDTH] IN BLOOD BY AUTOMATED COUNT: 15 % (ref 12.3–15.4)
FT4I SERPL CALC-MCNC: 4.1 (ref 1.2–4.9)
GLOBULIN SER CALC-MCNC: 2.5 GM/DL
GLUCOSE SERPL-MCNC: 111 MG/DL (ref 65–99)
HCT VFR BLD AUTO: 33.7 % (ref 37.5–51)
HGB BLD-MCNC: 10.9 G/DL (ref 13–17.7)
MCH RBC QN AUTO: 30.1 PG (ref 26.6–33)
MCHC RBC AUTO-ENTMCNC: 32.3 G/DL (ref 31.5–35.7)
MCV RBC AUTO: 93.1 FL (ref 79–97)
PLATELET # BLD AUTO: 209 10*3/MM3 (ref 140–450)
POTASSIUM SERPL-SCNC: 4.6 MMOL/L (ref 3.5–5.2)
PROT SERPL-MCNC: 5.8 G/DL (ref 6–8.5)
RBC # BLD AUTO: 3.62 10*6/MM3 (ref 4.14–5.8)
SODIUM SERPL-SCNC: 137 MMOL/L (ref 136–145)
T3RU NFR SERPL: 36 % (ref 24–39)
T4 SERPL-MCNC: 11.4 UG/DL (ref 4.5–12)
TSH SERPL DL<=0.005 MIU/L-ACNC: 0.04 UIU/ML (ref 0.45–4.5)
WBC # BLD AUTO: 4.39 10*3/MM3 (ref 3.4–10.8)

## 2020-12-15 ENCOUNTER — TELEPHONE (OUTPATIENT)
Dept: GASTROENTEROLOGY | Facility: CLINIC | Age: 80
End: 2020-12-15

## 2020-12-15 NOTE — TELEPHONE ENCOUNTER
----- Message from Oscar Evangelista MD sent at 11/12/2020  1:03 PM EST -----  Thyroid labs slightly abnormal, need follow-up with the primary physician.  Liver enzymes are normal suggesting stent is not an issue at this time.  Continue medicine for his bowels and follow-up with primary concerning his thyroid levels

## 2021-05-13 ENCOUNTER — OFFICE VISIT (OUTPATIENT)
Dept: FAMILY MEDICINE CLINIC | Facility: CLINIC | Age: 81
End: 2021-05-13

## 2021-05-13 VITALS
OXYGEN SATURATION: 90 % | HEART RATE: 82 BPM | BODY MASS INDEX: 20.41 KG/M2 | WEIGHT: 142.6 LBS | DIASTOLIC BLOOD PRESSURE: 56 MMHG | HEIGHT: 70 IN | SYSTOLIC BLOOD PRESSURE: 92 MMHG | RESPIRATION RATE: 16 BRPM | TEMPERATURE: 97.1 F

## 2021-05-13 DIAGNOSIS — E78.2 MIXED HYPERLIPIDEMIA: ICD-10-CM

## 2021-05-13 DIAGNOSIS — Z86.73 HISTORY OF CVA (CEREBROVASCULAR ACCIDENT): ICD-10-CM

## 2021-05-13 DIAGNOSIS — E03.9 ACQUIRED HYPOTHYROIDISM: Primary | ICD-10-CM

## 2021-05-13 PROBLEM — C61 METASTATIC CASTRATION-RESISTANT ADENOCARCINOMA OF PROSTATE (HCC): Status: ACTIVE | Noted: 2020-12-16

## 2021-05-13 PROBLEM — Z79.630: Status: ACTIVE | Noted: 2020-03-09

## 2021-05-13 PROBLEM — Z19.2 METASTATIC CASTRATION-RESISTANT ADENOCARCINOMA OF PROSTATE (HCC): Status: ACTIVE | Noted: 2020-12-16

## 2021-05-13 PROBLEM — Z51.81: Status: ACTIVE | Noted: 2020-03-09

## 2021-05-13 PROCEDURE — 99214 OFFICE O/P EST MOD 30 MIN: CPT | Performed by: FAMILY MEDICINE

## 2021-05-13 RX ORDER — ABIRATERONE ACETATE 250 MG/1
750 TABLET ORAL DAILY
COMMUNITY
Start: 2021-03-15

## 2021-05-13 RX ORDER — LEVOTHYROXINE SODIUM 0.2 MG/1
200 TABLET ORAL DAILY
Qty: 90 TABLET | Refills: 3 | Status: SHIPPED | OUTPATIENT
Start: 2021-05-13 | End: 2022-05-13

## 2021-05-13 NOTE — PROGRESS NOTES
"Chief Complaint  Hypertension (med refill)    Subjective      History of Present Illness      Ismael Ball presents to [facility] refill medicines.  Patient feels well.  Labs will be due with regular lab draw in early August.  We will add TSH, free T4 and lipid panel to his currently scheduled lab draw.  He remains under the care of specialty for ongoing treatment for prostate cancer.    Review of Systems   Respiratory: Positive for shortness of breath (exertional).             Objective     Vital Signs:   BP 92/56   Pulse 82   Temp 97.1 °F (36.2 °C) (Tympanic)   Resp 16   Ht 177.8 cm (70\")   Wt 64.7 kg (142 lb 9.6 oz)   SpO2 90%   BMI 20.46 kg/m²       Physical Exam  Vitals reviewed.   Constitutional:       General: He is not in acute distress.  Eyes:      General: Lids are normal.      Conjunctiva/sclera: Conjunctivae normal.   Neck:      Vascular: No carotid bruit.      Trachea: No tracheal deviation.   Cardiovascular:      Rate and Rhythm: Normal rate and regular rhythm.      Heart sounds: Normal heart sounds. No murmur heard.     Pulmonary:      Effort: Pulmonary effort is normal.      Breath sounds: Normal breath sounds.   Skin:     General: Skin is warm and dry.   Neurological:      Mental Status: He is alert. He is not disoriented.   Psychiatric:         Speech: Speech normal.         Behavior: Behavior normal. Behavior is cooperative.              Result Review :     The data below has been reviewed by Pierre Olson MD on 05/13/2021.  Lab Results - Last 18 Months   Lab Units 05/12/21  1359 04/14/21  1017 03/17/21  1016 02/17/21  1342 01/11/21  1006 12/15/20  0413 11/25/20  1423 11/10/20  1349 10/18/20  0933   GLUCOSE mg/dL  --   --  109* 110* 106*  --   --  111*  --    BUN mg/dL  --   --  21* 25* 21*  --   --  19 20   CREATININE mg/dL  --   --  0.80 0.70 0.80  --   --  0.67* 0.66*   EGFR IF NONAFRICN AM mL/min/1.73  --   --   --   --   --   --   --  114 116   EGFR IF " AFRICN AM mL/min/1.73  --   --   --   --   --   --   --  138  --    SODIUM mmol/L  --   --  145 143 140  --   --  137 140   POTASSIUM mmol/L  --   --  4.1 4.3 4.6  --   --  4.6 3.9   CHLORIDE mmol/L  --   --  108* 106 103  --   --  101 104   CALCIUM mg/dL  --   --  9.2 9.2 8.9  --   --  8.6 9.3   ALBUMIN g/dL  --   --  3.5 3.6 3.6  --   --  3.30* 3.40*   BILIRUBIN mg/dL  --   --  1.4* 1.2 1.4*  --   --  0.5 0.8   ALK PHOS U/L  --   --  113 143* 185*  --   --  102 106   AST (SGOT) U/L  --   --  17 19 19  --   --  11 12   ALT (SGPT) U/L  --   --  12 14 14  --   --  13 12   HEMOGLOBIN A1C %  --   --   --   --   --  5.6  --   --   --    WBC 10*3/uL 5.63 4.09* 5.47  --  5.88 4.80   < > 4.39 5.02   RBC 10*6/uL 3.69* 3.59* 3.72*  --  4.22* 3.45*   < > 3.62* 3.68*   HEMATOCRIT % 35.6* 34.8* 35.7*  --  39.0* 31.3*   < > 33.7* 33.4*   MCV fL 96.5 96.9 96.0  --  92.4 90.7   < > 93.1 90.8   MCH pg 32.0 32.0 31.5  --  30.6 30.1   < > 30.1 30.2   TSH uIU/mL  --   --   --   --   --   --   --  0.037*  --     < > = values in this interval not displayed.                          Assessment/Plan     Assessment and Plan      Diagnoses and all orders for this visit:    1. Acquired hypothyroidism (Primary)  Assessment & Plan:  The current medical regimen is effective;  continue present plan and medications.      Orders:  -     levothyroxine (SYNTHROID, LEVOTHROID) 200 MCG tablet; Take 1 tablet by mouth Daily.  Dispense: 90 tablet; Refill: 3  -     TSH+Free T4; Future    2. Mixed hyperlipidemia  Assessment & Plan:  Lipid abnormalities are unchanged.  continue diet, check labs in August  Lipids will be reassessed in 6 months.    Orders:  -     Lipid Panel; Future    3. History of CVA (cerebrovascular accident)  Assessment & Plan:  The current medical regimen is effective;  continue present plan and medications.              Follow Up   Return in about 6 months (around 11/13/2021) for Medicare Wellness Visit, 30 min.    Patient was given  instructions and counseling regarding his condition or for health maintenance advice. Please see specific information pulled into the AVS if appropriate.

## 2021-05-13 NOTE — ASSESSMENT & PLAN NOTE
Lipid abnormalities are unchanged.  continue diet, check labs in August  Lipids will be reassessed in 6 months.

## 2021-05-16 ENCOUNTER — APPOINTMENT (OUTPATIENT)
Dept: CT IMAGING | Facility: HOSPITAL | Age: 81
End: 2021-05-16

## 2021-05-16 ENCOUNTER — HOSPITAL ENCOUNTER (EMERGENCY)
Facility: HOSPITAL | Age: 81
Discharge: HOME | End: 2021-05-16
Attending: EMERGENCY MEDICINE | Admitting: EMERGENCY MEDICINE

## 2021-05-16 ENCOUNTER — APPOINTMENT (OUTPATIENT)
Dept: GENERAL RADIOLOGY | Facility: HOSPITAL | Age: 81
End: 2021-05-16

## 2021-05-16 VITALS
TEMPERATURE: 97.7 F | DIASTOLIC BLOOD PRESSURE: 72 MMHG | HEART RATE: 64 BPM | OXYGEN SATURATION: 98 % | RESPIRATION RATE: 18 BRPM | WEIGHT: 144.8 LBS | HEIGHT: 70 IN | SYSTOLIC BLOOD PRESSURE: 125 MMHG | BODY MASS INDEX: 20.73 KG/M2

## 2021-05-16 DIAGNOSIS — Z19.2 METASTATIC CASTRATION-RESISTANT ADENOCARCINOMA OF PROSTATE (HCC): ICD-10-CM

## 2021-05-16 DIAGNOSIS — C61 METASTATIC CASTRATION-RESISTANT ADENOCARCINOMA OF PROSTATE (HCC): ICD-10-CM

## 2021-05-16 DIAGNOSIS — R10.11 RIGHT UPPER QUADRANT PAIN: Primary | ICD-10-CM

## 2021-05-16 LAB
ALBUMIN SERPL-MCNC: 3.7 G/DL (ref 3.5–5.2)
ALBUMIN/GLOB SERPL: 1.4 G/DL
ALP SERPL-CCNC: 124 U/L (ref 39–117)
ALT SERPL W P-5'-P-CCNC: 10 U/L (ref 1–41)
ANION GAP SERPL CALCULATED.3IONS-SCNC: 6.2 MMOL/L (ref 5–15)
AST SERPL-CCNC: 12 U/L (ref 1–40)
BASOPHILS # BLD AUTO: 0.04 10*3/MM3 (ref 0–0.2)
BASOPHILS NFR BLD AUTO: 0.8 % (ref 0–1.5)
BILIRUB SERPL-MCNC: 1 MG/DL (ref 0–1.2)
BILIRUB UR QL STRIP: NEGATIVE
BUN SERPL-MCNC: 15 MG/DL (ref 8–23)
BUN/CREAT SERPL: 19.7 (ref 7–25)
CALCIUM SPEC-SCNC: 9.2 MG/DL (ref 8.6–10.5)
CHLORIDE SERPL-SCNC: 104 MMOL/L (ref 98–107)
CLARITY UR: ABNORMAL
CO2 SERPL-SCNC: 29.8 MMOL/L (ref 22–29)
COLOR UR: YELLOW
CREAT SERPL-MCNC: 0.76 MG/DL (ref 0.76–1.27)
D DIMER PPP FEU-MCNC: 1.44 MCGFEU/ML (ref 0–0.49)
DEPRECATED RDW RBC AUTO: 59.5 FL (ref 37–54)
EOSINOPHIL # BLD AUTO: 0.2 10*3/MM3 (ref 0–0.4)
EOSINOPHIL NFR BLD AUTO: 4.1 % (ref 0.3–6.2)
ERYTHROCYTE [DISTWIDTH] IN BLOOD BY AUTOMATED COUNT: 17.6 % (ref 12.3–15.4)
GFR SERPL CREATININE-BSD FRML MDRD: 98 ML/MIN/1.73
GLOBULIN UR ELPH-MCNC: 2.7 GM/DL
GLUCOSE SERPL-MCNC: 94 MG/DL (ref 65–99)
GLUCOSE UR STRIP-MCNC: NEGATIVE MG/DL
HCT VFR BLD AUTO: 35.9 % (ref 37.5–51)
HGB BLD-MCNC: 12 G/DL (ref 13–17.7)
HGB UR QL STRIP.AUTO: NEGATIVE
IMM GRANULOCYTES # BLD AUTO: 0.04 10*3/MM3 (ref 0–0.05)
IMM GRANULOCYTES NFR BLD AUTO: 0.8 % (ref 0–0.5)
KETONES UR QL STRIP: NEGATIVE
LEUKOCYTE ESTERASE UR QL STRIP.AUTO: NEGATIVE
LIPASE SERPL-CCNC: 44 U/L (ref 13–60)
LYMPHOCYTES # BLD AUTO: 2.17 10*3/MM3 (ref 0.7–3.1)
LYMPHOCYTES NFR BLD AUTO: 44.7 % (ref 19.6–45.3)
MCH RBC QN AUTO: 31.7 PG (ref 26.6–33)
MCHC RBC AUTO-ENTMCNC: 33.4 G/DL (ref 31.5–35.7)
MCV RBC AUTO: 95 FL (ref 79–97)
MONOCYTES # BLD AUTO: 0.42 10*3/MM3 (ref 0.1–0.9)
MONOCYTES NFR BLD AUTO: 8.6 % (ref 5–12)
NEUTROPHILS NFR BLD AUTO: 1.99 10*3/MM3 (ref 1.7–7)
NEUTROPHILS NFR BLD AUTO: 41 % (ref 42.7–76)
NITRITE UR QL STRIP: NEGATIVE
NRBC BLD AUTO-RTO: 1.9 /100 WBC (ref 0–0.2)
PH UR STRIP.AUTO: 5.5 [PH] (ref 5–8)
PLATELET # BLD AUTO: 225 10*3/MM3 (ref 140–450)
PMV BLD AUTO: 9 FL (ref 6–12)
POTASSIUM SERPL-SCNC: 4 MMOL/L (ref 3.5–5.2)
PROT SERPL-MCNC: 6.4 G/DL (ref 6–8.5)
PROT UR QL STRIP: NEGATIVE
QT INTERVAL: 382 MS
RBC # BLD AUTO: 3.78 10*6/MM3 (ref 4.14–5.8)
SODIUM SERPL-SCNC: 140 MMOL/L (ref 136–145)
SP GR UR STRIP: 1.01 (ref 1–1.03)
TROPONIN T SERPL-MCNC: <0.01 NG/ML (ref 0–0.03)
UROBILINOGEN UR QL STRIP: ABNORMAL
WBC # BLD AUTO: 4.86 10*3/MM3 (ref 3.4–10.8)

## 2021-05-16 PROCEDURE — 93010 ELECTROCARDIOGRAM REPORT: CPT | Performed by: INTERNAL MEDICINE

## 2021-05-16 PROCEDURE — 85025 COMPLETE CBC W/AUTO DIFF WBC: CPT | Performed by: EMERGENCY MEDICINE

## 2021-05-16 PROCEDURE — 93005 ELECTROCARDIOGRAM TRACING: CPT | Performed by: EMERGENCY MEDICINE

## 2021-05-16 PROCEDURE — 71275 CT ANGIOGRAPHY CHEST: CPT

## 2021-05-16 PROCEDURE — 80053 COMPREHEN METABOLIC PANEL: CPT | Performed by: EMERGENCY MEDICINE

## 2021-05-16 PROCEDURE — 99283 EMERGENCY DEPT VISIT LOW MDM: CPT

## 2021-05-16 PROCEDURE — 84484 ASSAY OF TROPONIN QUANT: CPT | Performed by: EMERGENCY MEDICINE

## 2021-05-16 PROCEDURE — 0 IOPAMIDOL PER 1 ML: Performed by: EMERGENCY MEDICINE

## 2021-05-16 PROCEDURE — 81003 URINALYSIS AUTO W/O SCOPE: CPT | Performed by: EMERGENCY MEDICINE

## 2021-05-16 PROCEDURE — 71045 X-RAY EXAM CHEST 1 VIEW: CPT

## 2021-05-16 PROCEDURE — 85379 FIBRIN DEGRADATION QUANT: CPT | Performed by: EMERGENCY MEDICINE

## 2021-05-16 PROCEDURE — 83690 ASSAY OF LIPASE: CPT | Performed by: EMERGENCY MEDICINE

## 2021-05-16 RX ORDER — SODIUM CHLORIDE 0.9 % (FLUSH) 0.9 %
10 SYRINGE (ML) INJECTION AS NEEDED
Status: DISCONTINUED | OUTPATIENT
Start: 2021-05-16 | End: 2021-05-16 | Stop reason: HOSPADM

## 2021-05-16 RX ADMIN — IOPAMIDOL 95 ML: 755 INJECTION, SOLUTION INTRAVENOUS at 09:34

## 2021-05-16 NOTE — ED TRIAGE NOTES
"Pt to the ED from home with c/o right sided abdominal pain x 1 week. Pt states the pain is \"right underneath his rib cage\". Pt rates pain as 3/10 and describes it as \"pulseating\" intermittently \"when he takes a breath\". Pt placed in mask during triage. This RN wearing proper PPE, mask and glasses, during pt encounter. Hand hygiene performed before and after pt encounter.     "

## 2021-05-16 NOTE — DISCHARGE INSTRUCTIONS
Your ED testing did not show evidence of blood clots, heart attack or serious infection.  I would recommend usage of either Tylenol or ibuprofen to help with pain.  Please follow-up with your primary care provider if not improved in 5 to 7 days, return to the ED for increased pain, fever or as needed.

## 2021-05-16 NOTE — ED PROVIDER NOTES
EMERGENCY DEPARTMENT ENCOUNTER    Room Number:  33/33  Date of encounter:  5/16/2021  PCP: Pierre Olson MD  Historian: Patient     I used full protective equipment while examining this patient.  This includes face mask, gloves and protective eyewear.  I washed my hands before entering the room and immediately upon leaving the room      HPI:  Chief Complaint: Abdominal pain  A complete HPI/ROS/PMH/PSH/SH/FH are unobtainable due to: None    Context: Ismael Ball is a 81 y.o. male who presents to the ED c/o abdominal pain.  Patient reports pain in the right upper quadrant which is been present for about 1 week.  Pain is initially intermittent and lasting minutes at a time.  Pain is worsened with deep breath.  Since this morning around 5:00 pain has been fairly constant and again is worsened with deep breath.  Pain is described as an aching or stabbing and is worsened with deep breath.  Pain is mild at its worst.  Patient denies any nausea or vomiting.  He has had cough which is occasionally productive of clear sputum.  He denies any recent fever.  He has chronic shortness of breath that is not changed from baseline.      MEDICAL RECORD REVIEW  Prior medical records including office visit from 5/13.  Patient has a history of hypertension and hypothyroidism.  Also noted to have hyperlipidemia and history of stroke.    PAST MEDICAL HISTORY  Active Ambulatory Problems     Diagnosis Date Noted   • Acquired hypothyroidism 02/18/2016   • Mixed hyperlipidemia 02/18/2016   • History of CVA (cerebrovascular accident) 02/18/2016   • History of prostate cancer 02/18/2016   • Shortness of breath 02/22/2017   • Temporary cerebral vascular dysfunction 07/10/2013   • Retroperitoneal lymphadenopathy 05/15/2013   • Malignant neoplasm of prostate (CMS/HCC) 04/18/2017   • Prostate cancer metastatic to bone (CMS/HCC) 05/15/2013   • Elevated prostate specific antigen (PSA) 05/15/2013   • Elevated brain natriuretic peptide (BNP) level  04/20/2017   • Elevated liver function tests 04/24/2019   • Hyperbilirubinemia 05/04/2019   • Bile duct obstruction 09/20/2019   • Family history of malignant neoplasm of prostate 05/15/2013   • Encounter for monitoring cyclophosphamide therapy 03/09/2020   • 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 coli sepsis (CMS/HCC) 03/22/2017   • Biliary stent obstruction 04/24/2019   • Bacteremia due to Escherichia coli 04/25/2019   • Acute cholangitis 04/25/2019     Past Medical History:   Diagnosis Date   • Abdominal pain    • Anemia    • Aneurysm of ascending aorta (CMS/HCC)    • Arthritis    • Aspiration pneumonia of right lower lobe (CMS/HCC)    • Cancer (CMS/HCC)    • Chills    • Cholangitis    • Chronic adrenal insufficiency (CMS/HCC)    • CVA (cerebral vascular accident) (CMS/HCC)    • Diaphoresis    • Diarrhea    • Diverticulosis of colon    • Dizziness    • E coli infection 2017   • Elevated cholesterol    • Fatigue    • Fever    • Herpes labialis    • History of pneumonia    • History of transfusion    • Hyperlipidemia    • Hypertension    • Hypothyroidism    • Metastatic disease (CMS/HCC)    • PONV (postoperative nausea and vomiting)    • Prostate cancer (CMS/HCC)    • Septic shock (CMS/HCC)    • Vomiting    • Weakness          PAST SURGICAL HISTORY  Past Surgical History:   Procedure Laterality Date   • BILE DUCT STENT PLACEMENT     • BRONCHOSCOPY N/A 5/29/2019    Procedure: BRONCHOSCOPY;  Surgeon: Anjum Reza MD;  Location: [facility] ENDOSCOPY;  Service: Pulmonary   • CARDIAC CATHETERIZATION     • CATARACT EXTRACTION, BILATERAL Bilateral    • COLONOSCOPY  unknown    normal   • ERCP N/A 5/6/2019    Procedure: ENDOSCOPIC RETROGRADE CHOLANGIOPANCREATOGRAPHY WITH BALLOON SWEEP;  Surgeon: Tonja  Oscar CASTANON MD;  Location: [facility] ENDOSCOPY;  Service: Gastroenterology   • ERCP N/A 9/30/2019    Procedure: ENDOSCOPIC RETROGRADE CHOLANGIOPANCREATOGRAPHY WITH  BILIARY STENT REPLACEMENT (10MM X60MM);  Surgeon: Oscar Evangelista MD;  Location: [facility] ENDOSCOPY;  Service: Gastroenterology   • EYE SURGERY  12/05/2015    cataract   • HERNIA REPAIR     • FL ERCP DX COLLECTION SPECIMEN BRUSHING/WASHING N/A 3/24/2017    Procedure: ENDOSCOPIC RETROGRADE CHOLANGIOPANCREATOGRAPHY, STENT REMOVAL, STENT PLACEMENT, CHOLANGIOGRAM;  Surgeon: Oscar Evangelista MD;  Location: [facility] OR;  Service: Gastroenterology   • FL ERCP DX COLLECTION SPECIMEN BRUSHING/WASHING N/A 5/1/2017    Procedure: ENDOSCOPIC RETROGRADE CHOLANGIOPANCREATOGRAPHY WITH STENT REMOVAL, SPYGLASS BIOPSIES AND WALLFLEX BILIARY STENT PLACEMENT;  Surgeon: Oscar Evangelista MD;  Location: [facility] ENDOSCOPY;  Service: Gastroenterology   • PROSTATECTOMY     • TOTAL HIP ARTHROPLASTY Right          FAMILY HISTORY  Family History   Problem Relation Age of Onset   • Arthritis Mother    • Hypertension Mother    • Arthritis Father    • Cancer Father    • Heart disease Father    • Heart attack Father    • Heart disease Sister          SOCIAL HISTORY  Social History     Socioeconomic History   • Marital status:      Spouse name: Not on file   • Number of children: Not on file   • Years of education: Not on file   • Highest education level: Not on file   Tobacco Use   • Smoking status: Former Smoker     Types: Cigars   • Smokeless tobacco: Never Used   • Tobacco comment: cigar quit 40 yrs ago   Substance and Sexual Activity   • Alcohol use: Yes     Comment: A GLASS OF WINE ONCE A MONTH    • Drug use: Never   • Sexual activity: Defer         ALLERGIES  Hydromorphone, Penicillins, and Statins       REVIEW OF SYSTEMS  Review of Systems   Constitutional: Negative.  Negative for fever.   HENT: Negative.  Negative for sore throat.    Eyes: Negative.     Respiratory: Positive for cough and shortness of breath (Chronic, unchanged from baseline).    Cardiovascular: Negative.  Negative for chest pain.   Gastrointestinal: Positive for abdominal pain (Right upper quadrant pain as per HPI).   Genitourinary: Negative.  Negative for dysuria.   Musculoskeletal: Negative.  Negative for back pain.   Skin: Negative.  Negative for rash.   Neurological: Negative.  Negative for headaches.   All other systems reviewed and are negative.          PHYSICAL EXAM    I have reviewed the triage vital signs and nursing notes.    ED Triage Vitals [05/16/21 0748]   Temp Heart Rate Resp BP SpO2   97.7 °F (36.5 °C) 86 16 -- 96 %      Temp src Heart Rate Source Patient Position BP Location FiO2 (%)   -- -- -- -- --       Physical Exam  GENERAL: Alert male in no obvious distress.  Vitals reviewed and notable for initial blood pressure 175/93.  HENT: nares patent  EYES: no scleral icterus  CV: regular rhythm, regular rate-no murmur  RESPIRATORY: normal effort, clear to auscultation bilaterally  ABDOMEN: soft, mild tenderness palpation right upper quadrant without rebound or guarding  MUSCULOSKELETAL: no deformity-no significant swelling or tenderness to palpation  NEURO: Strength, sensation, and coordination are grossly intact.  Speech and mentation are unremarkable  SKIN: warm, dry      LAB RESULTS  Recent Results (from the past 24 hour(s))   Urinalysis With Microscopic If Indicated (No Culture) - Urine, Clean Catch    Collection Time: 05/16/21  8:10 AM    Specimen: Urine, Clean Catch   Result Value Ref Range    Color, UA Yellow Yellow, Straw    Appearance, UA Cloudy (A) Clear    pH, UA 5.5 5.0 - 8.0    Specific Gravity, UA 1.014 1.005 - 1.030    Glucose, UA Negative Negative    Ketones, UA Negative Negative    Bilirubin, UA Negative Negative    Blood, UA Negative Negative    Protein, UA Negative Negative    Leuk Esterase, UA Negative Negative    Nitrite, UA Negative Negative    Urobilinogen,  UA 0.2 E.U./dL 0.2 - 1.0 E.U./dL   Comprehensive Metabolic Panel    Collection Time: 05/16/21  8:12 AM    Specimen: Blood   Result Value Ref Range    Glucose 94 65 - 99 mg/dL    BUN 15 8 - 23 mg/dL    Creatinine 0.76 0.76 - 1.27 mg/dL    Sodium 140 136 - 145 mmol/L    Potassium 4.0 3.5 - 5.2 mmol/L    Chloride 104 98 - 107 mmol/L    CO2 29.8 (H) 22.0 - 29.0 mmol/L    Calcium 9.2 8.6 - 10.5 mg/dL    Total Protein 6.4 6.0 - 8.5 g/dL    Albumin 3.70 3.50 - 5.20 g/dL    ALT (SGPT) 10 1 - 41 U/L    AST (SGOT) 12 1 - 40 U/L    Alkaline Phosphatase 124 (H) 39 - 117 U/L    Total Bilirubin 1.0 0.0 - 1.2 mg/dL    eGFR Non African Amer 98 >60 mL/min/1.73    Globulin 2.7 gm/dL    A/G Ratio 1.4 g/dL    BUN/Creatinine Ratio 19.7 7.0 - 25.0    Anion Gap 6.2 5.0 - 15.0 mmol/L   Lipase    Collection Time: 05/16/21  8:12 AM    Specimen: Blood   Result Value Ref Range    Lipase 44 13 - 60 U/L   Troponin    Collection Time: 05/16/21  8:12 AM    Specimen: Blood   Result Value Ref Range    Troponin T <0.010 0.000 - 0.030 ng/mL   D-dimer, Quantitative    Collection Time: 05/16/21  8:12 AM    Specimen: Blood   Result Value Ref Range    D-Dimer, Quantitative 1.44 (H) 0.00 - 0.49 MCGFEU/mL   CBC Auto Differential    Collection Time: 05/16/21  8:12 AM    Specimen: Blood   Result Value Ref Range    WBC 4.86 3.40 - 10.80 10*3/mm3    RBC 3.78 (L) 4.14 - 5.80 10*6/mm3    Hemoglobin 12.0 (L) 13.0 - 17.7 g/dL    Hematocrit 35.9 (L) 37.5 - 51.0 %    MCV 95.0 79.0 - 97.0 fL    MCH 31.7 26.6 - 33.0 pg    MCHC 33.4 31.5 - 35.7 g/dL    RDW 17.6 (H) 12.3 - 15.4 %    RDW-SD 59.5 (H) 37.0 - 54.0 fl    MPV 9.0 6.0 - 12.0 fL    Platelets 225 140 - 450 10*3/mm3    Neutrophil % 41.0 (L) 42.7 - 76.0 %    Lymphocyte % 44.7 19.6 - 45.3 %    Monocyte % 8.6 5.0 - 12.0 %    Eosinophil % 4.1 0.3 - 6.2 %    Basophil % 0.8 0.0 - 1.5 %    Immature Grans % 0.8 (H) 0.0 - 0.5 %    Neutrophils, Absolute 1.99 1.70 - 7.00 10*3/mm3    Lymphocytes, Absolute 2.17 0.70 - 3.10  10*3/mm3    Monocytes, Absolute 0.42 0.10 - 0.90 10*3/mm3    Eosinophils, Absolute 0.20 0.00 - 0.40 10*3/mm3    Basophils, Absolute 0.04 0.00 - 0.20 10*3/mm3    Immature Grans, Absolute 0.04 0.00 - 0.05 10*3/mm3    nRBC 1.9 (H) 0.0 - 0.2 /100 WBC   ECG 12 Lead    Collection Time: 21  8:18 AM   Result Value Ref Range    QT Interval 382 ms       Ordered the above labs and independently reviewed the results.      RADIOLOGY  XR Chest 1 View    Result Date: 2021  ONE VIEW PORTABLE CHEST  HISTORY: Cough. Metastatic prostate cancer.  FINDINGS: The lungs are moderately expanded with some minimal vague atelectasis at the right base actually appearing quite similar to the study of 10/18/2020. The heart remains slightly enlarged. There is extensive osteosclerotic metastatic bone disease in this patient with a history of metastatic prostate cancer and the bone lesions show further progression since 10/18/2020.  This report was finalized on 2021 9:45 AM by Dr. Rudy Fairbanks M.D.      CT Angiogram Chest    Result Date: 2021  CT ANGIOGRAPHY CHEST WITH INTRAVENOUS CONTRAST AND 3-D RECONSTRUCTIONS  HISTORY: Pleuritic chest pain. Cough. Elevated D-dimer. Metastatic prostate cancer.  TECHNIQUE/FINDINGS: The CT scan was performed as an emergency procedure with CT angiography protocol using intravenous contrast and 3-D reconstructions and demonstrates the followin. The pulmonary arteries are well-opacified and there is no evidence of pulmonary embolus. The thoracic aorta shows mild generalized ectasia with the ascending aorta measuring 4.2 cm and the descending thoracic aorta generally measuring 3 cm. There is no evidence of dissection. 2. There is a very small right pleural effusion layering posteriorly associated with some chronic strand-like atelectasis or scarring at the right base and the parenchymal changes are similar to an abdominal CT scan dating back to 2019. The lungs are otherwise clear. 3.  There are several enlarged mediastinal lymph nodes measuring up to 1.6 cm in long axis dimension. These are nonspecific and likely reactive. There is no hilar or axillary adenopathy. There is no pericardial effusion. 3. The CT images through the upper liver demonstrate a biliary stent with some associated air extending into the intrahepatic bile ducts. The spleen and gallbladder are unremarkable. There is enlargement of the partially visualized adrenal glands that is unchanged from 05/04/2019 and consistent with benign etiology. 4. At bone windows, there is extensive osteosclerotic metastatic disease throughout the skeleton. There is no acute compression fracture in the thoracic spine.      Radiation dose reduction techniques were utilized, including automated exposure control and exposure modulation based on body size.         I ordered the above noted radiological studies. Reviewed by me and discussed with radiologist.  See dictation for official radiology interpretation.      PROCEDURES  Procedures      MEDICATIONS GIVEN IN ER    Medications   sodium chloride 0.9 % flush 10 mL (has no administration in time range)   iopamidol (ISOVUE-370) 76 % injection 100 mL (95 mL Intravenous Given 5/16/21 0934)         PROGRESS, DATA ANALYSIS, CONSULTS, AND MEDICAL DECISION MAKING    All labs have been independently reviewed by me.  All radiology studies have been reviewed by me and discussed with radiologist dictating the report.   EKG's independently viewed and interpreted by me.  Discussion below represents my analysis of pertinent findings related to patient's condition, differential diagnosis, treatment plan and final disposition.      ED Course as of May 16 1020   Sun May 16, 2021   0810 [id number] male presents with right upper quadrant abdominal pain ongoing for about 1 week.  Differential diagnosis is broad but would include pulmonary problems such as pneumonia or pulmonary embolism.  Would also consider gastro  intestinal problems such as colitis or cholecystitis and pancreatitis.  Would also include renal problem such as kidney stone or kidney infection.    [DB]   0827 EKG          EKG time: 0818  Rhythm/Rate: Sinus 68 with occasional PVCs  P waves and NM: Normal P waves and NM intervals  QRS, axis: Indeterminate axis unremarkable QRS  ST and T waves: Unremarkable ST and T wave    Interpreted Contemporaneously by me, independently viewed  Not significantly changed compared to prior 10/2020      [DB]   0841 Chest x-ray was independently reviewed and results verified with reading radiologist.  There is no active disease within the lungs but there is evidence of metastatic bony disease which is progressed from prior films.  This makes pneumonia much less likely and ruled out pneumothorax.  Still would be concerned about potential for pulmonary embolism.  Would also consider pain being related to bony metastasis.    [DB]   0849 History is reviewed and are fairly unremarkable save for a bicarbonate of 30.  LFTs and renal functions within normal limits.  Troponin and lipase also fall within normal limits which would go against pancreatitis and acute coronary syndrome.  At this point I am still waiting on D-dimer as I continue to have concerned about possible pulmonary embolism.    [DB]   0900 D-dimer is back and elevated at 1.44.  Will get CTA of the chest for further evaluation and to rule out pulmonary embolism.  Also will give better evaluation of noted bone lesion seen on CT scan.    [DB]   1006 I discussed CT scan of the chest with Dr. Rudy Fairbanks.  He reports no pulmonary embolism.  There is chronic changes in the right base of the lung which could potentially hide a pneumonia.  Visualized images of the gallbladder are fairly unremarkable.    At this point given benign labs, no fever I do not think that there is infection in the lung and would not start antibiotics at this point.  I do not see pulmonary embolism or  coronary artery disease.  I think that this may be musculoskeletal and certainly could be related to bony metastasis from prostate cancer.  We will treat pain symptomatically with Tylenol or ibuprofen.  Will ask patient to follow-up with primary care provider if not improved next 5 to 7 days, return to the ED for increased chest pain, shortness of breath or as needed.    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    Reviewed implications of results, diagnosis, meds, responsibility to follow up, warning signs and symptoms of possible worsening, potential complications and reasons to return to ER, including increased pain, fever or as needed.    Patient/Family voiced understanding of above instructions.    Discussed plan for discharge, as there is no emergent indication for admission. Patient referred to primary care provider for BP management due to today's BP. Pt/family is agreeable and understands need for follow up and repeat testing.  Pt is aware that discharge does not mean that nothing is wrong but it indicates no emergency is present that requires admission and they must continue care with follow-up as given below or physician of their choice.     FOLLOW-UP  Pierre Olson MD  [id number] [id number] [phone]    In 1 week  If Not Better         Medication List      Changed    aspirin 81 MG tablet  Take 1 tablet by mouth Daily for 30 days.  What changed: when to take this                   Brian Steen MD  05/16/21 1025

## (undated) DEVICE — SPHINCTEROTOME: Brand: HYDRATOME RX 44

## (undated) DEVICE — ERBE NESSY®PLATE 170 SPLIT; 168CM²; CABLE 3M: Brand: ERBE

## (undated) DEVICE — TUBING, SUCTION, 1/4" X 10', STRAIGHT: Brand: MEDLINE

## (undated) DEVICE — SINGLE-USE BIOPSY FORCEPS: Brand: SPYBITE

## (undated) DEVICE — DEV LK WIREGUIDE FUSN OLYMP SCP

## (undated) DEVICE — Device: Brand: DEFENDO AIR/WATER/SUCTION AND BIOPSY VALVE

## (undated) DEVICE — TRAP,MUCUS SPECIMEN, 80CC: Brand: MEDLINE

## (undated) DEVICE — SENSR O2 OXIMAX FNGR A/ 18IN NONSTR

## (undated) DEVICE — ADAPT SWVL FIBROPTIC BRONCH

## (undated) DEVICE — SINGLE USE BIOPSY VALVE MAJ-210: Brand: SINGLE USE BIOPSY VALVE (STERILE)

## (undated) DEVICE — CANNULA,ADULT,SOFT-TOUCH,7'TUBE,UC: Brand: PENDING

## (undated) DEVICE — ACCESS AND DELIVERY CATHETER: Brand: SPYSCOPE DS

## (undated) DEVICE — BITEBLOCK OMNI BLOC

## (undated) DEVICE — SINGLE USE SUCTION VALVE MAJ-209: Brand: SINGLE USE SUCTION VALVE (STERILE)

## (undated) DEVICE — Device

## (undated) DEVICE — CANN NASL CO2 TRULINK W/O2 A/

## (undated) DEVICE — SNAR POLYP SENSATION STDOVL 27 240 BX40

## (undated) DEVICE — RETRIEVAL BALLOON CATHETER: Brand: EXTRACTOR™ PRO RX

## (undated) DEVICE — LN SMPL O2 NASL/ORL SMART/CAPNOLINE PLS A/

## (undated) DEVICE — MSK AIRWY LARYNG LMA PILOT SZ4

## (undated) DEVICE — VITAL SIGNS™ JACKSON-REES CIRCUITS: Brand: VITAL SIGNS™

## (undated) DEVICE — CONMED DISPOSABLE BRONCHIAL CYTOLOGY BRUSH, STRAIGHT HANDLE, 3 MM X 120 CM: Brand: CONMED